# Patient Record
Sex: FEMALE | Race: BLACK OR AFRICAN AMERICAN | NOT HISPANIC OR LATINO
[De-identification: names, ages, dates, MRNs, and addresses within clinical notes are randomized per-mention and may not be internally consistent; named-entity substitution may affect disease eponyms.]

---

## 2017-04-19 PROBLEM — Z00.00 ENCOUNTER FOR PREVENTIVE HEALTH EXAMINATION: Status: ACTIVE | Noted: 2017-04-19

## 2017-05-23 ENCOUNTER — APPOINTMENT (OUTPATIENT)
Dept: ENDOCRINOLOGY | Facility: CLINIC | Age: 77
End: 2017-05-23

## 2017-05-23 ENCOUNTER — RESULT CHARGE (OUTPATIENT)
Age: 77
End: 2017-05-23

## 2017-05-23 ENCOUNTER — LABORATORY RESULT (OUTPATIENT)
Age: 77
End: 2017-05-23

## 2017-05-23 VITALS
HEIGHT: 66 IN | WEIGHT: 190 LBS | RESPIRATION RATE: 16 BRPM | BODY MASS INDEX: 30.53 KG/M2 | HEART RATE: 89 BPM | SYSTOLIC BLOOD PRESSURE: 130 MMHG | DIASTOLIC BLOOD PRESSURE: 80 MMHG | OXYGEN SATURATION: 98 %

## 2017-05-23 LAB — GLUCOSE BLDC GLUCOMTR-MCNC: 102

## 2017-05-25 LAB
ALBUMIN SERPL ELPH-MCNC: 4.2 G/DL
ALP BLD-CCNC: 94 U/L
ALT SERPL-CCNC: 16 U/L
ANION GAP SERPL CALC-SCNC: 16 MMOL/L
AST SERPL-CCNC: 17 U/L
BASOPHILS # BLD AUTO: 0.08 K/UL
BASOPHILS NFR BLD AUTO: 1 %
BILIRUB SERPL-MCNC: 0.5 MG/DL
BUN SERPL-MCNC: 23 MG/DL
CALCIUM SERPL-MCNC: 10.1 MG/DL
CHLORIDE SERPL-SCNC: 97 MMOL/L
CHOLEST SERPL-MCNC: 162 MG/DL
CHOLEST/HDLC SERPL: 3.7 RATIO
CO2 SERPL-SCNC: 23 MMOL/L
CREAT SERPL-MCNC: 0.99 MG/DL
EOSINOPHIL # BLD AUTO: 0.16 K/UL
EOSINOPHIL NFR BLD AUTO: 2 %
GLUCOSE SERPL-MCNC: 92 MG/DL
HBA1C MFR BLD HPLC: 8.3 %
HCT VFR BLD CALC: 44.1 %
HDLC SERPL-MCNC: 44 MG/DL
HGB BLD-MCNC: 13.8 G/DL
LDLC SERPL CALC-MCNC: 97 MG/DL
LYMPHOCYTES # BLD AUTO: 3.18 K/UL
LYMPHOCYTES NFR BLD AUTO: 40.5 %
MAN DIFF?: NORMAL
MCHC RBC-ENTMCNC: 23.2 PG
MCHC RBC-ENTMCNC: 31.3 GM/DL
MCV RBC AUTO: 74 FL
MONOCYTES # BLD AUTO: 0.94 K/UL
MONOCYTES NFR BLD AUTO: 11.9 %
NEUTROPHILS # BLD AUTO: 3.19 K/UL
NEUTROPHILS NFR BLD AUTO: 40.6 %
PLATELET # BLD AUTO: 187 K/UL
POTASSIUM SERPL-SCNC: 4.2 MMOL/L
PROT SERPL-MCNC: 7.4 G/DL
RBC # BLD: 5.96 M/UL
RBC # FLD: 16.2 %
SODIUM SERPL-SCNC: 136 MMOL/L
TRIGL SERPL-MCNC: 105 MG/DL
TSH SERPL-ACNC: 1.38 UIU/ML
WBC # FLD AUTO: 7.86 K/UL

## 2017-06-21 ENCOUNTER — OUTPATIENT (OUTPATIENT)
Dept: OUTPATIENT SERVICES | Facility: HOSPITAL | Age: 77
LOS: 1 days | Discharge: HOME | End: 2017-06-21

## 2017-06-21 VITALS
HEART RATE: 69 BPM | OXYGEN SATURATION: 98 % | TEMPERATURE: 98 F | DIASTOLIC BLOOD PRESSURE: 57 MMHG | HEIGHT: 66 IN | SYSTOLIC BLOOD PRESSURE: 140 MMHG | WEIGHT: 183.42 LBS | RESPIRATION RATE: 17 BRPM

## 2017-06-21 DIAGNOSIS — Z01.818 ENCOUNTER FOR OTHER PREPROCEDURAL EXAMINATION: ICD-10-CM

## 2017-06-21 DIAGNOSIS — M16.11 UNILATERAL PRIMARY OSTEOARTHRITIS, RIGHT HIP: ICD-10-CM

## 2017-06-21 DIAGNOSIS — N60.09 SOLITARY CYST OF UNSPECIFIED BREAST: Chronic | ICD-10-CM

## 2017-06-21 DIAGNOSIS — M25.551 PAIN IN RIGHT HIP: ICD-10-CM

## 2017-06-21 DIAGNOSIS — Z90.710 ACQUIRED ABSENCE OF BOTH CERVIX AND UTERUS: Chronic | ICD-10-CM

## 2017-06-21 DIAGNOSIS — I10 ESSENTIAL (PRIMARY) HYPERTENSION: ICD-10-CM

## 2017-06-21 DIAGNOSIS — E11.8 TYPE 2 DIABETES MELLITUS WITH UNSPECIFIED COMPLICATIONS: ICD-10-CM

## 2017-06-21 LAB
ALBUMIN SERPL ELPH-MCNC: 4.2 G/DL — SIGNIFICANT CHANGE UP (ref 3.3–5)
ALP SERPL-CCNC: 86 U/L — SIGNIFICANT CHANGE UP (ref 40–120)
ALT FLD-CCNC: 20 U/L — SIGNIFICANT CHANGE UP (ref 12–78)
ANION GAP SERPL CALC-SCNC: 10 MMOL/L — SIGNIFICANT CHANGE UP (ref 5–17)
APTT BLD: 30.5 SEC — SIGNIFICANT CHANGE UP (ref 27.5–37.4)
AST SERPL-CCNC: 20 U/L — SIGNIFICANT CHANGE UP (ref 15–37)
BASOPHILS # BLD AUTO: 0.1 K/UL — SIGNIFICANT CHANGE UP (ref 0–0.2)
BASOPHILS NFR BLD AUTO: 0.9 % — SIGNIFICANT CHANGE UP (ref 0–2)
BILIRUB SERPL-MCNC: 0.7 MG/DL — SIGNIFICANT CHANGE UP (ref 0.2–1.2)
BLD GP AB SCN SERPL QL: SIGNIFICANT CHANGE UP
BUN SERPL-MCNC: 25 MG/DL — HIGH (ref 7–23)
CALCIUM SERPL-MCNC: 10.2 MG/DL — HIGH (ref 8.5–10.1)
CHLORIDE SERPL-SCNC: 103 MMOL/L — SIGNIFICANT CHANGE UP (ref 96–108)
CO2 SERPL-SCNC: 28 MMOL/L — SIGNIFICANT CHANGE UP (ref 22–31)
CREAT SERPL-MCNC: 1.24 MG/DL — SIGNIFICANT CHANGE UP (ref 0.5–1.3)
EOSINOPHIL # BLD AUTO: 0.3 K/UL — SIGNIFICANT CHANGE UP (ref 0–0.5)
EOSINOPHIL NFR BLD AUTO: 3.1 % — SIGNIFICANT CHANGE UP (ref 0–6)
GLUCOSE SERPL-MCNC: 71 MG/DL — SIGNIFICANT CHANGE UP (ref 70–99)
HBA1C BLD-MCNC: 7.8 % — HIGH (ref 4–5.6)
HCT VFR BLD CALC: 46.6 % — HIGH (ref 34.5–45)
HGB BLD-MCNC: 15 G/DL — SIGNIFICANT CHANGE UP (ref 11.5–15.5)
INR BLD: 1.04 RATIO — SIGNIFICANT CHANGE UP (ref 0.88–1.16)
LYMPHOCYTES # BLD AUTO: 3.1 K/UL — SIGNIFICANT CHANGE UP (ref 1–3.3)
LYMPHOCYTES # BLD AUTO: 36.1 % — SIGNIFICANT CHANGE UP (ref 13–44)
MCHC RBC-ENTMCNC: 23.2 PG — LOW (ref 27–34)
MCHC RBC-ENTMCNC: 32.1 GM/DL — SIGNIFICANT CHANGE UP (ref 32–36)
MCV RBC AUTO: 72.4 FL — LOW (ref 80–100)
MONOCYTES # BLD AUTO: 0.7 K/UL — SIGNIFICANT CHANGE UP (ref 0–0.9)
MONOCYTES NFR BLD AUTO: 8.3 % — SIGNIFICANT CHANGE UP (ref 2–14)
MRSA PCR RESULT.: SIGNIFICANT CHANGE UP
NEUTROPHILS # BLD AUTO: 4.5 K/UL — SIGNIFICANT CHANGE UP (ref 1.8–7.4)
NEUTROPHILS NFR BLD AUTO: 51.7 % — SIGNIFICANT CHANGE UP (ref 43–77)
PLATELET # BLD AUTO: 252 K/UL — SIGNIFICANT CHANGE UP (ref 150–400)
POTASSIUM SERPL-MCNC: 3.5 MMOL/L — SIGNIFICANT CHANGE UP (ref 3.5–5.3)
POTASSIUM SERPL-SCNC: 3.5 MMOL/L — SIGNIFICANT CHANGE UP (ref 3.5–5.3)
PROT SERPL-MCNC: 8.4 GM/DL — HIGH (ref 6–8.3)
PROTHROM AB SERPL-ACNC: 11.3 SEC — SIGNIFICANT CHANGE UP (ref 9.8–12.7)
RBC # BLD: 6.44 M/UL — HIGH (ref 3.8–5.2)
RBC # FLD: 14.2 % — SIGNIFICANT CHANGE UP (ref 11–15)
S AUREUS DNA NOSE QL NAA+PROBE: SIGNIFICANT CHANGE UP
SODIUM SERPL-SCNC: 141 MMOL/L — SIGNIFICANT CHANGE UP (ref 135–145)
WBC # BLD: 8.7 K/UL — SIGNIFICANT CHANGE UP (ref 3.8–10.5)
WBC # FLD AUTO: 8.7 K/UL — SIGNIFICANT CHANGE UP (ref 3.8–10.5)

## 2017-06-21 NOTE — H&P PST ADULT - PMH
Essential hypertension    Obesity, unspecified obesity severity, unspecified obesity type    Type 2 diabetes mellitus with complication, without long-term current use of insulin

## 2017-06-21 NOTE — H&P PST ADULT - HISTORY OF PRESENT ILLNESS
76F presents to PST with right hip pain. Patient is here today for Pre-surgical clearance for elective right hip replacement. PMH includes NIDDM, HTN, obesity. Ambulates with cane.

## 2017-06-21 NOTE — H&P PST ADULT - PROBLEM SELECTOR PLAN 1
replacement scheduled for 7/6/17  cardiac clearance noted in chart, Dr Ashwini Pennington  medical clearance preop.  EKG/labs

## 2017-06-30 ENCOUNTER — APPOINTMENT (OUTPATIENT)
Dept: ENDOCRINOLOGY | Facility: CLINIC | Age: 77
End: 2017-06-30

## 2017-07-06 ENCOUNTER — INPATIENT (INPATIENT)
Facility: HOSPITAL | Age: 77
LOS: 1 days | Discharge: NURSING, REHAB OR LONG-TERM CARE | End: 2017-07-08
Attending: ORTHOPAEDIC SURGERY | Admitting: ORTHOPAEDIC SURGERY
Payer: MEDICARE

## 2017-07-06 ENCOUNTER — RESULT REVIEW (OUTPATIENT)
Age: 77
End: 2017-07-06

## 2017-07-06 VITALS
DIASTOLIC BLOOD PRESSURE: 77 MMHG | HEART RATE: 85 BPM | OXYGEN SATURATION: 100 % | TEMPERATURE: 97 F | RESPIRATION RATE: 16 BRPM | WEIGHT: 182.1 LBS | SYSTOLIC BLOOD PRESSURE: 150 MMHG | HEIGHT: 66 IN

## 2017-07-06 DIAGNOSIS — Z90.710 ACQUIRED ABSENCE OF BOTH CERVIX AND UTERUS: Chronic | ICD-10-CM

## 2017-07-06 DIAGNOSIS — N60.09 SOLITARY CYST OF UNSPECIFIED BREAST: Chronic | ICD-10-CM

## 2017-07-06 LAB
ANION GAP SERPL CALC-SCNC: 7 MMOL/L — SIGNIFICANT CHANGE UP (ref 5–17)
BLD GP AB SCN SERPL QL: SIGNIFICANT CHANGE UP
BUN SERPL-MCNC: 12 MG/DL — SIGNIFICANT CHANGE UP (ref 7–23)
CALCIUM SERPL-MCNC: 9 MG/DL — SIGNIFICANT CHANGE UP (ref 8.5–10.1)
CHLORIDE SERPL-SCNC: 105 MMOL/L — SIGNIFICANT CHANGE UP (ref 96–108)
CO2 SERPL-SCNC: 26 MMOL/L — SIGNIFICANT CHANGE UP (ref 22–31)
CREAT SERPL-MCNC: 0.93 MG/DL — SIGNIFICANT CHANGE UP (ref 0.5–1.3)
GLUCOSE SERPL-MCNC: 120 MG/DL — HIGH (ref 70–99)
HCT VFR BLD CALC: 41.3 % — SIGNIFICANT CHANGE UP (ref 34.5–45)
HGB BLD-MCNC: 13.4 G/DL — SIGNIFICANT CHANGE UP (ref 11.5–15.5)
MCHC RBC-ENTMCNC: 23.7 PG — LOW (ref 27–34)
MCHC RBC-ENTMCNC: 32.6 GM/DL — SIGNIFICANT CHANGE UP (ref 32–36)
MCV RBC AUTO: 72.8 FL — LOW (ref 80–100)
PLATELET # BLD AUTO: 234 K/UL — SIGNIFICANT CHANGE UP (ref 150–400)
POTASSIUM SERPL-MCNC: 4.2 MMOL/L — SIGNIFICANT CHANGE UP (ref 3.5–5.3)
POTASSIUM SERPL-SCNC: 4.2 MMOL/L — SIGNIFICANT CHANGE UP (ref 3.5–5.3)
RBC # BLD: 5.67 M/UL — HIGH (ref 3.8–5.2)
RBC # FLD: 14.7 % — SIGNIFICANT CHANGE UP (ref 11–15)
SODIUM SERPL-SCNC: 138 MMOL/L — SIGNIFICANT CHANGE UP (ref 135–145)
WBC # BLD: 9.2 K/UL — SIGNIFICANT CHANGE UP (ref 3.8–10.5)
WBC # FLD AUTO: 9.2 K/UL — SIGNIFICANT CHANGE UP (ref 3.8–10.5)

## 2017-07-06 PROCEDURE — 88305 TISSUE EXAM BY PATHOLOGIST: CPT | Mod: 26

## 2017-07-06 PROCEDURE — 88311 DECALCIFY TISSUE: CPT | Mod: 26

## 2017-07-06 PROCEDURE — 72170 X-RAY EXAM OF PELVIS: CPT | Mod: 26

## 2017-07-06 RX ORDER — CEFAZOLIN SODIUM 1 G
2000 VIAL (EA) INJECTION EVERY 8 HOURS
Qty: 0 | Refills: 0 | Status: COMPLETED | OUTPATIENT
Start: 2017-07-06 | End: 2017-07-07

## 2017-07-06 RX ORDER — PANTOPRAZOLE SODIUM 20 MG/1
40 TABLET, DELAYED RELEASE ORAL DAILY
Qty: 0 | Refills: 0 | Status: DISCONTINUED | OUTPATIENT
Start: 2017-07-06 | End: 2017-07-08

## 2017-07-06 RX ORDER — SENNA PLUS 8.6 MG/1
2 TABLET ORAL AT BEDTIME
Qty: 0 | Refills: 0 | Status: DISCONTINUED | OUTPATIENT
Start: 2017-07-06 | End: 2017-07-08

## 2017-07-06 RX ORDER — DEXTROSE 50 % IN WATER 50 %
12.5 SYRINGE (ML) INTRAVENOUS ONCE
Qty: 0 | Refills: 0 | Status: DISCONTINUED | OUTPATIENT
Start: 2017-07-06 | End: 2017-07-08

## 2017-07-06 RX ORDER — SODIUM CHLORIDE 9 MG/ML
3 INJECTION INTRAMUSCULAR; INTRAVENOUS; SUBCUTANEOUS EVERY 8 HOURS
Qty: 0 | Refills: 0 | Status: DISCONTINUED | OUTPATIENT
Start: 2017-07-06 | End: 2017-07-08

## 2017-07-06 RX ORDER — ATORVASTATIN CALCIUM 80 MG/1
10 TABLET, FILM COATED ORAL AT BEDTIME
Qty: 0 | Refills: 0 | Status: DISCONTINUED | OUTPATIENT
Start: 2017-07-06 | End: 2017-07-08

## 2017-07-06 RX ORDER — FOLIC ACID 0.8 MG
1 TABLET ORAL DAILY
Qty: 0 | Refills: 0 | Status: DISCONTINUED | OUTPATIENT
Start: 2017-07-06 | End: 2017-07-08

## 2017-07-06 RX ORDER — FAMOTIDINE 10 MG/ML
1 INJECTION INTRAVENOUS
Qty: 30 | Refills: 0
Start: 2017-07-06 | End: 2017-08-05

## 2017-07-06 RX ORDER — METOPROLOL TARTRATE 50 MG
25 TABLET ORAL DAILY
Qty: 0 | Refills: 0 | Status: DISCONTINUED | OUTPATIENT
Start: 2017-07-06 | End: 2017-07-06

## 2017-07-06 RX ORDER — MORPHINE SULFATE 50 MG/1
4 CAPSULE, EXTENDED RELEASE ORAL EVERY 6 HOURS
Qty: 0 | Refills: 0 | Status: DISCONTINUED | OUTPATIENT
Start: 2017-07-06 | End: 2017-07-08

## 2017-07-06 RX ORDER — HYDROMORPHONE HYDROCHLORIDE 2 MG/ML
0.5 INJECTION INTRAMUSCULAR; INTRAVENOUS; SUBCUTANEOUS
Qty: 0 | Refills: 0 | Status: DISCONTINUED | OUTPATIENT
Start: 2017-07-06 | End: 2017-07-08

## 2017-07-06 RX ORDER — DEXTROSE 50 % IN WATER 50 %
25 SYRINGE (ML) INTRAVENOUS ONCE
Qty: 0 | Refills: 0 | Status: DISCONTINUED | OUTPATIENT
Start: 2017-07-06 | End: 2017-07-08

## 2017-07-06 RX ORDER — OXYCODONE HYDROCHLORIDE 5 MG/1
5 TABLET ORAL EVERY 4 HOURS
Qty: 0 | Refills: 0 | Status: DISCONTINUED | OUTPATIENT
Start: 2017-07-06 | End: 2017-07-08

## 2017-07-06 RX ORDER — FERROUS SULFATE 325(65) MG
325 TABLET ORAL
Qty: 0 | Refills: 0 | Status: DISCONTINUED | OUTPATIENT
Start: 2017-07-06 | End: 2017-07-08

## 2017-07-06 RX ORDER — ONDANSETRON 8 MG/1
4 TABLET, FILM COATED ORAL EVERY 6 HOURS
Qty: 0 | Refills: 0 | Status: DISCONTINUED | OUTPATIENT
Start: 2017-07-06 | End: 2017-07-08

## 2017-07-06 RX ORDER — OXYCODONE HYDROCHLORIDE 5 MG/1
10 TABLET ORAL EVERY 4 HOURS
Qty: 0 | Refills: 0 | Status: DISCONTINUED | OUTPATIENT
Start: 2017-07-06 | End: 2017-07-08

## 2017-07-06 RX ORDER — LOSARTAN POTASSIUM 100 MG/1
25 TABLET, FILM COATED ORAL DAILY
Qty: 0 | Refills: 0 | Status: DISCONTINUED | OUTPATIENT
Start: 2017-07-06 | End: 2017-07-08

## 2017-07-06 RX ORDER — BENZOCAINE AND MENTHOL 5; 1 G/100ML; G/100ML
1 LIQUID ORAL EVERY 4 HOURS
Qty: 0 | Refills: 0 | Status: DISCONTINUED | OUTPATIENT
Start: 2017-07-06 | End: 2017-07-08

## 2017-07-06 RX ORDER — DOCUSATE SODIUM 100 MG
100 CAPSULE ORAL THREE TIMES A DAY
Qty: 0 | Refills: 0 | Status: DISCONTINUED | OUTPATIENT
Start: 2017-07-06 | End: 2017-07-08

## 2017-07-06 RX ORDER — DIPHENHYDRAMINE HCL 50 MG
25 CAPSULE ORAL EVERY 4 HOURS
Qty: 0 | Refills: 0 | Status: DISCONTINUED | OUTPATIENT
Start: 2017-07-06 | End: 2017-07-08

## 2017-07-06 RX ORDER — ACETAMINOPHEN 500 MG
650 TABLET ORAL EVERY 6 HOURS
Qty: 0 | Refills: 0 | Status: DISCONTINUED | OUTPATIENT
Start: 2017-07-06 | End: 2017-07-08

## 2017-07-06 RX ORDER — ASCORBIC ACID 60 MG
500 TABLET,CHEWABLE ORAL
Qty: 0 | Refills: 0 | Status: DISCONTINUED | OUTPATIENT
Start: 2017-07-06 | End: 2017-07-08

## 2017-07-06 RX ORDER — INSULIN LISPRO 100/ML
VIAL (ML) SUBCUTANEOUS
Qty: 0 | Refills: 0 | Status: DISCONTINUED | OUTPATIENT
Start: 2017-07-06 | End: 2017-07-08

## 2017-07-06 RX ORDER — SODIUM CHLORIDE 9 MG/ML
1000 INJECTION, SOLUTION INTRAVENOUS
Qty: 0 | Refills: 0 | Status: DISCONTINUED | OUTPATIENT
Start: 2017-07-06 | End: 2017-07-08

## 2017-07-06 RX ORDER — SODIUM CHLORIDE 9 MG/ML
1000 INJECTION, SOLUTION INTRAVENOUS
Qty: 0 | Refills: 0 | Status: DISCONTINUED | OUTPATIENT
Start: 2017-07-06 | End: 2017-07-07

## 2017-07-06 RX ORDER — DEXTROSE 50 % IN WATER 50 %
1 SYRINGE (ML) INTRAVENOUS ONCE
Qty: 0 | Refills: 0 | Status: DISCONTINUED | OUTPATIENT
Start: 2017-07-06 | End: 2017-07-08

## 2017-07-06 RX ORDER — DICLOFENAC SODIUM 75 MG/1
1 TABLET, DELAYED RELEASE ORAL
Qty: 0 | Refills: 0 | COMMUNITY

## 2017-07-06 RX ORDER — MELOXICAM 15 MG/1
1 TABLET ORAL
Qty: 0 | Refills: 0 | COMMUNITY

## 2017-07-06 RX ORDER — SODIUM CHLORIDE 9 MG/ML
3 INJECTION INTRAMUSCULAR; INTRAVENOUS; SUBCUTANEOUS EVERY 8 HOURS
Qty: 0 | Refills: 0 | Status: DISCONTINUED | OUTPATIENT
Start: 2017-07-06 | End: 2017-07-06

## 2017-07-06 RX ORDER — POLYETHYLENE GLYCOL 3350 17 G/17G
17 POWDER, FOR SOLUTION ORAL DAILY
Qty: 0 | Refills: 0 | Status: DISCONTINUED | OUTPATIENT
Start: 2017-07-06 | End: 2017-07-08

## 2017-07-06 RX ORDER — DIPHENHYDRAMINE HCL 50 MG
25 CAPSULE ORAL AT BEDTIME
Qty: 0 | Refills: 0 | Status: DISCONTINUED | OUTPATIENT
Start: 2017-07-06 | End: 2017-07-08

## 2017-07-06 RX ORDER — ASPIRIN/CALCIUM CARB/MAGNESIUM 324 MG
1 TABLET ORAL
Qty: 60 | Refills: 0
Start: 2017-07-06 | End: 2017-08-05

## 2017-07-06 RX ORDER — METOPROLOL TARTRATE 50 MG
25 TABLET ORAL DAILY
Qty: 0 | Refills: 0 | Status: DISCONTINUED | OUTPATIENT
Start: 2017-07-06 | End: 2017-07-08

## 2017-07-06 RX ORDER — GLUCAGON INJECTION, SOLUTION 0.5 MG/.1ML
1 INJECTION, SOLUTION SUBCUTANEOUS ONCE
Qty: 0 | Refills: 0 | Status: DISCONTINUED | OUTPATIENT
Start: 2017-07-06 | End: 2017-07-08

## 2017-07-06 RX ORDER — AMLODIPINE BESYLATE 2.5 MG/1
10 TABLET ORAL DAILY
Qty: 0 | Refills: 0 | Status: DISCONTINUED | OUTPATIENT
Start: 2017-07-06 | End: 2017-07-08

## 2017-07-06 RX ORDER — ASPIRIN/CALCIUM CARB/MAGNESIUM 324 MG
325 TABLET ORAL
Qty: 0 | Refills: 0 | Status: DISCONTINUED | OUTPATIENT
Start: 2017-07-07 | End: 2017-07-08

## 2017-07-06 RX ORDER — INSULIN LISPRO 100/ML
VIAL (ML) SUBCUTANEOUS AT BEDTIME
Qty: 0 | Refills: 0 | Status: DISCONTINUED | OUTPATIENT
Start: 2017-07-06 | End: 2017-07-08

## 2017-07-06 RX ADMIN — Medication 100 MILLIGRAM(S): at 20:54

## 2017-07-06 RX ADMIN — ATORVASTATIN CALCIUM 10 MILLIGRAM(S): 80 TABLET, FILM COATED ORAL at 22:43

## 2017-07-06 RX ADMIN — MORPHINE SULFATE 4 MILLIGRAM(S): 50 CAPSULE, EXTENDED RELEASE ORAL at 22:50

## 2017-07-06 RX ADMIN — Medication 100 MILLIGRAM(S): at 22:43

## 2017-07-06 RX ADMIN — SODIUM CHLORIDE 3 MILLILITER(S): 9 INJECTION INTRAMUSCULAR; INTRAVENOUS; SUBCUTANEOUS at 22:36

## 2017-07-06 RX ADMIN — MORPHINE SULFATE 4 MILLIGRAM(S): 50 CAPSULE, EXTENDED RELEASE ORAL at 23:05

## 2017-07-06 NOTE — DISCHARGE NOTE ADULT - MEDICATION SUMMARY - MEDICATIONS TO TAKE
I will START or STAY ON the medications listed below when I get home from the hospital:    Ecotrin 325 mg oral delayed release tablet  -- 1 tab(s) by mouth 2 times a day for DVT ppx  -- Swallow whole.  Do not crush.  Take with food or milk.    -- Indication: For DVT ppx    losartan 25 mg oral tablet  -- 1 tab(s) by mouth once a day  -- Indication: For prior med    Invokana 300 mg oral tablet  -- 1 tab(s) by mouth once a day  -- Indication: For prior med    glimepiride 4 mg oral tablet  -- 1 tab(s) by mouth once a day  -- Indication: For prior med    amlodipine-atorvastatin 10 mg-10 mg oral tablet  -- 1 tab(s) by mouth once a day  -- Indication: For prior med    metoprolol  -- 25 milligram(s) by mouth once a day  -- Indication: For prior med    Pepcid 20 mg oral tablet  -- 1 tab(s) by mouth once a day for GI ppx  -- It is very important that you take or use this exactly as directed.  Do not skip doses or discontinue unless directed by your doctor.  Obtain medical advice before taking any non-prescription drugs as some may affect the action of this medication.    -- Indication: For GI ppx I will START or STAY ON the medications listed below when I get home from the hospital:    oxyCODONE 5 mg oral tablet  -- 1 tab(s) by mouth every 4 hours, As needed, Mild Pain  -- Indication: For prn mild/moderate pain    oxyCODONE 10 mg oral tablet  -- 1 tab(s) by mouth every 4 hours, As needed, Moderate Pain  -- Indication: For prn severe pain    Ecotrin 325 mg oral delayed release tablet  -- 1 tab(s) by mouth 2 times a day for DVT ppx  -- Swallow whole.  Do not crush.  Take with food or milk.    -- Indication: For DVT ppx    losartan 25 mg oral tablet  -- 1 tab(s) by mouth once a day  -- Indication: For prior med    Invokana 300 mg oral tablet  -- 1 tab(s) by mouth once a day  -- Indication: For prior med    glimepiride 4 mg oral tablet  -- 1 tab(s) by mouth once a day  -- Indication: For prior med    Zofran 2 mg/mL injectable solution  --  injectable   -- Indication: For prn nausea, can give oral in subsitution    amlodipine-atorvastatin 10 mg-10 mg oral tablet  -- 1 tab(s) by mouth once a day  -- Indication: For prior med    metoprolol  -- 25 milligram(s) by mouth once a day  -- Indication: For prior med    Pepcid 20 mg oral tablet  -- 1 tab(s) by mouth once a day for GI ppx  -- It is very important that you take or use this exactly as directed.  Do not skip doses or discontinue unless directed by your doctor.  Obtain medical advice before taking any non-prescription drugs as some may affect the action of this medication.    -- Indication: For GI ppx    docusate sodium 100 mg oral capsule  -- 1 cap(s) by mouth 3 times a day  -- Indication: For prn constipation while taking pain medication    senna oral tablet  -- 2 tab(s) by mouth once a day (at bedtime), As needed, Constipation  -- Indication: For prn constipation while taking pain medication

## 2017-07-06 NOTE — DISCHARGE NOTE ADULT - HOSPITAL COURSE
The patient is a 76 year old female status post elective Total Hip Arthroplasty to the right hip after failing outpatient non-operative conservative management.  Patient presented to [facility] after being medically cleared for an elective surgical procedure. The patient was taken to the operating room on date mentioned above. Prophylactic antibiotics were started before the procedure and continued for 24 hours.  There were no complications during the procedure and patient tolerated the procedure well.  The patient was transferred to recovery room in stable condition and subsequently to surgical floor.  Patient was placed ASA for anticoagulation.  All home medications were continued.  The patient received physical therapy daily and daily labs were followed.  The dressing was kept clean, dry, intact.  The rest of the hospital stay was unremarkable.

## 2017-07-06 NOTE — PHYSICAL THERAPY INITIAL EVALUATION ADULT - LIVES WITH, PROFILE
Pt. lives on the 3rd floor apartment [facility], has 27steps to go to her floor, has railing on one side.

## 2017-07-06 NOTE — PHYSICAL THERAPY INITIAL EVALUATION ADULT - IMPAIRMENTS FOUND, PT EVAL
gait, locomotion, and balance/aerobic capacity/endurance/ROM/ergonomics and body mechanics/muscle strength

## 2017-07-06 NOTE — DISCHARGE NOTE ADULT - CARE PLAN
Principal Discharge DX:	OA (osteoarthritis)  Goal:	Return to ADL's  Instructions for follow-up, activity and diet:	1.	Pain Control  2.	Walking with full weight bearing as tolerated, with assistive devices (walker/Cane as Needed)  3.	DVT Prophylaxis with Aspirin 325mg twice daily for 4 weeks  4.	PT as needed  5.	Follow up with Dr. Sheppard as Outpatient in 10-14 Days after Discharge from the Hospital or Rehab. Call Office For Appointment  6.	Remove Staples Post-Op Day 14, and Remove Dressing Post-Op Day 10, with Daily Dressing Changes as Need.  7.	Ice/Elevate affected area as Needed  8.	Keep Dressing  Clean and dry.

## 2017-07-06 NOTE — DISCHARGE NOTE ADULT - PATIENT PORTAL LINK FT
“You can access the Follow[facility] Patient Portal, offered by [facility] [place], by registering with the following website: [email or web address]”

## 2017-07-06 NOTE — PROGRESS NOTE ADULT - SUBJECTIVE AND OBJECTIVE BOX
Post op check    Pt S&E. Pain controlled. Resting comfortably.     Vital Signs Last 24 Hrs  T(C): 36.2 (07-06-17 @ 08:28), Max: 36.2 (07-06-17 @ 08:28)  T(F): 97.1 (07-06-17 @ 08:28), Max: 97.1 (07-06-17 @ 08:28)  HR: 85 (07-06-17 @ 08:28) (85 - 85)  BP: 150/77 (07-06-17 @ 08:28) (150/77 - 150/77)  BP(mean): --  RR: 16 (07-06-17 @ 08:28) (16 - 16)  SpO2: 100% (07-06-17 @ 08:28) (100% - 100%)    Gen: NAD  RLE:  Dsg c/d/i  SILT L2-S1  +EHL/FHL/TA/Gastroc  DP+  Soft compartments, - calf ttp

## 2017-07-06 NOTE — PHYSICAL THERAPY INITIAL EVALUATION ADULT - COORDINATION ASSESSED, REHAB EVAL
not tested due to pain and tightness in the right hip/heel to shin heel to shin/not tested due to pain and tightness in the right hip

## 2017-07-06 NOTE — DISCHARGE NOTE ADULT - NS AS ACTIVITY OBS
Walking-Indoors allowed/Showering allowed/Stairs allowed/Walking-Outdoors allowed/No Heavy lifting/straining/Do not drive or operate machinery

## 2017-07-06 NOTE — DISCHARGE NOTE ADULT - PLAN OF CARE
Return to ADL's 1.	Pain Control  2.	Walking with full weight bearing as tolerated, with assistive devices (walker/Cane as Needed)  3.	DVT Prophylaxis with Aspirin 325mg twice daily for 4 weeks  4.	PT as needed  5.	Follow up with Dr. Sheppard as Outpatient in 10-14 Days after Discharge from the Hospital or Rehab. Call Office For Appointment  6.	Remove Staples Post-Op Day 14, and Remove Dressing Post-Op Day 10, with Daily Dressing Changes as Need.  7.	Ice/Elevate affected area as Needed  8.	Keep Dressing  Clean and dry.

## 2017-07-06 NOTE — PHYSICAL THERAPY INITIAL EVALUATION ADULT - DISCHARGE DISPOSITION, PT EVAL
home w/ home PT/May be changed pending ability to perform 27 steps in home setting safely and efficiently

## 2017-07-06 NOTE — PHYSICAL THERAPY INITIAL EVALUATION ADULT - CRITERIA FOR SKILLED THERAPEUTIC INTERVENTIONS
functional limitations in following categories/anticipated equipment needs at discharge/impairments found/anticipated discharge recommendation

## 2017-07-06 NOTE — DISCHARGE NOTE ADULT - CARE PROVIDER_API CALL
David Sheppard), Orthopaedic Surgery  [address] [place] [address]  Phone: [phone]  Fax: [phone]

## 2017-07-06 NOTE — PHYSICAL THERAPY INITIAL EVALUATION ADULT - GENERAL OBSERVATIONS, REHAB EVAL
Found supine, alert, oriented x 4, follow directions, dressing dry and intact, 02 cannula at 2 LPM, abduction pillow , pteds. c/o pain and tightness in the right hip.

## 2017-07-07 ENCOUNTER — TRANSCRIPTION ENCOUNTER (OUTPATIENT)
Age: 77
End: 2017-07-07

## 2017-07-07 LAB
ANION GAP SERPL CALC-SCNC: 8 MMOL/L — SIGNIFICANT CHANGE UP (ref 5–17)
BUN SERPL-MCNC: 17 MG/DL — SIGNIFICANT CHANGE UP (ref 7–23)
CALCIUM SERPL-MCNC: 8.5 MG/DL — SIGNIFICANT CHANGE UP (ref 8.5–10.1)
CHLORIDE SERPL-SCNC: 105 MMOL/L — SIGNIFICANT CHANGE UP (ref 96–108)
CO2 SERPL-SCNC: 26 MMOL/L — SIGNIFICANT CHANGE UP (ref 22–31)
CREAT SERPL-MCNC: 0.99 MG/DL — SIGNIFICANT CHANGE UP (ref 0.5–1.3)
GLUCOSE SERPL-MCNC: 156 MG/DL — HIGH (ref 70–99)
HCT VFR BLD CALC: 33.6 % — LOW (ref 34.5–45)
HGB BLD-MCNC: 10.9 G/DL — LOW (ref 11.5–15.5)
MCHC RBC-ENTMCNC: 23.3 PG — LOW (ref 27–34)
MCHC RBC-ENTMCNC: 32.5 GM/DL — SIGNIFICANT CHANGE UP (ref 32–36)
MCV RBC AUTO: 71.6 FL — LOW (ref 80–100)
PLATELET # BLD AUTO: 201 K/UL — SIGNIFICANT CHANGE UP (ref 150–400)
POTASSIUM SERPL-MCNC: 4.1 MMOL/L — SIGNIFICANT CHANGE UP (ref 3.5–5.3)
POTASSIUM SERPL-SCNC: 4.1 MMOL/L — SIGNIFICANT CHANGE UP (ref 3.5–5.3)
RBC # BLD: 4.68 M/UL — SIGNIFICANT CHANGE UP (ref 3.8–5.2)
RBC # FLD: 14.9 % — SIGNIFICANT CHANGE UP (ref 11–15)
SODIUM SERPL-SCNC: 139 MMOL/L — SIGNIFICANT CHANGE UP (ref 135–145)
WBC # BLD: 6.6 K/UL — SIGNIFICANT CHANGE UP (ref 3.8–10.5)
WBC # FLD AUTO: 6.6 K/UL — SIGNIFICANT CHANGE UP (ref 3.8–10.5)

## 2017-07-07 RX ADMIN — AMLODIPINE BESYLATE 10 MILLIGRAM(S): 2.5 TABLET ORAL at 06:31

## 2017-07-07 RX ADMIN — Medication 325 MILLIGRAM(S): at 17:29

## 2017-07-07 RX ADMIN — OXYCODONE HYDROCHLORIDE 10 MILLIGRAM(S): 5 TABLET ORAL at 10:00

## 2017-07-07 RX ADMIN — Medication 500 MILLIGRAM(S): at 17:29

## 2017-07-07 RX ADMIN — LOSARTAN POTASSIUM 25 MILLIGRAM(S): 100 TABLET, FILM COATED ORAL at 06:32

## 2017-07-07 RX ADMIN — OXYCODONE HYDROCHLORIDE 10 MILLIGRAM(S): 5 TABLET ORAL at 15:06

## 2017-07-07 RX ADMIN — Medication 325 MILLIGRAM(S): at 12:15

## 2017-07-07 RX ADMIN — OXYCODONE HYDROCHLORIDE 10 MILLIGRAM(S): 5 TABLET ORAL at 09:01

## 2017-07-07 RX ADMIN — Medication 100 MILLIGRAM(S): at 21:42

## 2017-07-07 RX ADMIN — Medication 325 MILLIGRAM(S): at 06:31

## 2017-07-07 RX ADMIN — Medication 1 TABLET(S): at 12:15

## 2017-07-07 RX ADMIN — OXYCODONE HYDROCHLORIDE 10 MILLIGRAM(S): 5 TABLET ORAL at 04:15

## 2017-07-07 RX ADMIN — OXYCODONE HYDROCHLORIDE 10 MILLIGRAM(S): 5 TABLET ORAL at 05:15

## 2017-07-07 RX ADMIN — Medication 325 MILLIGRAM(S): at 08:11

## 2017-07-07 RX ADMIN — ATORVASTATIN CALCIUM 10 MILLIGRAM(S): 80 TABLET, FILM COATED ORAL at 21:42

## 2017-07-07 RX ADMIN — Medication 100 MILLIGRAM(S): at 06:32

## 2017-07-07 RX ADMIN — Medication 25 MILLIGRAM(S): at 06:31

## 2017-07-07 RX ADMIN — Medication 100 MILLIGRAM(S): at 04:20

## 2017-07-07 RX ADMIN — PANTOPRAZOLE SODIUM 40 MILLIGRAM(S): 20 TABLET, DELAYED RELEASE ORAL at 12:15

## 2017-07-07 RX ADMIN — Medication 100 MILLIGRAM(S): at 13:51

## 2017-07-07 RX ADMIN — Medication 1 MILLIGRAM(S): at 12:15

## 2017-07-07 RX ADMIN — OXYCODONE HYDROCHLORIDE 10 MILLIGRAM(S): 5 TABLET ORAL at 22:42

## 2017-07-07 RX ADMIN — Medication 500 MILLIGRAM(S): at 06:32

## 2017-07-07 RX ADMIN — SODIUM CHLORIDE 3 MILLILITER(S): 9 INJECTION INTRAMUSCULAR; INTRAVENOUS; SUBCUTANEOUS at 13:50

## 2017-07-07 RX ADMIN — POLYETHYLENE GLYCOL 3350 17 GRAM(S): 17 POWDER, FOR SOLUTION ORAL at 12:15

## 2017-07-07 RX ADMIN — Medication 2: at 12:15

## 2017-07-07 RX ADMIN — OXYCODONE HYDROCHLORIDE 10 MILLIGRAM(S): 5 TABLET ORAL at 21:42

## 2017-07-07 RX ADMIN — SODIUM CHLORIDE 75 MILLILITER(S): 9 INJECTION, SOLUTION INTRAVENOUS at 03:31

## 2017-07-07 RX ADMIN — OXYCODONE HYDROCHLORIDE 10 MILLIGRAM(S): 5 TABLET ORAL at 16:00

## 2017-07-07 RX ADMIN — SODIUM CHLORIDE 3 MILLILITER(S): 9 INJECTION INTRAMUSCULAR; INTRAVENOUS; SUBCUTANEOUS at 21:33

## 2017-07-07 NOTE — OCCUPATIONAL THERAPY INITIAL EVALUATION ADULT - RANGE OF MOTION EXAMINATION, LOWER EXTREMITY
Left LE Active ROM was WNL  (within normal limits)/AAROM in right hip 0-45 degrees with  pain at the end range/Left LE Passive ROM was WNL (within normal limits)

## 2017-07-07 NOTE — OCCUPATIONAL THERAPY INITIAL EVALUATION ADULT - GENERAL OBSERVATIONS, REHAB EVAL
Pt was  encountered  OOB to chair; pt was AA&Ox4, cooperative & followed commands. All precautions  were reviwed and maintained; pt presents with pain, stiffness, decreased ROM in right hip due to s/p THR; these deficits limits pt's performance with self care tasks  and functional mobility; Pt was  encountered  OOB to chair; pt was AA&Ox4, cooperative & followed commands. All precautions were reviewed and maintained; pt presents with pain, stiffness, decreased ROM in right hip due to s/p THR; these deficits limits pt's performance with self care tasks  and functional mobility;

## 2017-07-07 NOTE — OCCUPATIONAL THERAPY INITIAL EVALUATION ADULT - PLANNED THERAPY INTERVENTIONS, OT EVAL
balance training/neuromuscular re-education/ADL retraining/parent/caregiver training.../energy conservation techniques/strengthening/stretching/joint mobilization/ROM/transfer training/bed mobility training/motor coordination training/IADL retraining/fine motor coordination training

## 2017-07-07 NOTE — OCCUPATIONAL THERAPY INITIAL EVALUATION ADULT - TRANSFER SAFETY CONCERNS NOTED: BED/CHAIR, REHAB EVAL
decreased weight-shifting ability/decreased balance during turns/decreased sequencing ability/stand pivot/decreased safety awareness/decreased step length

## 2017-07-07 NOTE — OCCUPATIONAL THERAPY INITIAL EVALUATION ADULT - SOCIAL CONCERNS
Complex psychosocial needs/coping issues/Pt voiced concerns  about the anticipated difficulty  of ascending and descending 27 step  to get to her apartment; Pt stated " I am afraid of falling; I won't  be able to get out if there is a fire; my grand son goes to work and I am home alone; my doctor told me this morning that I will have to go to rehab" Pt voiced concerns  about the anticipated difficulty  of ascending and descending 27 steps  to get to her apartment; Pt stated " I am afraid of falling; I won't  be able to get out if there is a fire; my grand son goes to work and I am home alone; my doctor told me this morning that I will have to go to rehab"/Complex psychosocial needs/coping issues

## 2017-07-07 NOTE — PROGRESS NOTE ADULT - SUBJECTIVE AND OBJECTIVE BOX
Pt S&E. Pain controlled. Resting comfortably. No acute events overnight.    Vital Signs Last 24 Hrs  T(C): 37.4 (07-07-17 @ 04:30), Max: 37.6 (07-06-17 @ 20:30)  T(F): 99.3 (07-07-17 @ 04:30), Max: 99.6 (07-06-17 @ 20:30)  HR: 75 (07-07-17 @ 04:30) (65 - 85)  BP: 115/59 (07-07-17 @ 04:30) (115/59 - 150/77)  BP(mean): --  RR: 16 (07-07-17 @ 04:30) (16 - 16)  SpO2: 98% (07-07-17 @ 04:30) (96% - 100%)    Gen: NAD  RLE:  Dsg c/d/i  SILT L2-S1  +EHL/FHL/TA/Gastroc  DP+  Soft compartments, - calf ttp

## 2017-07-07 NOTE — OCCUPATIONAL THERAPY INITIAL EVALUATION ADULT - ANTICIPATED DISCHARGE DISPOSITION, OT EVAL
Home with OT referral to assist with return to prior level of function, pending improvements with ADL, functional  mobility and pt can   safely and independently negotiate 27 steps with PT.

## 2017-07-07 NOTE — OCCUPATIONAL THERAPY INITIAL EVALUATION ADULT - ADDITIONAL COMMENTS
Prior to admission, pt was functioning in her roles, self sufficient & ambulating independently with a straight cane sometimes;  pt does not drive, but utilized access a-ride to get to her appointment or her insurance company provided car service ;  presently , pt needs assistance  with functional mobility and to complete self  due to right THR. The scale below depicts a picture of the pt's current level of functioning. Barthel Index: Feeding Score___10___, Bathing Score_____0_, Grooming Score__5___, Dressing Score___5__, Bowel Score_5____, Bladder Score___5___, Toilet Score___5__, Transfer Score___5___, Mobility Score___10__, Stairs Score___5__, Total Score____55/100_.

## 2017-07-07 NOTE — OCCUPATIONAL THERAPY INITIAL EVALUATION ADULT - LIVES WITH, PROFILE
her grandson  in an apartment on the 3 rd floor; the  building has no elevator  and pt has 3 flights of stair with 27 steps in total with rail on 1 side  ; pt bathroom has a tub / shower combination and is not equipped with  raised toilet seat  or grab bars; pt has   a shower chair her grandson  in an apartment on the 3 rd floor; the  building has no elevator  and pt has 3 flights of stair with 27 steps in total, with rail on 1 side  ; pt's bathroom has a tub / shower combination and is not equipped with  raised toilet seat  or grab bars; pt has   a shower chair

## 2017-07-07 NOTE — OCCUPATIONAL THERAPY INITIAL EVALUATION ADULT - PERSONAL SAFETY AND JUDGMENT, REHAB EVAL
however , pt needs verbal cues to safely negotiate turns   and to adhere to  posterior THP  with ADL and functional mobility/at risk behaviors demonstrated/intact

## 2017-07-07 NOTE — OCCUPATIONAL THERAPY INITIAL EVALUATION ADULT - PRECAUTIONS/LIMITATIONS, REHAB EVAL
posterior hip precaution, Pt exhibits diminished reaction time due to age related changes and fear of falling/fall precautions/right hip precautions

## 2017-07-07 NOTE — OCCUPATIONAL THERAPY INITIAL EVALUATION ADULT - MANUAL MUSCLE TESTING RESULTS, REHAB EVAL
fair + in KATHERINE/LLE; limitations  are noted in right LE due to s/p THR fair + in BUE/LLE; limitations  are noted in right LE due to s/p THR

## 2017-07-08 VITALS
DIASTOLIC BLOOD PRESSURE: 60 MMHG | SYSTOLIC BLOOD PRESSURE: 116 MMHG | RESPIRATION RATE: 16 BRPM | OXYGEN SATURATION: 95 % | TEMPERATURE: 99 F | HEART RATE: 101 BPM

## 2017-07-08 LAB
ANION GAP SERPL CALC-SCNC: 6 MMOL/L — SIGNIFICANT CHANGE UP (ref 5–17)
BUN SERPL-MCNC: 16 MG/DL — SIGNIFICANT CHANGE UP (ref 7–23)
CALCIUM SERPL-MCNC: 8.5 MG/DL — SIGNIFICANT CHANGE UP (ref 8.5–10.1)
CHLORIDE SERPL-SCNC: 104 MMOL/L — SIGNIFICANT CHANGE UP (ref 96–108)
CO2 SERPL-SCNC: 27 MMOL/L — SIGNIFICANT CHANGE UP (ref 22–31)
CREAT SERPL-MCNC: 0.94 MG/DL — SIGNIFICANT CHANGE UP (ref 0.5–1.3)
GLUCOSE SERPL-MCNC: 131 MG/DL — HIGH (ref 70–99)
HCT VFR BLD CALC: 32 % — LOW (ref 34.5–45)
HGB BLD-MCNC: 11 G/DL — LOW (ref 11.5–15.5)
MCHC RBC-ENTMCNC: 24.7 PG — LOW (ref 27–34)
MCHC RBC-ENTMCNC: 34.2 GM/DL — SIGNIFICANT CHANGE UP (ref 32–36)
MCV RBC AUTO: 72.2 FL — LOW (ref 80–100)
PLATELET # BLD AUTO: 164 K/UL — SIGNIFICANT CHANGE UP (ref 150–400)
POTASSIUM SERPL-MCNC: 4 MMOL/L — SIGNIFICANT CHANGE UP (ref 3.5–5.3)
POTASSIUM SERPL-SCNC: 4 MMOL/L — SIGNIFICANT CHANGE UP (ref 3.5–5.3)
RBC # BLD: 4.44 M/UL — SIGNIFICANT CHANGE UP (ref 3.8–5.2)
RBC # FLD: 14.3 % — SIGNIFICANT CHANGE UP (ref 11–15)
SODIUM SERPL-SCNC: 137 MMOL/L — SIGNIFICANT CHANGE UP (ref 135–145)
WBC # BLD: 8.9 K/UL — SIGNIFICANT CHANGE UP (ref 3.8–10.5)
WBC # FLD AUTO: 8.9 K/UL — SIGNIFICANT CHANGE UP (ref 3.8–10.5)

## 2017-07-08 RX ORDER — SENNA PLUS 8.6 MG/1
2 TABLET ORAL
Qty: 0 | Refills: 0 | DISCHARGE
Start: 2017-07-08

## 2017-07-08 RX ORDER — ONDANSETRON 8 MG/1
0 TABLET, FILM COATED ORAL
Qty: 0 | Refills: 0 | DISCHARGE
Start: 2017-07-08

## 2017-07-08 RX ORDER — OXYCODONE HYDROCHLORIDE 5 MG/1
1 TABLET ORAL
Qty: 0 | Refills: 0 | DISCHARGE
Start: 2017-07-08

## 2017-07-08 RX ORDER — DOCUSATE SODIUM 100 MG
1 CAPSULE ORAL
Qty: 0 | Refills: 0 | DISCHARGE
Start: 2017-07-08

## 2017-07-08 RX ADMIN — Medication 325 MILLIGRAM(S): at 08:28

## 2017-07-08 RX ADMIN — POLYETHYLENE GLYCOL 3350 17 GRAM(S): 17 POWDER, FOR SOLUTION ORAL at 11:38

## 2017-07-08 RX ADMIN — LOSARTAN POTASSIUM 25 MILLIGRAM(S): 100 TABLET, FILM COATED ORAL at 05:49

## 2017-07-08 RX ADMIN — Medication 325 MILLIGRAM(S): at 17:11

## 2017-07-08 RX ADMIN — Medication 1 TABLET(S): at 11:37

## 2017-07-08 RX ADMIN — SODIUM CHLORIDE 3 MILLILITER(S): 9 INJECTION INTRAMUSCULAR; INTRAVENOUS; SUBCUTANEOUS at 14:30

## 2017-07-08 RX ADMIN — Medication 650 MILLIGRAM(S): at 07:03

## 2017-07-08 RX ADMIN — OXYCODONE HYDROCHLORIDE 10 MILLIGRAM(S): 5 TABLET ORAL at 09:52

## 2017-07-08 RX ADMIN — AMLODIPINE BESYLATE 10 MILLIGRAM(S): 2.5 TABLET ORAL at 05:53

## 2017-07-08 RX ADMIN — PANTOPRAZOLE SODIUM 40 MILLIGRAM(S): 20 TABLET, DELAYED RELEASE ORAL at 11:37

## 2017-07-08 RX ADMIN — Medication 2: at 11:38

## 2017-07-08 RX ADMIN — OXYCODONE HYDROCHLORIDE 10 MILLIGRAM(S): 5 TABLET ORAL at 10:50

## 2017-07-08 RX ADMIN — Medication 1 MILLIGRAM(S): at 11:38

## 2017-07-08 RX ADMIN — SODIUM CHLORIDE 3 MILLILITER(S): 9 INJECTION INTRAMUSCULAR; INTRAVENOUS; SUBCUTANEOUS at 05:49

## 2017-07-08 RX ADMIN — Medication 100 MILLIGRAM(S): at 05:49

## 2017-07-08 RX ADMIN — Medication 10 MILLIGRAM(S): at 17:11

## 2017-07-08 RX ADMIN — Medication 325 MILLIGRAM(S): at 05:49

## 2017-07-08 RX ADMIN — Medication 325 MILLIGRAM(S): at 11:38

## 2017-07-08 RX ADMIN — Medication 25 MILLIGRAM(S): at 05:49

## 2017-07-08 RX ADMIN — Medication 500 MILLIGRAM(S): at 05:49

## 2017-07-08 RX ADMIN — Medication 500 MILLIGRAM(S): at 17:11

## 2017-07-08 RX ADMIN — Medication 100 MILLIGRAM(S): at 14:09

## 2017-07-08 NOTE — PROGRESS NOTE ADULT - SUBJECTIVE AND OBJECTIVE BOX
patient seen and examined at bedside. pain controlled    avss    PE:  RLE: DSG C/D/I  SILT L2-S1  +TA EHL FHL GSC  neg calf ttp  soft compartments  +DP

## 2017-07-08 NOTE — PROGRESS NOTE ADULT - ASSESSMENT
76F s/p R THR POD #2  pain control  dvt ppx  pt   wbat  posterior hip precautions  DC Planning ANTONIO

## 2017-07-11 DIAGNOSIS — M16.11 UNILATERAL PRIMARY OSTEOARTHRITIS, RIGHT HIP: ICD-10-CM

## 2017-07-11 DIAGNOSIS — M25.551 PAIN IN RIGHT HIP: ICD-10-CM

## 2017-07-20 ENCOUNTER — APPOINTMENT (OUTPATIENT)
Dept: ENDOCRINOLOGY | Facility: CLINIC | Age: 77
End: 2017-07-20

## 2017-07-31 ENCOUNTER — RESULT CHARGE (OUTPATIENT)
Age: 77
End: 2017-07-31

## 2017-07-31 ENCOUNTER — APPOINTMENT (OUTPATIENT)
Dept: ENDOCRINOLOGY | Facility: CLINIC | Age: 77
End: 2017-07-31
Payer: MEDICARE

## 2017-07-31 VITALS
SYSTOLIC BLOOD PRESSURE: 150 MMHG | HEIGHT: 66 IN | DIASTOLIC BLOOD PRESSURE: 60 MMHG | RESPIRATION RATE: 17 BRPM | HEART RATE: 77 BPM | TEMPERATURE: 97.7 F | OXYGEN SATURATION: 97 % | WEIGHT: 188 LBS | BODY MASS INDEX: 30.22 KG/M2

## 2017-07-31 LAB — GLUCOSE BLDC GLUCOMTR-MCNC: 70

## 2017-07-31 PROCEDURE — 99214 OFFICE O/P EST MOD 30 MIN: CPT

## 2017-08-01 LAB — HBA1C MFR BLD HPLC: 6.5 %

## 2017-08-11 LAB
ALBUMIN SERPL ELPH-MCNC: 4.5 G/DL
ALP BLD-CCNC: 102 U/L
ALT SERPL-CCNC: 9 U/L
ANION GAP SERPL CALC-SCNC: 19 MMOL/L
AST SERPL-CCNC: 16 U/L
BILIRUB SERPL-MCNC: 0.5 MG/DL
BUN SERPL-MCNC: 13 MG/DL
CALCIUM SERPL-MCNC: 10.2 MG/DL
CHLORIDE SERPL-SCNC: 97 MMOL/L
CO2 SERPL-SCNC: 23 MMOL/L
CREAT SERPL-MCNC: 1.01 MG/DL
GLUCOSE SERPL-MCNC: 86 MG/DL
POTASSIUM SERPL-SCNC: 4.4 MMOL/L
PROT SERPL-MCNC: 7.6 G/DL
SODIUM SERPL-SCNC: 139 MMOL/L

## 2018-08-06 PROBLEM — E66.9 OBESITY, UNSPECIFIED: Chronic | Status: ACTIVE | Noted: 2017-06-21

## 2018-08-06 PROBLEM — E11.8 TYPE 2 DIABETES MELLITUS WITH UNSPECIFIED COMPLICATIONS: Chronic | Status: ACTIVE | Noted: 2017-06-21

## 2018-08-06 PROBLEM — I10 ESSENTIAL (PRIMARY) HYPERTENSION: Chronic | Status: ACTIVE | Noted: 2017-06-21

## 2018-08-13 ENCOUNTER — APPOINTMENT (OUTPATIENT)
Dept: SPINE | Facility: CLINIC | Age: 78
End: 2018-08-13
Payer: MEDICARE

## 2018-08-13 VITALS
HEART RATE: 65 BPM | BODY MASS INDEX: 29.57 KG/M2 | WEIGHT: 184 LBS | HEIGHT: 66 IN | DIASTOLIC BLOOD PRESSURE: 78 MMHG | SYSTOLIC BLOOD PRESSURE: 159 MMHG

## 2018-08-13 DIAGNOSIS — M54.2 CERVICALGIA: ICD-10-CM

## 2018-08-13 DIAGNOSIS — E11.9 TYPE 2 DIABETES MELLITUS W/OUT COMPLICATIONS: ICD-10-CM

## 2018-08-13 DIAGNOSIS — Z78.9 OTHER SPECIFIED HEALTH STATUS: ICD-10-CM

## 2018-08-13 DIAGNOSIS — Z86.79 PERSONAL HISTORY OF OTHER DISEASES OF THE CIRCULATORY SYSTEM: ICD-10-CM

## 2018-08-13 PROCEDURE — 99203 OFFICE O/P NEW LOW 30 MIN: CPT

## 2018-08-13 RX ORDER — ASPIRIN 81 MG
81 TABLET, DELAYED RELEASE (ENTERIC COATED) ORAL
Refills: 0 | Status: ACTIVE | COMMUNITY

## 2018-08-13 RX ORDER — LOSARTAN POTASSIUM 25 MG/1
25 TABLET, FILM COATED ORAL
Refills: 0 | Status: ACTIVE | COMMUNITY

## 2018-08-13 RX ORDER — METOPROLOL TARTRATE 25 MG/1
25 TABLET, FILM COATED ORAL
Refills: 0 | Status: ACTIVE | COMMUNITY

## 2018-08-13 RX ORDER — ATORVASTATIN CALCIUM 10 MG/1
10 TABLET, FILM COATED ORAL
Refills: 0 | Status: ACTIVE | COMMUNITY

## 2018-08-13 RX ORDER — FOLIC ACID 1 MG/1
1 TABLET ORAL
Refills: 0 | Status: ACTIVE | COMMUNITY

## 2018-08-13 RX ORDER — AMLODIPINE BESYLATE 10 MG/1
10 TABLET ORAL
Refills: 0 | Status: ACTIVE | COMMUNITY

## 2018-08-13 RX ORDER — CLOPIDOGREL BISULFATE 75 MG/1
75 TABLET, FILM COATED ORAL
Refills: 0 | Status: ACTIVE | COMMUNITY

## 2018-08-13 RX ORDER — LINAGLIPTIN AND METFORMIN HYDROCHLORIDE 2.5; 1 MG/1; MG/1
2.5-1 TABLET, FILM COATED ORAL
Refills: 0 | Status: ACTIVE | COMMUNITY

## 2018-10-22 ENCOUNTER — APPOINTMENT (OUTPATIENT)
Dept: SPINE | Facility: CLINIC | Age: 78
End: 2018-10-22
Payer: MEDICARE

## 2018-10-22 VITALS
BODY MASS INDEX: 30.7 KG/M2 | HEIGHT: 66 IN | SYSTOLIC BLOOD PRESSURE: 166 MMHG | HEART RATE: 64 BPM | WEIGHT: 191 LBS | DIASTOLIC BLOOD PRESSURE: 81 MMHG

## 2018-10-22 DIAGNOSIS — M48.00 SPINAL STENOSIS, SITE UNSPECIFIED: ICD-10-CM

## 2018-10-22 PROCEDURE — 99213 OFFICE O/P EST LOW 20 MIN: CPT

## 2020-01-02 ENCOUNTER — OUTPATIENT (OUTPATIENT)
Dept: OUTPATIENT SERVICES | Facility: HOSPITAL | Age: 80
LOS: 1 days | End: 2020-01-02
Payer: MEDICARE

## 2020-01-02 VITALS
OXYGEN SATURATION: 97 % | DIASTOLIC BLOOD PRESSURE: 78 MMHG | RESPIRATION RATE: 14 BRPM | HEART RATE: 87 BPM | WEIGHT: 181 LBS | TEMPERATURE: 98 F | HEIGHT: 66 IN | SYSTOLIC BLOOD PRESSURE: 136 MMHG

## 2020-01-02 DIAGNOSIS — G56.01 CARPAL TUNNEL SYNDROME, RIGHT UPPER LIMB: ICD-10-CM

## 2020-01-02 DIAGNOSIS — G56.02 CARPAL TUNNEL SYNDROME, LEFT UPPER LIMB: ICD-10-CM

## 2020-01-02 DIAGNOSIS — G56.00 CARPAL TUNNEL SYNDROME, UNSPECIFIED UPPER LIMB: ICD-10-CM

## 2020-01-02 DIAGNOSIS — E11.9 TYPE 2 DIABETES MELLITUS WITHOUT COMPLICATIONS: ICD-10-CM

## 2020-01-02 DIAGNOSIS — M65.342 TRIGGER FINGER, LEFT RING FINGER: ICD-10-CM

## 2020-01-02 DIAGNOSIS — I10 ESSENTIAL (PRIMARY) HYPERTENSION: ICD-10-CM

## 2020-01-02 DIAGNOSIS — N60.09 SOLITARY CYST OF UNSPECIFIED BREAST: Chronic | ICD-10-CM

## 2020-01-02 DIAGNOSIS — Z96.642 PRESENCE OF LEFT ARTIFICIAL HIP JOINT: Chronic | ICD-10-CM

## 2020-01-02 DIAGNOSIS — Z01.818 ENCOUNTER FOR OTHER PREPROCEDURAL EXAMINATION: ICD-10-CM

## 2020-01-02 DIAGNOSIS — Z90.710 ACQUIRED ABSENCE OF BOTH CERVIX AND UTERUS: Chronic | ICD-10-CM

## 2020-01-02 LAB
ANION GAP SERPL CALC-SCNC: 10 MMOL/L — SIGNIFICANT CHANGE UP (ref 5–17)
BUN SERPL-MCNC: 14 MG/DL — SIGNIFICANT CHANGE UP (ref 7–23)
CALCIUM SERPL-MCNC: 9.8 MG/DL — SIGNIFICANT CHANGE UP (ref 8.4–10.5)
CHLORIDE SERPL-SCNC: 101 MMOL/L — SIGNIFICANT CHANGE UP (ref 96–108)
CO2 SERPL-SCNC: 26 MMOL/L — SIGNIFICANT CHANGE UP (ref 22–31)
CREAT SERPL-MCNC: 0.87 MG/DL — SIGNIFICANT CHANGE UP (ref 0.5–1.3)
GLUCOSE SERPL-MCNC: 133 MG/DL — HIGH (ref 70–99)
HBA1C BLD-MCNC: 6.1 % — HIGH (ref 4–5.6)
HCT VFR BLD CALC: 39 % — SIGNIFICANT CHANGE UP (ref 34.5–45)
HGB BLD-MCNC: 12 G/DL — SIGNIFICANT CHANGE UP (ref 11.5–15.5)
MCHC RBC-ENTMCNC: 23.1 PG — LOW (ref 27–34)
MCHC RBC-ENTMCNC: 30.8 GM/DL — LOW (ref 32–36)
MCV RBC AUTO: 75.1 FL — LOW (ref 80–100)
PLATELET # BLD AUTO: 233 K/UL — SIGNIFICANT CHANGE UP (ref 150–400)
POTASSIUM SERPL-MCNC: 4.1 MMOL/L — SIGNIFICANT CHANGE UP (ref 3.5–5.3)
POTASSIUM SERPL-SCNC: 4.1 MMOL/L — SIGNIFICANT CHANGE UP (ref 3.5–5.3)
RBC # BLD: 5.19 M/UL — SIGNIFICANT CHANGE UP (ref 3.8–5.2)
RBC # FLD: 16.5 % — HIGH (ref 10.3–14.5)
SODIUM SERPL-SCNC: 137 MMOL/L — SIGNIFICANT CHANGE UP (ref 135–145)
WBC # BLD: 5.85 K/UL — SIGNIFICANT CHANGE UP (ref 3.8–10.5)
WBC # FLD AUTO: 5.85 K/UL — SIGNIFICANT CHANGE UP (ref 3.8–10.5)

## 2020-01-02 PROCEDURE — 80048 BASIC METABOLIC PNL TOTAL CA: CPT

## 2020-01-02 PROCEDURE — 83036 HEMOGLOBIN GLYCOSYLATED A1C: CPT

## 2020-01-02 PROCEDURE — G0463: CPT

## 2020-01-02 PROCEDURE — 85027 COMPLETE CBC AUTOMATED: CPT

## 2020-01-02 RX ORDER — LOSARTAN POTASSIUM 100 MG/1
1 TABLET, FILM COATED ORAL
Qty: 0 | Refills: 0 | DISCHARGE

## 2020-01-02 RX ORDER — SODIUM CHLORIDE 9 MG/ML
3 INJECTION INTRAMUSCULAR; INTRAVENOUS; SUBCUTANEOUS EVERY 8 HOURS
Refills: 0 | Status: DISCONTINUED | OUTPATIENT
Start: 2020-01-08 | End: 2020-02-05

## 2020-01-02 RX ORDER — CELECOXIB 200 MG/1
200 CAPSULE ORAL ONCE
Refills: 0 | Status: DISCONTINUED | OUTPATIENT
Start: 2020-01-08 | End: 2020-02-05

## 2020-01-02 RX ORDER — LIDOCAINE HCL 20 MG/ML
0.2 VIAL (ML) INJECTION ONCE
Refills: 0 | Status: DISCONTINUED | OUTPATIENT
Start: 2020-01-08 | End: 2020-02-05

## 2020-01-02 RX ORDER — METOPROLOL TARTRATE 50 MG
25 TABLET ORAL
Qty: 0 | Refills: 0 | DISCHARGE

## 2020-01-02 RX ORDER — CANAGLIFLOZIN 100 MG/1
1 TABLET, FILM COATED ORAL
Qty: 0 | Refills: 0 | DISCHARGE

## 2020-01-02 NOTE — H&P PST ADULT - NSICDXPASTMEDICALHX_GEN_ALL_CORE_FT
PAST MEDICAL HISTORY:  Essential hypertension     Obesity, unspecified obesity severity, unspecified obesity type     Type 2 diabetes mellitus with complication, without long-term current use of insulin PAST MEDICAL HISTORY:  Carpal tunnel syndrome bilateral    Essential hypertension     History of congestive heart disease as per patient she had chf diagnosed in 2017    Hypothyroidism     OA (osteoarthritis)     Obesity, unspecified obesity severity, unspecified obesity type     Type 2 diabetes mellitus with complication, without long-term current use of insulin

## 2020-01-02 NOTE — H&P PST ADULT - MUSCULOSKELETAL
details… detailed exam no joint swelling/no joint warmth/ROM intact/no calf tenderness/no joint erythema/normal strength

## 2020-01-02 NOTE — H&P PST ADULT - RS GEN PE MLT RESP DETAILS PC
breath sounds equal/no rhonchi/no intercostal retractions/clear to auscultation bilaterally/no rales

## 2020-01-02 NOTE — H&P PST ADULT - OTHER CARE PROVIDERS
[facility] [place] [phone] op visit 01/03/2020 [facility] [place] [phone] op visit 01/03/2020, Endo Dr velasco

## 2020-01-02 NOTE — H&P PST ADULT - NEUROLOGICAL DETAILS
alert and oriented x 3/responds to pain/responds to verbal commands/normal strength/cranial nerves intact

## 2020-01-02 NOTE — H&P PST ADULT - NSICDXPROBLEM_GEN_ALL_CORE_FT
PROBLEM DIAGNOSES  Problem: HTN (hypertension)  Assessment and Plan: Instructed to continue meds &  take with sips of water in AM the day of surgery     Problem: Carpal tunnel syndrome  Assessment and Plan: right carpal tunnel release & flexor tenosynovectomy  right third trigger finger release    Problem: DM (diabetes mellitus)  Assessment and Plan: Will follow up with labs/ fingerstick.    HgA1c ordered   Instructed to hold Glimepride & jentadueto , last dose 01/07/19 7 am.  STAT FS  on the day of surgery ordered

## 2020-01-02 NOTE — H&P PST ADULT - NSICDXPASTSURGICALHX_GEN_ALL_CORE_FT
PAST SURGICAL HISTORY:  Cyst of breast, unspecified laterality bilateral excisions    H/O: hysterectomy PAST SURGICAL HISTORY:  Cyst of breast, unspecified laterality bilateral excisions    H/O: hysterectomy     History of total left hip replacement 2017, RIGHT HIP

## 2020-01-02 NOTE — H&P PST ADULT - NSANTHOSAYNRD_GEN_A_CORE
No. MARIN screening performed.  STOP BANG Legend: 0-2 = LOW Risk; 3-4 = INTERMEDIATE Risk; 5-8 = HIGH Risk

## 2020-01-02 NOTE — H&P PST ADULT - HISTORY OF PRESENT ILLNESS
79 year old left handed female presents to PST with carpal tunnel pain in both hands, now scheduled for right carpal tunnel surgery on 01/08/2020.

## 2020-01-02 NOTE — H&P PST ADULT - ATTENDING COMMENTS
The patient is admitted today for a  right CTR and flexor tenosynovectomy and III trigger release.  I have reviewed the procedure in detail again today with the patient.  The numerous risks, benefits, alternatives, possible complications and expectations are reviewed.  All questions have been thoroughly answered and the patient has verbalized understanding of all of the above and gives consent to proceed

## 2020-01-07 ENCOUNTER — TRANSCRIPTION ENCOUNTER (OUTPATIENT)
Age: 80
End: 2020-01-07

## 2020-01-07 RX ORDER — OXYCODONE HYDROCHLORIDE 5 MG/1
5 TABLET ORAL ONCE
Refills: 0 | Status: DISCONTINUED | OUTPATIENT
Start: 2020-01-08 | End: 2020-01-08

## 2020-01-07 RX ORDER — SODIUM CHLORIDE 9 MG/ML
1000 INJECTION, SOLUTION INTRAVENOUS
Refills: 0 | Status: DISCONTINUED | OUTPATIENT
Start: 2020-01-08 | End: 2020-02-05

## 2020-01-07 RX ORDER — ONDANSETRON 8 MG/1
4 TABLET, FILM COATED ORAL ONCE
Refills: 0 | Status: DISCONTINUED | OUTPATIENT
Start: 2020-01-08 | End: 2020-02-05

## 2020-01-08 ENCOUNTER — OUTPATIENT (OUTPATIENT)
Dept: OUTPATIENT SERVICES | Facility: HOSPITAL | Age: 80
LOS: 1 days | End: 2020-01-08
Payer: MEDICARE

## 2020-01-08 ENCOUNTER — RESULT REVIEW (OUTPATIENT)
Age: 80
End: 2020-01-08

## 2020-01-08 VITALS
SYSTOLIC BLOOD PRESSURE: 127 MMHG | WEIGHT: 181 LBS | RESPIRATION RATE: 15 BRPM | TEMPERATURE: 98 F | HEART RATE: 75 BPM | OXYGEN SATURATION: 97 % | DIASTOLIC BLOOD PRESSURE: 70 MMHG | HEIGHT: 66 IN

## 2020-01-08 VITALS
TEMPERATURE: 97 F | SYSTOLIC BLOOD PRESSURE: 156 MMHG | HEART RATE: 67 BPM | OXYGEN SATURATION: 100 % | DIASTOLIC BLOOD PRESSURE: 80 MMHG | RESPIRATION RATE: 16 BRPM

## 2020-01-08 DIAGNOSIS — G56.02 CARPAL TUNNEL SYNDROME, LEFT UPPER LIMB: ICD-10-CM

## 2020-01-08 DIAGNOSIS — M65.331 TRIGGER FINGER, RIGHT MIDDLE FINGER: ICD-10-CM

## 2020-01-08 DIAGNOSIS — G56.01 CARPAL TUNNEL SYNDROME, RIGHT UPPER LIMB: ICD-10-CM

## 2020-01-08 DIAGNOSIS — M65.342 TRIGGER FINGER, LEFT RING FINGER: ICD-10-CM

## 2020-01-08 DIAGNOSIS — Z96.642 PRESENCE OF LEFT ARTIFICIAL HIP JOINT: Chronic | ICD-10-CM

## 2020-01-08 DIAGNOSIS — N60.09 SOLITARY CYST OF UNSPECIFIED BREAST: Chronic | ICD-10-CM

## 2020-01-08 DIAGNOSIS — Z90.710 ACQUIRED ABSENCE OF BOTH CERVIX AND UTERUS: Chronic | ICD-10-CM

## 2020-01-08 LAB — GLUCOSE BLDC GLUCOMTR-MCNC: 141 MG/DL — HIGH (ref 70–99)

## 2020-01-08 PROCEDURE — 26180 REMOVAL OF FINGER TENDON: CPT | Mod: F7

## 2020-01-08 PROCEDURE — 25115 REMOVE WRIST/FOREARM LESION: CPT | Mod: RT

## 2020-01-08 PROCEDURE — 88304 TISSUE EXAM BY PATHOLOGIST: CPT | Mod: 26

## 2020-01-08 PROCEDURE — 88304 TISSUE EXAM BY PATHOLOGIST: CPT

## 2020-01-08 PROCEDURE — 82962 GLUCOSE BLOOD TEST: CPT

## 2020-01-08 RX ORDER — ACETAMINOPHEN 500 MG
1000 TABLET ORAL ONCE
Refills: 0 | Status: DISCONTINUED | OUTPATIENT
Start: 2020-01-08 | End: 2020-01-08

## 2020-01-08 RX ORDER — CHLORHEXIDINE GLUCONATE 213 G/1000ML
1 SOLUTION TOPICAL ONCE
Refills: 0 | Status: COMPLETED | OUTPATIENT
Start: 2020-01-08 | End: 2020-01-08

## 2020-01-08 RX ORDER — CELECOXIB 200 MG/1
200 CAPSULE ORAL ONCE
Refills: 0 | Status: DISCONTINUED | OUTPATIENT
Start: 2020-01-08 | End: 2020-01-08

## 2020-01-08 RX ADMIN — CHLORHEXIDINE GLUCONATE 1 APPLICATION(S): 213 SOLUTION TOPICAL at 10:47

## 2020-01-08 NOTE — BRIEF OPERATIVE NOTE - NSICDXBRIEFPOSTOP_GEN_ALL_CORE_FT
POST-OP DIAGNOSIS:  S/P trigger finger release 08-Jan-2020 11:44:15  Jourdan Suazo  Right carpal tunnel syndrome 08-Jan-2020 11:44:07  Jourdan Suazo

## 2020-01-08 NOTE — ASU PATIENT PROFILE, ADULT - IS PATIENT PREGNANT?
no The patient presented with a 3-day history of progressive SOB associated with cough and productive sputum. She was seen by pulmonologist and was prescribed oral steroids and antibiotic but her symptoms did not improve. During her hospitalization, the patient was treated with IV steroids, IV antibiotics, and nebulizer therapy. She was also noted to have oral thrush on exam so she was treated with anti-fungal suspension. The patients respiratory status improved during her hospital course and she is agreeable to discharge home with plan for oral steroid taper and outpatient follow-up.

## 2020-01-08 NOTE — ASU PATIENT PROFILE, PEDIATRIC - PMH
Carpal tunnel syndrome  bilateral  Essential hypertension    History of congestive heart disease  as per patient she had chf diagnosed in 2017  Hypothyroidism    OA (osteoarthritis)    Obesity, unspecified obesity severity, unspecified obesity type    Type 2 diabetes mellitus with complication, without long-term current use of insulin

## 2020-01-08 NOTE — BRIEF OPERATIVE NOTE - OPERATION/FINDINGS
right open carpal tunnel release with flexor tenosynovectomy. trigger finger release right third digit.

## 2020-01-08 NOTE — ASU PATIENT PROFILE, PEDIATRIC - PSH
Cyst of breast, unspecified laterality  bilateral excisions  H/O: hysterectomy    History of total left hip replacement  2017, RIGHT HIP

## 2020-01-08 NOTE — ASU DISCHARGE PLAN (ADULT/PEDIATRIC) - CALL YOUR DOCTOR IF YOU HAVE ANY OF THE FOLLOWING:
Bleeding that does not stop/Swelling that gets worse/Wound/Surgical Site with redness, or foul smelling discharge or pus/Fever greater than (need to indicate Fahrenheit or Celsius)/Inability to tolerate liquids or foods/Numbness, tingling, color or temperature change to extremity/Nausea and vomiting that does not stop/Pain not relieved by Medications

## 2020-01-08 NOTE — ASU DISCHARGE PLAN (ADULT/PEDIATRIC) - CARE PROVIDER_API CALL
Eddi Trevino)  Orthopaedic Surgery; Surgery of the Hand  [address] [place] [address]  Phone: [phone]  Fax: [phone]  Follow Up Time: 1 week

## 2020-01-08 NOTE — ASU DISCHARGE PLAN (ADULT/PEDIATRIC) - PROCEDURE
open release of right carpal tunnel with flexor tenosynovectomy, right third digit trigger finger release

## 2020-01-08 NOTE — BRIEF OPERATIVE NOTE - NSICDXBRIEFPROCEDURE_GEN_ALL_CORE_FT
PROCEDURES:  Flexor tenosynovectomy of right wrist 08-Jan-2020 11:43:24  Jourdan Suazo  Trigger finger release 08-Jan-2020 11:43:04 right 3rd digit Jourdan Suazo  Open release of right carpal tunnel 08-Jan-2020 11:42:57  Jourdan Suazo

## 2020-01-08 NOTE — ASU DISCHARGE PLAN (ADULT/PEDIATRIC) - ASU DC SPECIAL INSTRUCTIONSFT
Please leave the dressing in place and keep clean, dry, and intact until Saturday. On Saturday you may remove the dressing and shower. Please do not scrub the area and pat dry. cover incisions with small bandage daily.     Please take 1000mg tylenol every 6 hours and or 400mg ibuprofen every 6 hours as needed for pain.     Dr. Trevino's office will call to schedule a follow up appointment for within 1 week from today.     Please continue to bend the fingers regularly to avoid stiffness.

## 2020-01-08 NOTE — BRIEF OPERATIVE NOTE - NSICDXBRIEFPREOP_GEN_ALL_CORE_FT
PRE-OP DIAGNOSIS:  Right trigger finger 08-Jan-2020 11:43:56  Jourdan Suazo  Right carpal tunnel syndrome 08-Jan-2020 11:43:49  Jourdan Suazo

## 2020-01-14 LAB — SURGICAL PATHOLOGY STUDY: SIGNIFICANT CHANGE UP

## 2022-05-01 NOTE — DISCHARGE NOTE ADULT - MEDICATION SUMMARY - MEDICATIONS TO STOP TAKING
"Julius Duran" Sudhir was seen and treated in our emergency department on 5/1/2022.  She may return to work on 05/04/2022.       If you have any questions or concerns, please don't hesitate to call.      Andrew Wood NP" I will STOP taking the medications listed below when I get home from the hospital:    meloxicam 15 mg oral tablet  -- 1 tab(s) by mouth once a day    diclofenac sodium 75 mg oral delayed release tablet  -- 1 tab(s) by mouth once a day

## 2022-10-04 PROBLEM — G56.00 CARPAL TUNNEL SYNDROME, UNSPECIFIED UPPER LIMB: Chronic | Status: ACTIVE | Noted: 2020-01-02

## 2022-10-04 PROBLEM — M19.90 UNSPECIFIED OSTEOARTHRITIS, UNSPECIFIED SITE: Chronic | Status: ACTIVE | Noted: 2020-01-02

## 2022-10-04 PROBLEM — E03.9 HYPOTHYROIDISM, UNSPECIFIED: Chronic | Status: ACTIVE | Noted: 2020-01-02

## 2022-10-04 PROBLEM — Z86.79 PERSONAL HISTORY OF OTHER DISEASES OF THE CIRCULATORY SYSTEM: Chronic | Status: ACTIVE | Noted: 2020-01-02

## 2022-10-19 ENCOUNTER — APPOINTMENT (OUTPATIENT)
Dept: ORTHOPEDIC SURGERY | Facility: CLINIC | Age: 82
End: 2022-10-19

## 2022-10-19 DIAGNOSIS — M17.12 UNILATERAL PRIMARY OSTEOARTHRITIS, LEFT KNEE: ICD-10-CM

## 2022-10-19 DIAGNOSIS — M25.562 PAIN IN LEFT KNEE: ICD-10-CM

## 2022-10-19 PROCEDURE — 99204 OFFICE O/P NEW MOD 45 MIN: CPT | Mod: 25

## 2022-10-19 PROCEDURE — 20610 DRAIN/INJ JOINT/BURSA W/O US: CPT | Mod: LT

## 2022-10-19 PROCEDURE — 73564 X-RAY EXAM KNEE 4 OR MORE: CPT | Mod: LT

## 2022-10-19 RX ORDER — NAPROXEN 500 MG/1
500 TABLET ORAL
Qty: 60 | Refills: 0 | Status: ACTIVE | COMMUNITY
Start: 2022-10-19 | End: 1900-01-01

## 2022-10-19 NOTE — PROCEDURE
[Large Joint Injection] : Large joint injection [Left] : of the left [Knee] : knee [Pain] : pain [Inflammation] : inflammation [X-ray evidence of Osteoarthritis on this or prior visit] : x-ray evidence of Osteoarthritis on this or prior visit [Alcohol] : alcohol [Betadine] : betadine [Ethyl Chloride sprayed topically] : ethyl chloride sprayed topically [Sterile technique used] : sterile technique used [___ cc    6mg] :  Betamethasone (Celestone) ~Vcc of 6mg [___ cc    1%] : Lidocaine ~Vcc of 1%  [___ cc    0.5%] : Bupivacaine (Marcaine) ~Vcc of 0.5%  [Effusion] : effusion [] : Patient tolerated procedure well [Call if redness, pain or fever occur] : call if redness, pain or fever occur [Apply ice for 15min out of every hour for the next 12-24 hours as tolerated] : apply ice for 15 minutes out of every hour for the next 12-24 hours as tolerated [Risks, benefits, alternatives discussed / Verbal consent obtained] : the risks benefits, and alternatives have been discussed, and verbal consent was obtained [de-identified] : 10cc [de-identified] : Straw colored fluid

## 2022-10-19 NOTE — DISCUSSION/SUMMARY
[de-identified] : Assessment: The patient is a 82 year old female with LT knee pain and physical exam findings consistent with OA LT knee.\par \par Patient and I discussed their symptoms. Discussed findings of today's exam and possible causes of patient's pain. Educated patient on their most probable diagnosis. Reviewed possible courses of treatment, and we collaboratively decided best course of treatment at this time will include:\par \par 1. CSI LT knee with aspiration \par 2. PT  \par 3. Discussed HA injections, will consider \par 4. Start naproxen\par \par The patient's current medication management of their orthopedic diagnosis was reviewed today: \par \par (1) We discussed a comprehensive treatment plan that included possible pharmaceutical management involving the use of prescription strength medications including but not limited to options such as oral Naprosyn 500mg BID, once daily Meloxicam 15 mg, or 500-650 mg Tylenol versus over the counter oral medications and topical prescription NSAID Pennsaid vs over the counter Voltaren gel. \par \par (2) There is a moderate risk of morbidity with further treatment, especially from use of prescription strength medications and possible side effects of these medications which include upset stomach with oral medications, skin reactions to topical medications and cardiac/renal issues with long term use. \par \par (3) I recommended that the patient follow-up with their medical physician to discuss any significant specific potential issues with long term medication use such as interactions with current medications or with exacerbation of underlying medical comorbidities. \par \par (4) The benefits and risks associated with use of injectable, oral or topical, prescription and over the counter anti-inflammatory medications were discussed with the patient. The patient voiced understanding of the risks including but not limited to bleeding, stroke, kidney dysfunction, heart disease, and were referred to the black box warning label for further information.\par \par Follow up in [3] months \par \par History, physical exam, imaging, assessment and plan documented by Aron Moody. The documentation recorded by the scribe accurately reflects the service I, Amado Atwood MD, personally performed and the decisions made by me.

## 2022-10-19 NOTE — HISTORY OF PRESENT ILLNESS
[Dull/Aching] : dull/aching [Intermittent] : intermittent [Meds] : meds [Walking] : walking [de-identified] : MARIA A TAVAREZ is here today as a new patient for L knee pain, chronic. Pt denies acute fall/trauma, no weakness, no n/t. Pt not taking NSAIDs, no recent injections, not doing PT. No h/o knee surgery. She is diabetic. [] : no [FreeTextEntry1] : left knee  [FreeTextEntry5] : MARIA A is a 82 year old female here today for left knee pain. pt states pain with prolonged walking. swelling in knee today.  [FreeTextEntry7] : left knee to foot  [FreeTextEntry9] : tylenol

## 2022-10-19 NOTE — IMAGING
[Left] : left knee [All Views] : anteroposterior, lateral, skyline, and anteroposterior standing [There are no fractures, subluxations or dislocations. No significant abnormalities are seen] : There are no fractures, subluxations or dislocations. No significant abnormalities are seen [Degenerative change] : Degenerative change [advanced tricompartmental OA with medial compartment narrowing and varus alignment] : advanced tricompartmental OA with medial compartment narrowing and varus alignment [de-identified] : The patient is a well appearing 82 year old female of their stated age. Patient ambulates with a ANTALGIC gait. Negative straight leg raise bilateral  General: in no acute distress, seated comfortably, moving easily Skin: No discoloration, rashes; on palpation skin is dry,  Neuro: Normal sensation all dermatomes, motor all myotomes Vascular: Normal pulses, no edema, normal temperature Coordination and balance: Normal Psych: normal mood and affect, non pressured speech, alert and oriented x3  Right Knee:                         	 ROM:  0-145 degrees  Lachman: Negative Pivot Shift: Negative Anterior Drawer: Negative Posterior Drawer / Sag: Negative Varus Stress 0 degrees: Stable Varus Stress 30 degrees: Stable Valgus Stress 0 degrees: Stable Valgus Stress 30 degrees: Stable Medial Khris: Positive Lateral Khris: Negative Patella Glide: 2+ Patella Apprehension: Negative Patella Grind: Positive  Palpation: Medial Joint Line: TTP Lateral Joint Line: TTP Medial Collateral Ligament: Nontender Lateral Collateral Ligament/PLC: Nontender Distal Femur: Nontender Proximal Tibia: Nontender Tibial Tubercle: Nontender Distal Pole Patella: Nontender Quadriceps Tendon: Nontender &  Intact Patella Tendon: Nontender &  Intact Medial Distal Hamstring/PES: Nontender Lateral Distal Hamstring: Nontender & Stable Iliotibial Band: Nontender Medial Patellofemoral Ligament: Nontender Adductor: Nontender Proximal GSC-Plantaris: Nontender Calf: Supple & Nontender  Inspection: Deformity: No Erythema: No Ecchymosis: No Abrasions: No Effusion: Moderate Prepatellar Bursitis: No  Neurologic Exam: Sensation L4-S1: Grossly Intact  Motor Exam: Quadriceps: 5 out of 5 Hamstrings: 5 out of 5 EHL: 5 out of 5 FHL: 5 out of 5 TA: 5 out of 5 GS: 5 out of 5  Circulatory/Pulses: Dorsalis Pedis: 2+ Posterior Tibialis: 2+  Additional Pertinent Findings: None  Left Knee:                           	 ROM:  0-145 degrees  Lachman: Negative Pivot Shift: Negative Anterior Drawer: Negative Posterior Drawer / Sag: Negative Varus Stress 0 degrees: Stable Varus Stress 30 degrees: Stable Valgus Stress 0 degrees: Stable Valgus Stress 30 degrees: Stable Medial Khris: Positive Lateral Khris: Negative Patella Glide: 2+ Patella Apprehension: Negative Patella Grind: Positive  Palpation: Medial Joint Line: TTP Lateral Joint Line: TTP Medial Collateral Ligament: Nontender Lateral Collateral Ligament/PLC: Nontender Distal Femur: Nontender Proximal Tibia: Nontender Tibial Tubercle: Nontender Distal Pole Patella: Nontender Quadriceps Tendon: Nontender &  Intact Patella Tendon: Nontender &  Intact Medial Distal Hamstring/PES: Nontender Lateral Distal Hamstring: Nontender & Stable Iliotibial Band: Nontender Medial Patellofemoral Ligament: Nontender Adductor: Nontender Proximal GSC-Plantaris: Nontender Calf: Supple & Nontender  Inspection: Deformity: No Erythema: No Ecchymosis: No Abrasions: No Effusion: Moderate Prepatellar Bursitis: No  Neurologic Exam: Sensation L4-S1: Grossly Intact  Motor Exam: Quadriceps: 5 out of 5 Hamstrings: 5 out of 5 EHL: 5 out of 5 FHL: 5 out of 5 TA: 5 out of 5 GS: 5 out of 5  Circulatory/Pulses: Dorsalis Pedis: 2+ Posterior Tibialis: 2+

## 2023-01-18 ENCOUNTER — APPOINTMENT (OUTPATIENT)
Dept: ORTHOPEDIC SURGERY | Facility: CLINIC | Age: 83
End: 2023-01-18

## 2023-03-01 NOTE — PATIENT PROFILE ADULT. - CAREGIVER
Patient admitted to PACU # 7 from OR at 1444 post LEFT ANTERIOR TOTAL HIP REPLACEMENT WITH C-ARM  per Dr Nirmala Palma. Attached to PACU monitoring system and report received from anesthesia provider. Patient was reported to be hemodynamically stable during procedure. Patient alert on admission and denied pain. Declines

## 2024-02-19 ENCOUNTER — INPATIENT (INPATIENT)
Facility: HOSPITAL | Age: 84
LOS: 2 days | Discharge: HOME | End: 2024-02-22
Attending: STUDENT IN AN ORGANIZED HEALTH CARE EDUCATION/TRAINING PROGRAM | Admitting: STUDENT IN AN ORGANIZED HEALTH CARE EDUCATION/TRAINING PROGRAM
Payer: MEDICARE

## 2024-02-19 VITALS
RESPIRATION RATE: 16 BRPM | SYSTOLIC BLOOD PRESSURE: 126 MMHG | DIASTOLIC BLOOD PRESSURE: 76 MMHG | HEART RATE: 93 BPM | TEMPERATURE: 98 F | OXYGEN SATURATION: 100 %

## 2024-02-19 DIAGNOSIS — R07.9 CHEST PAIN, UNSPECIFIED: ICD-10-CM

## 2024-02-19 DIAGNOSIS — Z96.642 PRESENCE OF LEFT ARTIFICIAL HIP JOINT: Chronic | ICD-10-CM

## 2024-02-19 DIAGNOSIS — Z90.710 ACQUIRED ABSENCE OF BOTH CERVIX AND UTERUS: Chronic | ICD-10-CM

## 2024-02-19 DIAGNOSIS — N60.09 SOLITARY CYST OF UNSPECIFIED BREAST: Chronic | ICD-10-CM

## 2024-02-19 LAB
ADD ON TEST-SPECIMEN IN LAB: SIGNIFICANT CHANGE UP
ALBUMIN SERPL ELPH-MCNC: 4.2 G/DL — SIGNIFICANT CHANGE UP (ref 3.3–5)
ALP SERPL-CCNC: 90 U/L — SIGNIFICANT CHANGE UP (ref 40–120)
ALT FLD-CCNC: 36 U/L — HIGH (ref 4–33)
ANION GAP SERPL CALC-SCNC: 11 MMOL/L — SIGNIFICANT CHANGE UP (ref 7–14)
APTT BLD: 39.8 SEC — HIGH (ref 24.5–35.6)
AST SERPL-CCNC: 36 U/L — HIGH (ref 4–32)
BASOPHILS # BLD AUTO: 0.05 K/UL — SIGNIFICANT CHANGE UP (ref 0–0.2)
BASOPHILS NFR BLD AUTO: 0.9 % — SIGNIFICANT CHANGE UP (ref 0–2)
BILIRUB SERPL-MCNC: 0.8 MG/DL — SIGNIFICANT CHANGE UP (ref 0.2–1.2)
BUN SERPL-MCNC: 22 MG/DL — SIGNIFICANT CHANGE UP (ref 7–23)
CALCIUM SERPL-MCNC: 10 MG/DL — SIGNIFICANT CHANGE UP (ref 8.4–10.5)
CHLORIDE SERPL-SCNC: 98 MMOL/L — SIGNIFICANT CHANGE UP (ref 98–107)
CO2 SERPL-SCNC: 28 MMOL/L — SIGNIFICANT CHANGE UP (ref 22–31)
CREAT SERPL-MCNC: 1.16 MG/DL — SIGNIFICANT CHANGE UP (ref 0.5–1.3)
EGFR: 46 ML/MIN/1.73M2 — LOW
EOSINOPHIL # BLD AUTO: 0.41 K/UL — SIGNIFICANT CHANGE UP (ref 0–0.5)
EOSINOPHIL NFR BLD AUTO: 7.7 % — HIGH (ref 0–6)
GLUCOSE BLDC GLUCOMTR-MCNC: 108 MG/DL — HIGH (ref 70–99)
GLUCOSE SERPL-MCNC: 105 MG/DL — HIGH (ref 70–99)
HCT VFR BLD CALC: 40.4 % — SIGNIFICANT CHANGE UP (ref 34.5–45)
HGB BLD-MCNC: 12.7 G/DL — SIGNIFICANT CHANGE UP (ref 11.5–15.5)
IANC: 2.23 K/UL — SIGNIFICANT CHANGE UP (ref 1.8–7.4)
IMM GRANULOCYTES NFR BLD AUTO: 0 % — SIGNIFICANT CHANGE UP (ref 0–0.9)
INR BLD: 1.08 RATIO — SIGNIFICANT CHANGE UP (ref 0.85–1.18)
LYMPHOCYTES # BLD AUTO: 2.13 K/UL — SIGNIFICANT CHANGE UP (ref 1–3.3)
LYMPHOCYTES # BLD AUTO: 40 % — SIGNIFICANT CHANGE UP (ref 13–44)
MAGNESIUM SERPL-MCNC: 2 MG/DL — SIGNIFICANT CHANGE UP (ref 1.6–2.6)
MCHC RBC-ENTMCNC: 23.2 PG — LOW (ref 27–34)
MCHC RBC-ENTMCNC: 31.4 GM/DL — LOW (ref 32–36)
MCV RBC AUTO: 73.7 FL — LOW (ref 80–100)
MONOCYTES # BLD AUTO: 0.5 K/UL — SIGNIFICANT CHANGE UP (ref 0–0.9)
MONOCYTES NFR BLD AUTO: 9.4 % — SIGNIFICANT CHANGE UP (ref 2–14)
NEUTROPHILS # BLD AUTO: 2.23 K/UL — SIGNIFICANT CHANGE UP (ref 1.8–7.4)
NEUTROPHILS NFR BLD AUTO: 42 % — LOW (ref 43–77)
NRBC # BLD: 0 /100 WBCS — SIGNIFICANT CHANGE UP (ref 0–0)
NRBC # FLD: 0 K/UL — SIGNIFICANT CHANGE UP (ref 0–0)
NT-PROBNP SERPL-SCNC: 1608 PG/ML — HIGH
PHOSPHATE SERPL-MCNC: 3.3 MG/DL — SIGNIFICANT CHANGE UP (ref 2.5–4.5)
PLATELET # BLD AUTO: 224 K/UL — SIGNIFICANT CHANGE UP (ref 150–400)
POTASSIUM SERPL-MCNC: 4.5 MMOL/L — SIGNIFICANT CHANGE UP (ref 3.5–5.3)
POTASSIUM SERPL-SCNC: 4.5 MMOL/L — SIGNIFICANT CHANGE UP (ref 3.5–5.3)
PROT SERPL-MCNC: 8.1 G/DL — SIGNIFICANT CHANGE UP (ref 6–8.3)
PROTHROM AB SERPL-ACNC: 12.2 SEC — SIGNIFICANT CHANGE UP (ref 9.5–13)
RBC # BLD: 5.48 M/UL — HIGH (ref 3.8–5.2)
RBC # FLD: 16.9 % — HIGH (ref 10.3–14.5)
SODIUM SERPL-SCNC: 137 MMOL/L — SIGNIFICANT CHANGE UP (ref 135–145)
TROPONIN T, HIGH SENSITIVITY RESULT: 66 NG/L — CRITICAL HIGH
TROPONIN T, HIGH SENSITIVITY RESULT: 68 NG/L — CRITICAL HIGH
TSH SERPL-MCNC: 0.86 UIU/ML — SIGNIFICANT CHANGE UP (ref 0.27–4.2)
WBC # BLD: 5.32 K/UL — SIGNIFICANT CHANGE UP (ref 3.8–10.5)
WBC # FLD AUTO: 5.32 K/UL — SIGNIFICANT CHANGE UP (ref 3.8–10.5)

## 2024-02-19 PROCEDURE — 99285 EMERGENCY DEPT VISIT HI MDM: CPT

## 2024-02-19 PROCEDURE — 71045 X-RAY EXAM CHEST 1 VIEW: CPT | Mod: 26

## 2024-02-19 RX ORDER — ASPIRIN/CALCIUM CARB/MAGNESIUM 324 MG
324 TABLET ORAL ONCE
Refills: 0 | Status: COMPLETED | OUTPATIENT
Start: 2024-02-19 | End: 2024-02-19

## 2024-02-19 RX ADMIN — Medication 324 MILLIGRAM(S): at 19:58

## 2024-02-19 NOTE — H&P ADULT - HISTORY OF PRESENT ILLNESS
83 year old female, with past history significant for HTN, CHF, Hypothyroidism, OA, Obesity and Type-2 DM, presented to the ED secondary to left sided chest pain.  Seen and evaluated at bedside;    Vital signs upon ED presentation as follows: BP = 126/76, HR = 93, RR = 16, T = 36.4 C (97.6 F), O2 Sat = 100% on RA.  Diagnosed with Chest Pain and prescribed aspirin 324 mg in the ED. 83 year old female, with past history significant for HTN, CHF, Hypothyroidism, OA, Obesity and Type-2 DM, presented to the ED secondary to left sided chest pain.  Seen and evaluated at bedside; NAD.  Patient wanting to go home because of being uncomfortable in the hallway.  Discussed with patient the need for full evaluation and patient agrees to facilitate further evaluation.  Patient reports onset of sharp, left sided chest pain while lying in bed; pain lasted for approximately 10 minutes and was associated with shortness of breath and pain radiating to the left neck and left arm.  No palpitations.  No nausea or vomiting.  Had some blurring of the vision.  No prior episodes.  Patient went to her PCP and was sent to the ED.    Vital signs upon ED presentation as follows: BP = 126/76, HR = 93, RR = 16, T = 36.4 C (97.6 F), O2 Sat = 100% on RA.  Diagnosed with Chest Pain and prescribed aspirin 324 mg in the ED.

## 2024-02-19 NOTE — ED PROVIDER NOTE - CLINICAL SUMMARY MEDICAL DECISION MAKING FREE TEXT BOX
83-year-old female history HTN, HLD, DM, hypothyroid, OA, presents to the ED with chest pain.  Patient 3 days ago had a episode of nonexertional chest pain while laying in her bed that was left-sided, sharp, lasting approximately less than 10 minutes.  Today patient had an additional nonexertional episode of chest pain while laying in bed again left-sided sharp however this time radiating down the left arm again resolving prior to ED lasting approximately less than 10 minutes.  Patient denies any associated symptoms of shortness of breath, fever/chills, cough, back pain, Twan pain, N/V/D, dysuria.,  No previous episodes of chest pain, EKG without ST elevation depression or T wave version, patient well-appearing physical exam within normal limits.  Given patient respecters patient high risk chest pain no previous cardiac workup, rule out ACS, less likely URI/PNA versus pleuritis.  Will get labs, EKG, CXR, plus minus pharmacological intervention and likely admit given high risk chest pain.

## 2024-02-19 NOTE — ED ADULT NURSE NOTE - NSFALLUNIVINTERV_ED_ALL_ED
Bed/Stretcher in lowest position, wheels locked, appropriate side rails in place/Call bell, personal items and telephone in reach/Instruct patient to call for assistance before getting out of bed/chair/stretcher/Non-slip footwear applied when patient is off stretcher/[place] to call system/Physically safe environment - no spills, clutter or unnecessary equipment/Purposeful proactive rounding/Room/bathroom lighting operational, light cord in reach

## 2024-02-19 NOTE — ED PROVIDER NOTE - PROGRESS NOTE DETAILS
troponin 68, given asa 324. high risk chest pain. no active cp. tba to tele doc (Dr. Browne) who accepted patient. Pt in agreement with plan.

## 2024-02-19 NOTE — H&P ADULT - NSHPSOCIALHISTORY_GEN_ALL_CORE
SOCIAL HISTORY:    Marital Status:  (  )    (  ) Single        (  )        (  )        (  )   Lives with:          (  ) Alone      (  ) Spouse     (  ) Children         (  ) Parents             (  ) Other    No history of smoking  No history of alcohol abuse  No history of illegal drug use    Occupation: SOCIAL HISTORY:    Marital Status:  (  )    (  ) Single        (  )        (  )        ( x )   Lives with:          (  ) Alone      (  ) Spouse     (  ) Children         (  ) Parents             ( x ) Other/Foster son    No history of smoking  No history of alcohol abuse  No history of illegal drug use    Occupation:

## 2024-02-19 NOTE — ED PROVIDER NOTE - ATTENDING CONTRIBUTION TO CARE
84yo ho htn, hld, dm pw epsiodes of ches pain. first episode was 3 days ago at rest, sharp laeft sided last about a minute. this morning had a second episode of left sided chest pain that radiated to her neck and jaw, felt a little lightheaded, no sob, no nausea, vomiting or diaphoresis, still feels something unright in her chest. took 1 asa earlier today  ekg with sinus rhythm 1st degree block will check labs, trop ro acs

## 2024-02-19 NOTE — H&P ADULT - PROBLEM SELECTOR PLAN 3
- glucose = 105 on CMP  - on oral antihyperglycemics, but patient unable to provide list presently  - m-ISS, per FS  - consistent carb diet   - f/u A1c level in the AM

## 2024-02-19 NOTE — ED PROVIDER NOTE - PHYSICAL EXAMINATION
Refill request received for     Disp Refills Start End    tamsulosin (FLOMAX) 0.4 MG Cap 180 capsule 4 4/3/2019     Sig - Route: Take 1 capsule by mouth daily after a meal. - Oral      The pt was last seen on 3/20/2019. There is a future appointment scheduled for 5/8/2019. Medication last prescribed by Chantell Miller NP from [facility], Is it ok to refill?   GENERAL: well appearing in no acute distress, non-toxic appearing  HEENT: oral mucosa moist, no JVD  CARDIAC: regular rate and rhythm, normal S1S2  PULM: normal breath sounds, clear to ascultation bilaterally, no rales, rhonchi, wheezing  GI: Abd soft, nondistended, nontender, no rebound tenderness, no guarding, no rigidity  NEURO: normal speech, AAOx3  MSK: no peripheral edema, no calf tenderness b/l  SKIN: well-perfused, extremities warm, no visible rashes

## 2024-02-19 NOTE — H&P ADULT - PROBLEM SELECTOR PLAN 8
- Heparin SQ Q12H Discussed with patient current diagnoses and plan of care/treatment.    Patient names niece, Germaine Alexis, as her health care proxy (HCP), and provides contact number = [phone].  States that the niece has a signed HCP form and also that the niece is aware of her wishes.  Patient is Full Code.

## 2024-02-19 NOTE — H&P ADULT - CONVERSATION DETAILS
Discussed with patient current diagnoses and plan of care/treatment.  Patient initially wanted to go home, but after discussion, understands the benefits versus the risks of leaving against advice.  Patient names niece, Germaine Alexis, as her health care proxy (HCP), and provides contact number = [phone].  States that the niece has a signed HCP form and also that the niece is aware of her wishes.  Patient is Full Code.

## 2024-02-19 NOTE — ED PROVIDER NOTE - NSICDXPASTMEDICALHX_GEN_ALL_CORE_FT
PAST MEDICAL HISTORY:  Carpal tunnel syndrome bilateral    Essential hypertension     History of congestive heart disease as per patient she had chf diagnosed in 2017    Hypothyroidism     OA (osteoarthritis)     Obesity, unspecified obesity severity, unspecified obesity type     Type 2 diabetes mellitus with complication, without long-term current use of insulin

## 2024-02-19 NOTE — H&P ADULT - PROBLEM SELECTOR PLAN 7
- patient unable to provide medication list at present  - will e-mail Med-Hx Pharmacist; please f/u and complete Med-Rec

## 2024-02-19 NOTE — H&P ADULT - ASSESSMENT
[ x ]  Lab studies personally reviewed  [ x ]  Radiology personally reviewed  [ x ]  Old records personally reviewed    83 year old female, with past history significant for HTN, CHF, Hypothyroidism, OA, Obesity and Type-2 DM, presented to the ED secondary to left sided chest pain.  Diagnosed with Chest Pain in the ED.

## 2024-02-19 NOTE — ED ADULT NURSE NOTE - OBJECTIVE STATEMENT
Pt is alert and orientedx4, ambulatory at baseline with assistance. Pt presents to the ED with 3 days of intermittent radiating  chest pain. Pt has Phx of CHF, type 2DM and HTN. Pt radiated down left arm, pt was told to come to ED by PCP fro evaluation. Pt denies shortness of breath, dyspnea on exertion, breathing is unlabored and even. Pt denies nausea, vomiting or diarrhea. 20G IV placed in LAC labs drawn, awaiting further orders. Call bell within reach, bed in lowest position, will continue to monitor.

## 2024-02-19 NOTE — ED PROVIDER NOTE - NSICDXPASTSURGICALHX_GEN_ALL_CORE_FT
PAST SURGICAL HISTORY:  Cyst of breast, unspecified laterality bilateral excisions    H/O: hysterectomy     History of total left hip replacement 2017, RIGHT HIP

## 2024-02-19 NOTE — H&P ADULT - PROBLEM SELECTOR PLAN 1
- sharp, associated with blurred vision, shortness of breath and pain radiating to the left neck and arm  - history of HTN, CHF, DM  - troponin = 68 --> 66, pro-BNP = 1608.  TSH = 0.86  - ECG = sinus rhythm with marked sinus arrhythmia with 1st degree AVB at 66 bpm, QTc = 448.  CXR w/o acute changes  - current PTA medications unknown; f/u Med-Hx Pharmacist are medication list and complete Med-Rec  - s/p aspirin 324 mg in the ED; continuing w/ asprin 81 mg daily  - starting metoprolol 12.5 mg Q12H, atorvastatin 40 mg  - f/u TTE w/ Bubble Study (ordered)  - continues on Telemetry  - Cardiologist resuming care in the AM

## 2024-02-19 NOTE — H&P ADULT - PROBLEM SELECTOR PLAN 2
- per history; unknown if systolic or diastolic  - report of intermittent edema of the legs  - presented w/ complaint of chest pain, shortness of breath, L-arm numbness/pain...(see above)  - pro-BNP = 1608.  Trop = 68 --> 66.  TSH = 0.86  - unclear if on diuretic; patient endorses urinating frequently, but not sure if she is on diuretic  - intake/output Q4H, weight daily, HOB elevation.  Fluid restriction of 1.5 liters for now (patient endorses drinking a good amount of water at home)  - f/u TTE (ordered)  - f/u AM lab-work  - Cardiologist resuming care in the AM

## 2024-02-19 NOTE — H&P ADULT - NSHPREVIEWOFSYSTEMS_GEN_ALL_CORE
REVIEW OF SYSTEMS:    CONSTITUTIONAL: No weakness, fever, chills or sweating  EYES/ENT: Some blurring of the vision at the time of chest pain.  No dysphagia  NECK: No pain or stiffness  RESPIRATORY: No cough or hemoptysis.  (+) shortness of breath at the time of L-chest pain  CARDIOVASCULAR: L-sided, sharp chest pain w/ radiation to the left neck and left arm.  Occasional edema of the lower extremities (L > F)  GASTROINTESTINAL: No epigastric or abdominal pain. No nausea, vomiting or hematemesis.  No diarrhea or constipation. No melena or hematochezia.  GENITOURINARY: No dysuria, frequency or hematuria  MUSCULOSKELETAL: Periodic edema of the legs (L > R).  No joint pain, decreased ROM, erythema, warmth  NEUROLOGICAL: No numbness or weakness  PSYCHIATRY: No anxiety, or depression.  SKIN: No itching, burning, rashes, or lesions   All other review of systems is negative unless indicated above.

## 2024-02-19 NOTE — H&P ADULT - NSHPLABSRESULTS_GEN_ALL_CORE
12.7   5.32  )-----------( 224      ( 19 Feb 2024 18:30 )             40.4     137  |  98  |  22  ----------------------------<  105<H>     02-19  4.5   |  28  |  1.16    Ca    10.0      19 Feb 2024 18:30  Phos  3.3     02-19  Mg     2.00     02-19    TPro  8.1  /  Alb  4.2  /  TBili  0.8  /  DBili  x   /  AST  36<H>  /  ALT  36<H>  /  AlkPhos  90  02-19    PT/INR: 12.2/1.08 (02-19-24 @ 20:30)  PTT: 39.8 (02-19-24 @ 20:30)    hs Troponin, T - 66 ng/L (02-19-24 @ 20:30)  hs Troponin, T - 68 ng/L (02-19-24 @ 18:30)    ============================================================================        ============================================================================  . 12.7   5.32  )-----------( 224      ( 19 Feb 2024 18:30 )             40.4     137  |  98  |  22  ----------------------------<  105<H>     02-19  4.5   |  28  |  1.16    Ca    10.0      19 Feb 2024 18:30  Phos  3.3     02-19  Mg     2.00     02-19    TPro  8.1  /  Alb  4.2  /  TBili  0.8  /  DBili  x   /  AST  36<H>  /  ALT  36<H>  /  AlkPhos  90  02-19    PT/INR: 12.2/1.08 (02-19-24 @ 20:30)  PTT: 39.8 (02-19-24 @ 20:30)    hs Troponin, T - 66 ng/L (02-19-24 @ 20:30)  hs Troponin, T - 68 ng/L (02-19-24 @ 18:30)    ==============================================================================    ECG = sinus rhythm with marked sinus arrhythmia with 1st degree AVB at 66 bpm, QTc = 448    ==============================================================================  .

## 2024-02-19 NOTE — ED ADULT TRIAGE NOTE - CHIEF COMPLAINT QUOTE
pt. presents from her PCP office for c/o chest pain x3 days. pt. endorses chest pain as sharp, radiating down her left arm, denies radiation to the jaw or back. pt. appears comfortable. Endorses past medical Hx of DM on metformin, Htn. pt. presents from her PCP office for c/o chest pain x3 days. pt. endorses chest pain as sharp, radiating down her left arm, denies radiation to the jaw or back. pt. appears comfortable. Endorses past medical Hx of DM on metformin, Htn.  in triage.

## 2024-02-19 NOTE — H&P ADULT - NSICDXPASTSURGICALHX_GEN_ALL_CORE_FT
PAST SURGICAL HISTORY:  Cyst of breast, unspecified laterality bilateral excisions    H/O: hysterectomy     History of total left hip replacement 2017, RIGHT HIP     PAST SURGICAL HISTORY:  Cyst of breast, unspecified laterality bilateral excisions    H/O left breast biopsy     H/O left knee surgery     H/O: hysterectomy     History of total left hip replacement 2017, RIGHT HIP

## 2024-02-19 NOTE — H&P ADULT - NSICDXFAMILYHX_GEN_ALL_CORE_FT
FAMILY HISTORY:  Sibling  Still living? No  Family history of colon cancer, Age at diagnosis: Age Unknown  Family history of heart disease, Age at diagnosis: Age Unknown

## 2024-02-19 NOTE — ED ADULT NURSE NOTE - CHIEF COMPLAINT QUOTE
pt. presents from her PCP office for c/o chest pain x3 days. pt. endorses chest pain as sharp, radiating down her left arm, denies radiation to the jaw or back. pt. appears comfortable. Endorses past medical Hx of DM on metformin, Htn.  in triage.

## 2024-02-19 NOTE — ED ADULT NURSE REASSESSMENT NOTE - NS ED NURSE REASSESS COMMENT FT1
report given to essu2 rn. pt moved to [facility]
Report received from day shift RN. Pt A&Ox4, resting in stretcher. Pt denies chest pain, sob, abd pain, ha, dizziness, n/v/d, fevers/chills at this time. Respirations even and unlabored, no accessory muscle use. IV intact. Stretcher in lowest position, side rail up, call bell within reach. Pt on cardiac monitor.

## 2024-02-19 NOTE — H&P ADULT - NSHPPHYSICALEXAM_GEN_ALL_CORE
Vital Signs Last 24 Hrs  T(C): 36.6 (19 Feb 2024 21:04), Max: 36.6 (19 Feb 2024 15:16)  T(F): 97.9 (19 Feb 2024 21:04), Max: 97.9 (19 Feb 2024 21:04)  HR: 62 (19 Feb 2024 22:30) (60 - 93)  BP: 128/76 (19 Feb 2024 22:30) (122/71 - 133/75)  BP(mean): --  RR: 17 (19 Feb 2024 22:30) (16 - 18)  SpO2: 100% (19 Feb 2024 22:30) (97% - 100%)    Parameters below as of 19 Feb 2024 22:30  Patient On (Oxygen Delivery Method): room air  =================================================== Vital Signs Last 24 Hrs  T(C): 36.6 (19 Feb 2024 21:04), Max: 36.6 (19 Feb 2024 15:16)  T(F): 97.9 (19 Feb 2024 21:04), Max: 97.9 (19 Feb 2024 21:04)  HR: 62 (19 Feb 2024 22:30) (60 - 93)  BP: 128/76 (19 Feb 2024 22:30) (122/71 - 133/75)  BP(mean): --  RR: 17 (19 Feb 2024 22:30) (16 - 18)  SpO2: 100% (19 Feb 2024 22:30) (97% - 100%)    Parameters below as of 19 Feb 2024 22:30  Patient On (Oxygen Delivery Method): room air  ===================================================    PHYSICAL EXAMINATION:    APPEARANCE: Adequately groomed, adequately nourished female,  appearing younger than stated age, lying propped up in stretcher in NAD  HEENT: Dry appearing oral mucosa.  Pupils reactive to light.  EOMI  LYMPHATIC: No lymphadenopathy appreciated  CARDIOVASCULAR: (+) S1 S2.  No JVD.  No gross murmurs appreciated.  No edema  RESPIRATORY: No wheezing, rhonchi, crackles appreciated  GASTROINTESTINAL: Soft.  Non-tender.  (+) BS  GENITOURINARY: No suprapubic tenderness.  No CVA tenderness B/L  EXTREMITIES: Normal range of motion.  No clubbing, cyanosis or edema  MUSCULOSKELETAL: No atrophy.  No asymmetry.  Good ROM  SKIN: No rashes. No ecchymoses.  No cyanosis  PSYCHIATRIC: A&O x 3.  Mood & affect appropriate to situation  NEUROLOGICAL: Non-focal, CURRY x 4 against gravity  VASCULAR: Peripheral pulses palpable

## 2024-02-19 NOTE — H&P ADULT - PROBLEM SELECTOR PLAN 4
- no acute issues presently  - unclear if on medications PTA  - starting metoprolol 12.5 mg Q12H for now  - if renal function stable, would start low dose ACEi or ARB  - f/u Med-Hx Pharmacist and complete Med-Rec

## 2024-02-20 DIAGNOSIS — E03.9 HYPOTHYROIDISM, UNSPECIFIED: ICD-10-CM

## 2024-02-20 DIAGNOSIS — Z29.9 ENCOUNTER FOR PROPHYLACTIC MEASURES, UNSPECIFIED: ICD-10-CM

## 2024-02-20 DIAGNOSIS — Z79.899 OTHER LONG TERM (CURRENT) DRUG THERAPY: ICD-10-CM

## 2024-02-20 DIAGNOSIS — R07.89 OTHER CHEST PAIN: ICD-10-CM

## 2024-02-20 DIAGNOSIS — I10 ESSENTIAL (PRIMARY) HYPERTENSION: ICD-10-CM

## 2024-02-20 DIAGNOSIS — Z98.890 OTHER SPECIFIED POSTPROCEDURAL STATES: Chronic | ICD-10-CM

## 2024-02-20 DIAGNOSIS — I50.9 HEART FAILURE, UNSPECIFIED: ICD-10-CM

## 2024-02-20 DIAGNOSIS — M19.90 UNSPECIFIED OSTEOARTHRITIS, UNSPECIFIED SITE: ICD-10-CM

## 2024-02-20 DIAGNOSIS — E11.9 TYPE 2 DIABETES MELLITUS WITHOUT COMPLICATIONS: ICD-10-CM

## 2024-02-20 DIAGNOSIS — Z71.89 OTHER SPECIFIED COUNSELING: ICD-10-CM

## 2024-02-20 LAB
24R-OH-CALCIDIOL SERPL-MCNC: 34.1 NG/ML — SIGNIFICANT CHANGE UP (ref 30–80)
A1C WITH ESTIMATED AVERAGE GLUCOSE RESULT: 6.3 % — HIGH (ref 4–5.6)
ALBUMIN SERPL ELPH-MCNC: 4 G/DL — SIGNIFICANT CHANGE UP (ref 3.3–5)
ALP SERPL-CCNC: 84 U/L — SIGNIFICANT CHANGE UP (ref 40–120)
ALT FLD-CCNC: 31 U/L — SIGNIFICANT CHANGE UP (ref 4–33)
ANION GAP SERPL CALC-SCNC: 11 MMOL/L — SIGNIFICANT CHANGE UP (ref 7–14)
AST SERPL-CCNC: 31 U/L — SIGNIFICANT CHANGE UP (ref 4–32)
BASOPHILS # BLD AUTO: 0.03 K/UL — SIGNIFICANT CHANGE UP (ref 0–0.2)
BASOPHILS NFR BLD AUTO: 0.6 % — SIGNIFICANT CHANGE UP (ref 0–2)
BILIRUB SERPL-MCNC: 0.8 MG/DL — SIGNIFICANT CHANGE UP (ref 0.2–1.2)
BUN SERPL-MCNC: 21 MG/DL — SIGNIFICANT CHANGE UP (ref 7–23)
CALCIUM SERPL-MCNC: 9.7 MG/DL — SIGNIFICANT CHANGE UP (ref 8.4–10.5)
CHLORIDE SERPL-SCNC: 98 MMOL/L — SIGNIFICANT CHANGE UP (ref 98–107)
CHOLEST SERPL-MCNC: 93 MG/DL — SIGNIFICANT CHANGE UP
CO2 SERPL-SCNC: 27 MMOL/L — SIGNIFICANT CHANGE UP (ref 22–31)
CREAT SERPL-MCNC: 1.12 MG/DL — SIGNIFICANT CHANGE UP (ref 0.5–1.3)
EGFR: 48 ML/MIN/1.73M2 — LOW
EOSINOPHIL # BLD AUTO: 0.35 K/UL — SIGNIFICANT CHANGE UP (ref 0–0.5)
EOSINOPHIL NFR BLD AUTO: 6.9 % — HIGH (ref 0–6)
ESTIMATED AVERAGE GLUCOSE: 134 — SIGNIFICANT CHANGE UP
GLUCOSE BLDC GLUCOMTR-MCNC: 118 MG/DL — HIGH (ref 70–99)
GLUCOSE BLDC GLUCOMTR-MCNC: 128 MG/DL — HIGH (ref 70–99)
GLUCOSE BLDC GLUCOMTR-MCNC: 134 MG/DL — HIGH (ref 70–99)
GLUCOSE BLDC GLUCOMTR-MCNC: 97 MG/DL — SIGNIFICANT CHANGE UP (ref 70–99)
GLUCOSE SERPL-MCNC: 100 MG/DL — HIGH (ref 70–99)
HCT VFR BLD CALC: 39.5 % — SIGNIFICANT CHANGE UP (ref 34.5–45)
HCYS SERPL-MCNC: 15.9 UMOL/L — HIGH
HDLC SERPL-MCNC: 36 MG/DL — LOW
HGB BLD-MCNC: 11.9 G/DL — SIGNIFICANT CHANGE UP (ref 11.5–15.5)
IANC: 2.29 K/UL — SIGNIFICANT CHANGE UP (ref 1.8–7.4)
IMM GRANULOCYTES NFR BLD AUTO: 0 % — SIGNIFICANT CHANGE UP (ref 0–0.9)
LIPID PNL WITH DIRECT LDL SERPL: 45 MG/DL — SIGNIFICANT CHANGE UP
LYMPHOCYTES # BLD AUTO: 1.92 K/UL — SIGNIFICANT CHANGE UP (ref 1–3.3)
LYMPHOCYTES # BLD AUTO: 38 % — SIGNIFICANT CHANGE UP (ref 13–44)
MAGNESIUM SERPL-MCNC: 2 MG/DL — SIGNIFICANT CHANGE UP (ref 1.6–2.6)
MCHC RBC-ENTMCNC: 22.5 PG — LOW (ref 27–34)
MCHC RBC-ENTMCNC: 30.1 GM/DL — LOW (ref 32–36)
MCV RBC AUTO: 74.5 FL — LOW (ref 80–100)
MONOCYTES # BLD AUTO: 0.46 K/UL — SIGNIFICANT CHANGE UP (ref 0–0.9)
MONOCYTES NFR BLD AUTO: 9.1 % — SIGNIFICANT CHANGE UP (ref 2–14)
NEUTROPHILS # BLD AUTO: 2.29 K/UL — SIGNIFICANT CHANGE UP (ref 1.8–7.4)
NEUTROPHILS NFR BLD AUTO: 45.4 % — SIGNIFICANT CHANGE UP (ref 43–77)
NON HDL CHOLESTEROL: 57 MG/DL — SIGNIFICANT CHANGE UP
NRBC # BLD: 0 /100 WBCS — SIGNIFICANT CHANGE UP (ref 0–0)
NRBC # FLD: 0 K/UL — SIGNIFICANT CHANGE UP (ref 0–0)
PHOSPHATE SERPL-MCNC: 3.1 MG/DL — SIGNIFICANT CHANGE UP (ref 2.5–4.5)
PLATELET # BLD AUTO: 187 K/UL — SIGNIFICANT CHANGE UP (ref 150–400)
POTASSIUM SERPL-MCNC: 4.2 MMOL/L — SIGNIFICANT CHANGE UP (ref 3.5–5.3)
POTASSIUM SERPL-SCNC: 4.2 MMOL/L — SIGNIFICANT CHANGE UP (ref 3.5–5.3)
PROT SERPL-MCNC: 7.5 G/DL — SIGNIFICANT CHANGE UP (ref 6–8.3)
RBC # BLD: 5.3 M/UL — HIGH (ref 3.8–5.2)
RBC # FLD: 17.2 % — HIGH (ref 10.3–14.5)
SODIUM SERPL-SCNC: 136 MMOL/L — SIGNIFICANT CHANGE UP (ref 135–145)
TRIGL SERPL-MCNC: 60 MG/DL — SIGNIFICANT CHANGE UP
TROPONIN T, HIGH SENSITIVITY RESULT: 61 NG/L — CRITICAL HIGH
TROPONIN T, HIGH SENSITIVITY RESULT: 65 NG/L — CRITICAL HIGH
WBC # BLD: 5.05 K/UL — SIGNIFICANT CHANGE UP (ref 3.8–10.5)
WBC # FLD AUTO: 5.05 K/UL — SIGNIFICANT CHANGE UP (ref 3.8–10.5)

## 2024-02-20 PROCEDURE — 99223 1ST HOSP IP/OBS HIGH 75: CPT

## 2024-02-20 PROCEDURE — 99497 ADVNCD CARE PLAN 30 MIN: CPT | Mod: 25

## 2024-02-20 RX ORDER — HEPARIN SODIUM 5000 [USP'U]/ML
5000 INJECTION INTRAVENOUS; SUBCUTANEOUS EVERY 12 HOURS
Refills: 0 | Status: DISCONTINUED | OUTPATIENT
Start: 2024-02-20 | End: 2024-02-22

## 2024-02-20 RX ORDER — ACETAMINOPHEN 500 MG
650 TABLET ORAL EVERY 6 HOURS
Refills: 0 | Status: DISCONTINUED | OUTPATIENT
Start: 2024-02-20 | End: 2024-02-21

## 2024-02-20 RX ORDER — ATORVASTATIN CALCIUM 80 MG/1
40 TABLET, FILM COATED ORAL AT BEDTIME
Refills: 0 | Status: DISCONTINUED | OUTPATIENT
Start: 2024-02-20 | End: 2024-02-21

## 2024-02-20 RX ORDER — DEXTROSE 50 % IN WATER 50 %
25 SYRINGE (ML) INTRAVENOUS ONCE
Refills: 0 | Status: DISCONTINUED | OUTPATIENT
Start: 2024-02-20 | End: 2024-02-22

## 2024-02-20 RX ORDER — LINAGLIPTIN AND METFORMIN HYDROCHLORIDE 2.5; 85 MG/1; MG/1
1 TABLET, FILM COATED ORAL
Qty: 0 | Refills: 0 | DISCHARGE

## 2024-02-20 RX ORDER — LABETALOL HCL 100 MG
1 TABLET ORAL
Qty: 0 | Refills: 0 | DISCHARGE

## 2024-02-20 RX ORDER — GLUCAGON INJECTION, SOLUTION 0.5 MG/.1ML
1 INJECTION, SOLUTION SUBCUTANEOUS ONCE
Refills: 0 | Status: DISCONTINUED | OUTPATIENT
Start: 2024-02-20 | End: 2024-02-22

## 2024-02-20 RX ORDER — LOSARTAN POTASSIUM 100 MG/1
1 TABLET, FILM COATED ORAL
Qty: 0 | Refills: 0 | DISCHARGE

## 2024-02-20 RX ORDER — SODIUM CHLORIDE 9 MG/ML
1000 INJECTION, SOLUTION INTRAVENOUS
Refills: 0 | Status: DISCONTINUED | OUTPATIENT
Start: 2024-02-20 | End: 2024-02-22

## 2024-02-20 RX ORDER — ASPIRIN/CALCIUM CARB/MAGNESIUM 324 MG
1 TABLET ORAL
Qty: 0 | Refills: 0 | DISCHARGE

## 2024-02-20 RX ORDER — DEXTROSE 50 % IN WATER 50 %
12.5 SYRINGE (ML) INTRAVENOUS ONCE
Refills: 0 | Status: DISCONTINUED | OUTPATIENT
Start: 2024-02-20 | End: 2024-02-22

## 2024-02-20 RX ORDER — METOPROLOL TARTRATE 50 MG
12.5 TABLET ORAL EVERY 12 HOURS
Refills: 0 | Status: DISCONTINUED | OUTPATIENT
Start: 2024-02-20 | End: 2024-02-22

## 2024-02-20 RX ORDER — FUROSEMIDE 40 MG
1 TABLET ORAL
Qty: 0 | Refills: 0 | DISCHARGE

## 2024-02-20 RX ORDER — NIFEDIPINE 30 MG
1 TABLET, EXTENDED RELEASE 24 HR ORAL
Refills: 0 | DISCHARGE

## 2024-02-20 RX ORDER — DONEPEZIL HYDROCHLORIDE 10 MG/1
1 TABLET, FILM COATED ORAL
Refills: 0 | DISCHARGE

## 2024-02-20 RX ORDER — BUMETANIDE 0.25 MG/ML
1 INJECTION INTRAMUSCULAR; INTRAVENOUS
Refills: 0 | DISCHARGE

## 2024-02-20 RX ORDER — METFORMIN HYDROCHLORIDE 850 MG/1
1 TABLET ORAL
Refills: 0 | DISCHARGE

## 2024-02-20 RX ORDER — INSULIN LISPRO 100/ML
VIAL (ML) SUBCUTANEOUS
Refills: 0 | Status: DISCONTINUED | OUTPATIENT
Start: 2024-02-20 | End: 2024-02-22

## 2024-02-20 RX ORDER — ASPIRIN/CALCIUM CARB/MAGNESIUM 324 MG
81 TABLET ORAL DAILY
Refills: 0 | Status: DISCONTINUED | OUTPATIENT
Start: 2024-02-20 | End: 2024-02-22

## 2024-02-20 RX ORDER — INSULIN LISPRO 100/ML
VIAL (ML) SUBCUTANEOUS AT BEDTIME
Refills: 0 | Status: DISCONTINUED | OUTPATIENT
Start: 2024-02-20 | End: 2024-02-22

## 2024-02-20 RX ORDER — DAPAGLIFLOZIN 10 MG/1
1 TABLET, FILM COATED ORAL
Refills: 0 | DISCHARGE

## 2024-02-20 RX ORDER — SITAGLIPTIN 50 MG/1
1 TABLET, FILM COATED ORAL
Refills: 0 | DISCHARGE

## 2024-02-20 RX ORDER — GLIMEPIRIDE 1 MG
1 TABLET ORAL
Qty: 0 | Refills: 0 | DISCHARGE

## 2024-02-20 RX ORDER — LEVOTHYROXINE SODIUM 125 MCG
1 TABLET ORAL
Qty: 0 | Refills: 0 | DISCHARGE

## 2024-02-20 RX ORDER — DEXTROSE 50 % IN WATER 50 %
15 SYRINGE (ML) INTRAVENOUS ONCE
Refills: 0 | Status: DISCONTINUED | OUTPATIENT
Start: 2024-02-20 | End: 2024-02-22

## 2024-02-20 RX ADMIN — HEPARIN SODIUM 5000 UNIT(S): 5000 INJECTION INTRAVENOUS; SUBCUTANEOUS at 17:35

## 2024-02-20 RX ADMIN — ATORVASTATIN CALCIUM 40 MILLIGRAM(S): 80 TABLET, FILM COATED ORAL at 21:22

## 2024-02-20 RX ADMIN — Medication 12.5 MILLIGRAM(S): at 05:17

## 2024-02-20 RX ADMIN — Medication 12.5 MILLIGRAM(S): at 17:35

## 2024-02-20 RX ADMIN — Medication 81 MILLIGRAM(S): at 11:59

## 2024-02-20 RX ADMIN — HEPARIN SODIUM 5000 UNIT(S): 5000 INJECTION INTRAVENOUS; SUBCUTANEOUS at 05:18

## 2024-02-20 NOTE — PATIENT PROFILE ADULT - FALL HARM RISK - HARM RISK INTERVENTIONS

## 2024-02-20 NOTE — CONSULT NOTE ADULT - SUBJECTIVE AND OBJECTIVE BOX
[facility] [place]   MD JASPREET RICHARD MD MARIA SANTIAGO, NP        TEL:  OFFICE: [phone]  From 5pm-7am answering service [phone]    --- INITIAL RENAL CONSULT NOTE ---date of service 02-20-24 @ 17:47    HPI:  83 year old female, with past history of HTN, CHF, Hypothyroidism, OA, Obesity and Type-2 DM, presented to the ED secondary to left sided chest pain. Nephrology consulted for CKD stage 3a.      Vital signs upon ED presentation as follows: BP = 126/76, HR = 93, RR = 16, T = 36.4 C (97.6 F), O2 Sat = 100% on RA.  Diagnosed with Chest Pain and prescribed aspirin 324 mg in the ED. (19 Feb 2024 22:52)        Allergies:  No Known Allergies      PAST MEDICAL & SURGICAL HISTORY:  Obesity, unspecified obesity severity, unspecified obesity type      Essential hypertension      Type 2 diabetes mellitus with complication, without long-term current use of insulin      History of congestive heart disease  as per patient she had chf diagnosed in 2017      Carpal tunnel syndrome  bilateral      Hypothyroidism      OA (osteoarthritis)      H/O: hysterectomy      Cyst of breast, unspecified laterality  bilateral excisions      History of total left hip replacement  2017, RIGHT HIP      H/O left knee surgery      H/O left breast biopsy          Home Medications Reviewed    Hospital Medications:   MEDICATIONS  (STANDING):  aspirin enteric coated 81 milliGRAM(s) Oral daily  atorvastatin 40 milliGRAM(s) Oral at bedtime  dextrose 5%. 1000 milliLiter(s) (50 mL/Hr) IV Continuous <Continuous>  dextrose 5%. 1000 milliLiter(s) (100 mL/Hr) IV Continuous <Continuous>  dextrose 50% Injectable 25 Gram(s) IV Push once  dextrose 50% Injectable 25 Gram(s) IV Push once  dextrose 50% Injectable 12.5 Gram(s) IV Push once  glucagon  Injectable 1 milliGRAM(s) IntraMuscular once  heparin   Injectable 5000 Unit(s) SubCutaneous every 12 hours  insulin lispro (ADMELOG) corrective regimen sliding scale   SubCutaneous three times a day before meals  insulin lispro (ADMELOG) corrective regimen sliding scale   SubCutaneous at bedtime  metoprolol tartrate 12.5 milliGRAM(s) Oral every 12 hours      SOCIAL HISTORY:  Denies ETOh, Smoking,     FAMILY HISTORY:  Family history of colon cancer (Sibling)    Family history of heart disease (Sibling)        REVIEW OF SYSTEMS:  CONSTITUTIONAL: No weakness, fevers or chills  EYES/ENT: No visual changes;  No vertigo or throat pain   NECK: No pain or stiffness  RESPIRATORY: No cough, wheezing, hemoptysis; No shortness of breath  CARDIOVASCULAR: as per HPI   GASTROINTESTINAL: No abdominal or epigastric pain. No nausea, vomiting, or hematemesis; No diarrhea or constipation. No melena or hematochezia.  GENITOURINARY: No dysuria, frequency, foamy urine, urinary urgency, incontinence or hematuria  NEUROLOGICAL: No numbness or weakness  SKIN: No itching, burning, rashes, or lesions   VASCULAR: No bilateral lower extremity edema.   All other review of systems is negative unless indicated above.    VITALS:  T(F): 97.9 (02-20-24 @ 17:25), Max: 97.9 (02-19-24 @ 21:04)  HR: 72 (02-20-24 @ 17:25)  BP: 135/80 (02-20-24 @ 17:25)  RR: 18 (02-20-24 @ 17:25)  SpO2: 100% (02-20-24 @ 17:25)  Wt(kg): --        PHYSICAL EXAM:  General: NAD  HEENT: anicteric sclera, oropharynx clear, MMM  Neck: No JVD  Respiratory: CTAB, no wheezes, rales or rhonchi  Cardiovascular: S1, S2, RRR  Gastrointestinal: BS+, soft, NT/ND  Extremities: No cyanosis or clubbing. No peripheral edema  Neurological: A/O x 3, no focal deficits  Psychiatric: Normal mood, normal affect  : No CVA tenderness. No pedroza.   Skin: No rashes      LABS:  02-20    136  |  98  |  21  ----------------------------<  100<H>  4.2   |  27  |  1.12    Ca    9.7      20 Feb 2024 06:20  Phos  3.1     02-20  Mg     2.00     02-20    TPro  7.5  /  Alb  4.0  /  TBili  0.8  /  DBili      /  AST  31  /  ALT  31  /  AlkPhos  84  02-20    Creatinine Trend: 1.12 <--, 1.16 <--                        11.9   5.05  )-----------( 187      ( 20 Feb 2024 06:20 )             39.5     Urine Studies:  Urinalysis Basic - ( 20 Feb 2024 06:20 )    Color:  / Appearance:  / SG:  / pH:   Gluc: 100 mg/dL / Ketone:   / Bili:  / Urobili:    Blood:  / Protein:  / Nitrite:    Leuk Esterase:  / RBC:  / WBC    Sq Epi:  / Non Sq Epi:  / Bacteria:           RADIOLOGY & ADDITIONAL STUDIES:                 [facility] [place] [facility]   MD JASPREET RICHARD MD MARIA SANTIAGO, NP        TEL:  OFFICE: [phone]  From 5pm-7am answering service [phone]    --- INITIAL RENAL CONSULT NOTE ---date of service 02-20-24 @ 17:47    HPI:  83 year old female, with past history of HTN, CHF, Hypothyroidism, OA, Obesity and Type-2 DM, presented to the ED secondary to left sided chest pain. Nephrology consulted for renal failure.        Allergies:  No Known Allergies      PAST MEDICAL & SURGICAL HISTORY:  Obesity, unspecified obesity severity, unspecified obesity type      Essential hypertension      Type 2 diabetes mellitus with complication, without long-term current use of insulin      History of congestive heart disease  as per patient she had chf diagnosed in 2017      Carpal tunnel syndrome  bilateral      Hypothyroidism      OA (osteoarthritis)      H/O: hysterectomy      Cyst of breast, unspecified laterality  bilateral excisions      History of total left hip replacement  2017, RIGHT HIP      H/O left knee surgery      H/O left breast biopsy          Home Medications Reviewed    Hospital Medications:   MEDICATIONS  (STANDING):  aspirin enteric coated 81 milliGRAM(s) Oral daily  atorvastatin 40 milliGRAM(s) Oral at bedtime  dextrose 5%. 1000 milliLiter(s) (50 mL/Hr) IV Continuous <Continuous>  dextrose 5%. 1000 milliLiter(s) (100 mL/Hr) IV Continuous <Continuous>  dextrose 50% Injectable 25 Gram(s) IV Push once  dextrose 50% Injectable 25 Gram(s) IV Push once  dextrose 50% Injectable 12.5 Gram(s) IV Push once  glucagon  Injectable 1 milliGRAM(s) IntraMuscular once  heparin   Injectable 5000 Unit(s) SubCutaneous every 12 hours  insulin lispro (ADMELOG) corrective regimen sliding scale   SubCutaneous three times a day before meals  insulin lispro (ADMELOG) corrective regimen sliding scale   SubCutaneous at bedtime  metoprolol tartrate 12.5 milliGRAM(s) Oral every 12 hours      SOCIAL HISTORY:  Denies ETOh, Smoking,     FAMILY HISTORY:  Family history of colon cancer (Sibling)    Family history of heart disease (Sibling)        REVIEW OF SYSTEMS:  CONSTITUTIONAL: No weakness, fevers or chills  EYES/ENT: No visual changes;  No vertigo or throat pain   NECK: No pain or stiffness  RESPIRATORY: No cough, wheezing, hemoptysis; No shortness of breath  CARDIOVASCULAR: as per HPI   GASTROINTESTINAL: No abdominal or epigastric pain. No nausea, vomiting, or hematemesis; No diarrhea or constipation. No melena or hematochezia.  GENITOURINARY: No dysuria, frequency, foamy urine, urinary urgency, incontinence or hematuria  NEUROLOGICAL: No numbness or weakness  SKIN: No itching, burning, rashes, or lesions   VASCULAR: No bilateral lower extremity edema.   All other review of systems is negative unless indicated above.    VITALS:  T(F): 97.9 (02-20-24 @ 17:25), Max: 97.9 (02-19-24 @ 21:04)  HR: 72 (02-20-24 @ 17:25)  BP: 135/80 (02-20-24 @ 17:25)  RR: 18 (02-20-24 @ 17:25)  SpO2: 100% (02-20-24 @ 17:25)  Wt(kg): --        PHYSICAL EXAM:  General: NAD  HEENT: anicteric sclera, oropharynx clear, MMM  Neck: No JVD  Respiratory: CTAB, no wheezes, rales or rhonchi  Cardiovascular: S1, S2, RRR  Gastrointestinal: BS+, soft, NT/ND  Extremities: No cyanosis or clubbing. No peripheral edema  Neurological: A/O x 3, no focal deficits  Psychiatric: Normal mood, normal affect  : No CVA tenderness. No pedroza.   Skin: No rashes      LABS:  02-20    136  |  98  |  21  ----------------------------<  100<H>  4.2   |  27  |  1.12    Ca    9.7      20 Feb 2024 06:20  Phos  3.1     02-20  Mg     2.00     02-20    TPro  7.5  /  Alb  4.0  /  TBili  0.8  /  DBili      /  AST  31  /  ALT  31  /  AlkPhos  84  02-20    Creatinine Trend: 1.12 <--, 1.16 <--                        11.9   5.05  )-----------( 187      ( 20 Feb 2024 06:20 )             39.5     Urine Studies:  Urinalysis Basic - ( 20 Feb 2024 06:20 )    Color:  / Appearance:  / SG:  / pH:   Gluc: 100 mg/dL / Ketone:   / Bili:  / Urobili:    Blood:  / Protein:  / Nitrite:    Leuk Esterase:  / RBC:  / WBC    Sq Epi:  / Non Sq Epi:  / Bacteria:           RADIOLOGY & ADDITIONAL STUDIES:

## 2024-02-20 NOTE — PHARMACOTHERAPY INTERVENTION NOTE - COMMENTS
Medication history is complete. Medication list updated in Outpatient Medication Record (OMR). Please call spectra [phone] if you have any questions.

## 2024-02-20 NOTE — PHYSICAL THERAPY INITIAL EVALUATION ADULT - PERTINENT HX OF CURRENT PROBLEM, REHAB EVAL
83 year old female, with past history stated below, presented to the ED secondary to left sided chest pain. Diagnosed with Chest Pain in the ED.

## 2024-02-20 NOTE — PHYSICAL THERAPY INITIAL EVALUATION ADULT - PHYSICAL ASSIST/NONPHYSICAL ASSIST: GAIT, REHAB EVAL
GYN Visit     Ct Ponce is a 68 year old female who presents for a GYN visit for gyn exam   .   OB History    Para Term  AB Living   3 3 3 0 0 3   SAB IAB Ectopic Molar Multiple Live Births   0 0 0 0 0 3   Obstetric Comments    x 2 and  x 1 , face      Last seen at [facility]  , [facility] was menopausal      HPI: Patient states that she had a melanoma treated  under her chin. Primary wants vulva area checked   No VB      ROS: BM IBS  Urine scant with laugh leaking     Current Outpatient Medications   Medication Sig   • pravastatin (PRAVACHOL) 20 MG tablet Take 1 tablet by mouth every evening. At bedtime   • nitroGLYcerin topical (NITRO-BID) 2 % ointment Apply 0.5 inches topically every 4 to 6 hours as needed (chest pain).   • sumatriptan (IMITREX) 100 MG tablet Take 1 tablet by mouth as needed for Migraine. Take 1 tablet by mouth at onset of migraine. May repeat after 2 hours if needed.   • nebivolol (Bystolic) 10 MG tablet Take 1 tablet by mouth every evening.   • hydrochlorothiazide (HYDRODIURIL) 25 MG tablet Take 1 tablet by mouth daily.   • albuterol 108 (90 Base) MCG/ACT inhaler Inhale 2 puffs into the lungs every 4 hours as needed for Shortness of Breath or Wheezing.   • nitroGLYcerin (NITROSTAT) 0.3 MG sublingual tablet Place 1 tablet under the tongue as needed for Chest pain.   • omeprazole (PrilOSEC) 20 MG capsule TAKE 1 CAPSULE BY MOUTH DAILY   • cetirizine (ZYRTEC) 10 MG tablet Take 10 mg by mouth.   • Cholecalciferol (VITAMIN D) 2000 units tablet Take 2,000 Units by mouth.   • ibuprofen 200 MG capsule    • Probiotic Product (ALIGN) Cap    • aspirin (ECOTRIN) 81 MG EC tablet Take 1 tablet by mouth daily.       ALLERGIES:   Allergen Reactions   • Ampicillin ANAPHYLAXIS   • Azithromycin RASH   • Codeine RASH and SHORTNESS OF BREATH   • Penicillins ANAPHYLAXIS, RASH, SHORTNESS OF BREATH and SWELLING   • Tramadol RASH     Couldnt breath.       Past Medical History:   Diagnosis  Date   • Disorder of artery or arteriole (CMS/HCC) 2018   • Localized osteoporosis without current pathological fracture 2018   • Malignant melanoma of neck (CMS/HCC) 2021   • Mild intermittent asthma without complication 2018     Past Surgical History:   Procedure Laterality Date   •  delivery+postpartum care     • Knee scope,diagnostic     • Pr signs of melanoma present      wide excision     Family History   Problem Relation Age of Onset   • Cancer, Breast Mother         breast cancer diag at 74,dec at 83 2nd breast ca   • Coronary Artery Disease Father    • Myocardial Infarction Father 64   • Heart Sister         Irregular heartbeat   • Myocardial Infarction Maternal Grandmother 50          Physical Exam:   General: Alert and oriented x 3. No apparent distress. Well groomed. Pleasant.  Blood pressure 118/72, pulse 72, temperature 96.4 °F (35.8 °C), temperature source Temporal, resp. rate 16, height 5' 3\" (1.6 m), weight 87.5 kg (193 lb).    Breast exam: breasts symmetric , no dominant or suspicious mass, no skin or nipple changes , no nipple discharge , no axillary adenopathy and seb ker under breasts      Pelvic Exam:  Genitalia -   External Genitalia: Normal appearance and hair distribution.  Benign appearing pigmented Seb Ker noted   Urethral Meatus: Normal size and location, no lesions or prolapse.  Urethra: No masses, tenderness or scarring  Bladder: No fullness, masses or tenderness  Vagina: speculum exam  Normal general appearance, no discharge or lesions.  Adequate pelvic support.   Cervix: Normal appearance without lesions or discharge  Uterus: Normal size, contour, position, mobility, and support.  No tenderness  Adnexa:  No masses, tenderness, organomegaly or nodularity noted    Physical Exam  Genitourinary:              Assessment:   normal gyn exam   Hx melanoma      Plan:   F/u 1-2 years        Riana Robbins MD          supervision

## 2024-02-20 NOTE — CONSULT NOTE ADULT - SUBJECTIVE AND OBJECTIVE BOX
Cardiovascular Disease Initial Evaluation  Date of Service: 02-20-24 @ 10:32    CHIEF COMPLAINT: Chest pain    HISTORY OF PRESENT ILLNESS:    This is an 83 year old woman with HTN, Hypothyroidism, OA, Obesity and Type-2 DM who presented to [facility] on [facility]/19/2024 with left sided chest pain.   Shet reports onset of sharp, left sided chest pain while lying in bed; pain lasted for approximately 10 minutes and was associated with shortness of breath and pain radiating to the left neck and left arm.  No palpitations.  No nausea or vomiting.  Had some blurring of the vision.  No prior episodes.  Patient went to her PCP who referred her to the ED.      Allergies  No Known Allergies      	    MEDICATIONS:  aspirin enteric coated 81 milliGRAM(s) Oral daily  heparin   Injectable 5000 Unit(s) SubCutaneous every 12 hours  metoprolol tartrate 12.5 milliGRAM(s) Oral every 12 hours        acetaminophen     Tablet .. 650 milliGRAM(s) Oral every 6 hours PRN      atorvastatin 40 milliGRAM(s) Oral at bedtime  dextrose 50% Injectable 25 Gram(s) IV Push once  dextrose 50% Injectable 25 Gram(s) IV Push once  dextrose 50% Injectable 12.5 Gram(s) IV Push once  dextrose Oral Gel 15 Gram(s) Oral once PRN  glucagon  Injectable 1 milliGRAM(s) IntraMuscular once  insulin lispro (ADMELOG) corrective regimen sliding scale   SubCutaneous three times a day before meals  insulin lispro (ADMELOG) corrective regimen sliding scale   SubCutaneous at bedtime    dextrose 5%. 1000 milliLiter(s) IV Continuous <Continuous>  dextrose 5%. 1000 milliLiter(s) IV Continuous <Continuous>      PAST MEDICAL & SURGICAL HISTORY:  Obesity, unspecified obesity severity, unspecified obesity type      Essential hypertension      Type 2 diabetes mellitus with complication, without long-term current use of insulin      History of congestive heart disease  as per patient she had chf diagnosed in 2017      Carpal tunnel syndrome  bilateral      Hypothyroidism      OA (osteoarthritis)      H/O: hysterectomy      Cyst of breast, unspecified laterality  bilateral excisions      History of total left hip replacement  2017, RIGHT HIP      H/O left knee surgery      H/O left breast biopsy          FAMILY HISTORY:  Family history of colon cancer (Sibling)    Family history of heart disease (Sibling)        SOCIAL HISTORY:    The patient is a nonsmoker       REVIEW OF SYSTEMS:  See HPI, otherwise complete 14 point review of systems negative      PHYSICAL EXAM:  T(C): 36.3 (02-20-24 @ 10:20), Max: 36.6 (02-19-24 @ 15:16)  HR: 70 (02-20-24 @ 10:20) (60 - 93)  BP: 150/80 (02-20-24 @ 10:20) (122/71 - 150/80)  RR: 21 (02-20-24 @ 10:20) (16 - 21)  SpO2: 100% (02-20-24 @ 10:20) (97% - 100%)  Wt(kg): --  I&O's Summary      Appearance: No Acute Distress; resting comfortably  HEENT:  Normal oral mucosa, PERRL, EOMI	  Cardiovascular: Normal S1 S2, No JVD, No murmurs/rubs/gallops  Respiratory: Normal respiratory effort; Lungs clear to auscultation bilaterally  Gastrointestinal:  Soft, Non-tender, + BS	  Skin: No rashes, No ecchymoses, No cyanosis	  Neurologic: Non-focal; no weakness  Extremities: No clubbing, cyanosis or edema  Vascular: Peripheral pulses palpable 2+ bilaterally  Psychiatry: A & O x 3, Mood & affect appropriate    Laboratory Data:	 	    CBC Full  -  ( 20 Feb 2024 06:20 )  WBC Count : 5.05 K/uL  Hemoglobin : 11.9 g/dL  Hematocrit : 39.5 %  Platelet Count - Automated : 187 K/uL  Mean Cell Volume : 74.5 fL  Mean Cell Hemoglobin : 22.5 pg  Mean Cell Hemoglobin Concentration : 30.1 gm/dL  Auto Neutrophil # : 2.29 K/uL  Auto Lymphocyte # : 1.92 K/uL  Auto Monocyte # : 0.46 K/uL  Auto Eosinophil # : 0.35 K/uL  Auto Basophil # : 0.03 K/uL  Auto Neutrophil % : 45.4 %  Auto Lymphocyte % : 38.0 %  Auto Monocyte % : 9.1 %  Auto Eosinophil % : 6.9 %  Auto Basophil % : 0.6 %    02-20    136  |  98  |  21  ----------------------------<  100<H>  4.2   |  27  |  1.12  02-19    137  |  98  |  22  ----------------------------<  105<H>  4.5   |  28  |  1.16    Ca    9.7      20 Feb 2024 06:20  Ca    10.0      19 Feb 2024 18:30  Phos  3.1     02-20  Phos  3.3     02-19  Mg     2.00     02-20  Mg     2.00     02-19    TPro  7.5  /  Alb  4.0  /  TBili  0.8  /  DBili  x   /  AST  31  /  ALT  31  /  AlkPhos  84  02-20  TPro  8.1  /  Alb  4.2  /  TBili  0.8  /  DBili  x   /  AST  36<H>  /  ALT  36<H>  /  AlkPhos  90  02-19        Interpretation of Telemetry: Sinus	    ECG:  	Sinus; first degree AV block    Assessment: 83 year old woman with HTN, Hypothyroidism, OA, Obesity and Type-2 DM presents with chest pain.     Plan of Care:    #Chest pain-  Mrs. Melchor currently denies chest pain and her EKG is nonischemic.   Elevated troponins are likely secondary to CKD III status.  Awaiting echo prior to pursuing an ischemic work up.    #HTN-  BP acceptable in the ED.  Please clarify home med list.    #Compensated CHF-  Unknown EF.  Patient is not fluid overloaded.  Observe off diuresis.     PT evaluation ordered.       76 minutes spent on total encounter; more than 50% of the visit was spent counseling and/or coordinating care by the attending physician.   	  Boby Browne MD [facility]  Cardiovascular Diseases  [phone]

## 2024-02-20 NOTE — CONSULT NOTE ADULT - ASSESSMENT
HPI:   83 year old female, with past history of HTN, CHF, Hypothyroidism, OA, Obesity and Type-2 DM, presented to the ED secondary to left sided chest pain. Nephrology consulted for CKD stage 3a.    A/P   CKD 3a:  Cr at baseline   Monitor     HTN:   BP fluctuating   Resume home meds   Monitor BP           83 year old female, with past history of HTN, CHF, Hypothyroidism, OA, Obesity and Type-2 DM, presented to the ED secondary to left sided chest pain. Nephrology consulted for renal failure    A/P   CKD 3a:  SCr at baseline   Monitor at present    HTN:   BP fluctuating   continue current meds  Monitor BP     Chest pain:  work up per cardio

## 2024-02-20 NOTE — PHYSICAL THERAPY INITIAL EVALUATION ADULT - NSPTDISCHREC_GEN_A_CORE
Pt. not placed on skilled therapy services secondary to pt. performing at their baseline functional mobility performance. Discharge to home with no skilled PT services./No skilled PT needs

## 2024-02-20 NOTE — PHYSICAL THERAPY INITIAL EVALUATION ADULT - ADDITIONAL COMMENTS
Pt lives in an apartment with Grandson, +2 flights to negotiate, was independent with all functional mobility using a Single Axis Cane in the community, no device in the home. Pt was able to perform all ADLs independently prior to admission.     Pt left semi-supine on stretcher, all lines intact, all needs in reach, in NAD. RN aware. Heart rate 70 beats per minute.

## 2024-02-21 ENCOUNTER — RESULT REVIEW (OUTPATIENT)
Age: 84
End: 2024-02-21

## 2024-02-21 ENCOUNTER — TRANSCRIPTION ENCOUNTER (OUTPATIENT)
Age: 84
End: 2024-02-21

## 2024-02-21 LAB
ALBUMIN SERPL ELPH-MCNC: 3.8 G/DL — SIGNIFICANT CHANGE UP (ref 3.3–5)
ALP SERPL-CCNC: 82 U/L — SIGNIFICANT CHANGE UP (ref 40–120)
ALT FLD-CCNC: 44 U/L — HIGH (ref 4–33)
ANION GAP SERPL CALC-SCNC: 12 MMOL/L — SIGNIFICANT CHANGE UP (ref 7–14)
AST SERPL-CCNC: 45 U/L — HIGH (ref 4–32)
BASOPHILS # BLD AUTO: 0.05 K/UL — SIGNIFICANT CHANGE UP (ref 0–0.2)
BASOPHILS NFR BLD AUTO: 1 % — SIGNIFICANT CHANGE UP (ref 0–2)
BILIRUB SERPL-MCNC: 0.9 MG/DL — SIGNIFICANT CHANGE UP (ref 0.2–1.2)
BUN SERPL-MCNC: 24 MG/DL — HIGH (ref 7–23)
CALCIUM SERPL-MCNC: 9.4 MG/DL — SIGNIFICANT CHANGE UP (ref 8.4–10.5)
CHLORIDE SERPL-SCNC: 101 MMOL/L — SIGNIFICANT CHANGE UP (ref 98–107)
CO2 SERPL-SCNC: 25 MMOL/L — SIGNIFICANT CHANGE UP (ref 22–31)
CREAT SERPL-MCNC: 1.07 MG/DL — SIGNIFICANT CHANGE UP (ref 0.5–1.3)
EGFR: 51 ML/MIN/1.73M2 — LOW
EOSINOPHIL # BLD AUTO: 0.31 K/UL — SIGNIFICANT CHANGE UP (ref 0–0.5)
EOSINOPHIL NFR BLD AUTO: 5.9 % — SIGNIFICANT CHANGE UP (ref 0–6)
GLUCOSE BLDC GLUCOMTR-MCNC: 108 MG/DL — HIGH (ref 70–99)
GLUCOSE BLDC GLUCOMTR-MCNC: 109 MG/DL — HIGH (ref 70–99)
GLUCOSE BLDC GLUCOMTR-MCNC: 114 MG/DL — HIGH (ref 70–99)
GLUCOSE BLDC GLUCOMTR-MCNC: 120 MG/DL — HIGH (ref 70–99)
GLUCOSE SERPL-MCNC: 108 MG/DL — HIGH (ref 70–99)
HCT VFR BLD CALC: 38.4 % — SIGNIFICANT CHANGE UP (ref 34.5–45)
HGB BLD-MCNC: 11.9 G/DL — SIGNIFICANT CHANGE UP (ref 11.5–15.5)
IANC: 2.35 K/UL — SIGNIFICANT CHANGE UP (ref 1.8–7.4)
IMM GRANULOCYTES NFR BLD AUTO: 0.2 % — SIGNIFICANT CHANGE UP (ref 0–0.9)
LYMPHOCYTES # BLD AUTO: 2 K/UL — SIGNIFICANT CHANGE UP (ref 1–3.3)
LYMPHOCYTES # BLD AUTO: 38.2 % — SIGNIFICANT CHANGE UP (ref 13–44)
MAGNESIUM SERPL-MCNC: 2 MG/DL — SIGNIFICANT CHANGE UP (ref 1.6–2.6)
MCHC RBC-ENTMCNC: 22.9 PG — LOW (ref 27–34)
MCHC RBC-ENTMCNC: 31 GM/DL — LOW (ref 32–36)
MCV RBC AUTO: 74 FL — LOW (ref 80–100)
MONOCYTES # BLD AUTO: 0.51 K/UL — SIGNIFICANT CHANGE UP (ref 0–0.9)
MONOCYTES NFR BLD AUTO: 9.8 % — SIGNIFICANT CHANGE UP (ref 2–14)
NEUTROPHILS # BLD AUTO: 2.35 K/UL — SIGNIFICANT CHANGE UP (ref 1.8–7.4)
NEUTROPHILS NFR BLD AUTO: 44.9 % — SIGNIFICANT CHANGE UP (ref 43–77)
NRBC # BLD: 0 /100 WBCS — SIGNIFICANT CHANGE UP (ref 0–0)
NRBC # FLD: 0 K/UL — SIGNIFICANT CHANGE UP (ref 0–0)
PHOSPHATE SERPL-MCNC: 2.7 MG/DL — SIGNIFICANT CHANGE UP (ref 2.5–4.5)
PLATELET # BLD AUTO: 213 K/UL — SIGNIFICANT CHANGE UP (ref 150–400)
POTASSIUM SERPL-MCNC: 4.3 MMOL/L — SIGNIFICANT CHANGE UP (ref 3.5–5.3)
POTASSIUM SERPL-SCNC: 4.3 MMOL/L — SIGNIFICANT CHANGE UP (ref 3.5–5.3)
PROT SERPL-MCNC: 7.2 G/DL — SIGNIFICANT CHANGE UP (ref 6–8.3)
RBC # BLD: 5.19 M/UL — SIGNIFICANT CHANGE UP (ref 3.8–5.2)
RBC # FLD: 16.7 % — HIGH (ref 10.3–14.5)
SODIUM SERPL-SCNC: 138 MMOL/L — SIGNIFICANT CHANGE UP (ref 135–145)
WBC # BLD: 5.23 K/UL — SIGNIFICANT CHANGE UP (ref 3.8–10.5)
WBC # FLD AUTO: 5.23 K/UL — SIGNIFICANT CHANGE UP (ref 3.8–10.5)

## 2024-02-21 PROCEDURE — 93306 TTE W/DOPPLER COMPLETE: CPT | Mod: 26

## 2024-02-21 PROCEDURE — 93010 ELECTROCARDIOGRAM REPORT: CPT

## 2024-02-21 RX ORDER — LOSARTAN POTASSIUM 100 MG/1
100 TABLET, FILM COATED ORAL DAILY
Refills: 0 | Status: DISCONTINUED | OUTPATIENT
Start: 2024-02-21 | End: 2024-02-22

## 2024-02-21 RX ORDER — ASPIRIN/CALCIUM CARB/MAGNESIUM 324 MG
81 TABLET ORAL DAILY
Refills: 0 | Status: DISCONTINUED | OUTPATIENT
Start: 2024-02-21 | End: 2024-02-21

## 2024-02-21 RX ORDER — BUMETANIDE 0.25 MG/ML
1 INJECTION INTRAMUSCULAR; INTRAVENOUS DAILY
Refills: 0 | Status: DISCONTINUED | OUTPATIENT
Start: 2024-02-21 | End: 2024-02-22

## 2024-02-21 RX ORDER — LEVOTHYROXINE SODIUM 125 MCG
75 TABLET ORAL DAILY
Refills: 0 | Status: DISCONTINUED | OUTPATIENT
Start: 2024-02-21 | End: 2024-02-22

## 2024-02-21 RX ORDER — METOPROLOL TARTRATE 50 MG
0.5 TABLET ORAL
Qty: 30 | Refills: 0
Start: 2024-02-21 | End: 2024-03-21

## 2024-02-21 RX ORDER — NIFEDIPINE 30 MG
90 TABLET, EXTENDED RELEASE 24 HR ORAL DAILY
Refills: 0 | Status: DISCONTINUED | OUTPATIENT
Start: 2024-02-21 | End: 2024-02-22

## 2024-02-21 RX ORDER — LABETALOL HCL 100 MG
1 TABLET ORAL
Refills: 0 | DISCHARGE

## 2024-02-21 RX ORDER — DONEPEZIL HYDROCHLORIDE 10 MG/1
10 TABLET, FILM COATED ORAL AT BEDTIME
Refills: 0 | Status: DISCONTINUED | OUTPATIENT
Start: 2024-02-21 | End: 2024-02-22

## 2024-02-21 RX ORDER — ATORVASTATIN CALCIUM 80 MG/1
1 TABLET, FILM COATED ORAL
Qty: 0 | Refills: 0 | DISCHARGE

## 2024-02-21 RX ADMIN — HEPARIN SODIUM 5000 UNIT(S): 5000 INJECTION INTRAVENOUS; SUBCUTANEOUS at 05:43

## 2024-02-21 RX ADMIN — Medication 90 MILLIGRAM(S): at 11:26

## 2024-02-21 RX ADMIN — Medication 12.5 MILLIGRAM(S): at 05:44

## 2024-02-21 RX ADMIN — HEPARIN SODIUM 5000 UNIT(S): 5000 INJECTION INTRAVENOUS; SUBCUTANEOUS at 18:18

## 2024-02-21 RX ADMIN — Medication 75 MICROGRAM(S): at 11:26

## 2024-02-21 RX ADMIN — DONEPEZIL HYDROCHLORIDE 10 MILLIGRAM(S): 10 TABLET, FILM COATED ORAL at 22:11

## 2024-02-21 RX ADMIN — Medication 81 MILLIGRAM(S): at 11:25

## 2024-02-21 RX ADMIN — LOSARTAN POTASSIUM 100 MILLIGRAM(S): 100 TABLET, FILM COATED ORAL at 11:26

## 2024-02-21 RX ADMIN — BUMETANIDE 1 MILLIGRAM(S): 0.25 INJECTION INTRAMUSCULAR; INTRAVENOUS at 11:26

## 2024-02-21 RX ADMIN — Medication 12.5 MILLIGRAM(S): at 18:21

## 2024-02-21 NOTE — DISCHARGE NOTE NURSING/CASE MANAGEMENT/SOCIAL WORK - PATIENT PORTAL LINK FT
You can access the FollowMyHealth Patient Portal offered by [facility] by registering at the following website: [email or web address]. By joining [facility]’s FollowMyHealth portal, you will also be able to view your health information using other applications (apps) compatible with our system.

## 2024-02-21 NOTE — DISCHARGE NOTE PROVIDER - HOSPITAL COURSE
83 F PMHx HTN, CHF, hypothyroidism, OA, obesity, DM (A1c 6.3), presented to the ED secondary to left sided chest pain associated with SOB and radiation to Lt arm/neck. Reports intermittent edema of legs. S/P  mg in ED. HST 68 < 66, proBNP 1608. TSH 0.86. ECG sinus rhythm with marked sinus arrhythmia with 1st degree AVB at 66 bpm, QTc = 448.  CXR w/o acute changes. Continued on ASA, BB, Atorvastatin. Cardio consulted. Elevated troponins likely secondary to CKD. TTE done ______________               83 F PMHx HTN, CHF, hypothyroidism, OA, obesity, DM (A1c 6.3), presented to the ED secondary to left sided chest pain associated with SOB and radiation to Lt arm/neck. Reports intermittent edema of legs. S/P  mg in ED. HST 68 < 66, proBNP 1608. TSH 0.86. ECG sinus rhythm with marked sinus arrhythmia with 1st degree AVB at 66 bpm, QTc = 448.  CXR w/o acute changes. Continued on ASA, BB, Atorvastatin. Cardio consulted. Elevated troponins likely secondary to CKD. TTE done ______________                83 F PMHx HTN, CHF, hypothyroidism, OA, obesity, DM (A1c 6.3), presented to the ED secondary to left sided chest pain associated with SOB and radiation to Lt arm/neck. Reports intermittent edema of legs. S/P  mg in ED. HST 68 < 66, proBNP 1608. TSH 0.86. ECG sinus rhythm with marked sinus arrhythmia with 1st degree AVB at 66 bpm, QTc = 448.  CXR w/o acute changes. Continued on ASA, BB, Atorvastatin. Cardio consulted. Elevated troponins likely secondary to CKD. TTE done, showing EF 51%.     Patient is medically cleared for discharge on 2/21/2024 per attending Dr. Browne.

## 2024-02-21 NOTE — DISCHARGE NOTE PROVIDER - CARE PROVIDER_API CALL
Anirudh Bauer [facility]  [address] [place] [address]  Phone: [phone]  Fax: [phone]  Follow Up Time:

## 2024-02-21 NOTE — PROGRESS NOTE ADULT - ASSESSMENT
83 year old female, with past history of HTN, CHF, Hypothyroidism, OA, Obesity and Type-2 DM, presented to the ED secondary to left sided chest pain. Nephrology consulted for renal failure    A/P   CKD 3a:  SCr at baseline   renal diet  avoid nephrotoxins   Monitor at present    HTN:   BP fluctuating   continue current meds  Monitor BP     Chest pain:  work up per cardio

## 2024-02-21 NOTE — DISCHARGE NOTE PROVIDER - NSDCMRMEDTOKEN_GEN_ALL_CORE_FT
aspirin 81 mg oral tablet: 1 tab(s) orally once a day  atorvastatin 10 mg oral tablet: 1 tab(s) orally once a day  Bumex 1 mg oral tablet: 1 tab(s) orally once a day  donepezil 10 mg oral tablet: 1 tab(s) orally once a day (at bedtime)  Farxiga 10 mg oral tablet: 1 tab(s) orally once a day  glimepiride 4 mg oral tablet: 1 tab(s) orally once a day  Januvia 100 mg oral tablet: 1 tab(s) orally once a day  labetalol 200 mg oral tablet: 1 tab(s) orally 2 times a day  levothyroxine 75 mcg (0.075 mg) oral tablet: 1 tab(s) orally once a day  losartan 100 mg oral tablet: 1 tab(s) orally once a day  metFORMIN 500 mg oral tablet: 1 tab(s) orally once a day  NIFEdipine 90 mg oral tablet, extended release: 1 tab(s) orally once a day  nitroglycerin 0.4 mg sublingual tablet: 1 tab(s) sublingually every 5 minutes as needed for  chest pain   aspirin 81 mg oral tablet: 1 tab(s) orally once a day  Bumex 1 mg oral tablet: 1 tab(s) orally once a day  donepezil 10 mg oral tablet: 1 tab(s) orally once a day (at bedtime)  Farxiga 10 mg oral tablet: 1 tab(s) orally once a day  glimepiride 4 mg oral tablet: 1 tab(s) orally once a day  Januvia 100 mg oral tablet: 1 tab(s) orally once a day  levothyroxine 75 mcg (0.075 mg) oral tablet: 1 tab(s) orally once a day  losartan 100 mg oral tablet: 1 tab(s) orally once a day  metFORMIN 500 mg oral tablet: 1 tab(s) orally once a day  Metoprolol Tartrate 25 mg oral tablet: 0.5 tab(s) orally 2 times a day  NIFEdipine 90 mg oral tablet, extended release: 1 tab(s) orally once a day  nitroglycerin 0.4 mg sublingual tablet: 1 tab(s) sublingually every 5 minutes as needed for  chest pain   aspirin 81 mg oral tablet: 1 tab(s) orally once a day  Bumex 1 mg oral tablet: 1 tab(s) orally once a day  donepezil 10 mg oral tablet: 1 tab(s) orally once a day (at bedtime)  Farxiga 10 mg oral tablet: 1 tab(s) orally once a day  glimepiride 4 mg oral tablet: 1 tab(s) orally once a day  Januvia 100 mg oral tablet: 1 tab(s) orally once a day  levothyroxine 75 mcg (0.075 mg) oral tablet: 1 tab(s) orally once a day  losartan 100 mg oral tablet: 1 tab(s) orally once a day  metFORMIN 500 mg oral tablet: 1 tab(s) orally once a day  Metoprolol Tartrate 25 mg oral tablet: 0.5 tab(s) orally 2 times a day  NIFEdipine 90 mg oral tablet, extended release: 1 tab(s) orally once a day  nitroglycerin 0.4 mg sublingual tablet: 1 tab(s) sublingually every 5 minutes as needed for  chest pain Up to 3 tabs a day.

## 2024-02-21 NOTE — DISCHARGE NOTE PROVIDER - NSDCCPCAREPLAN_GEN_ALL_CORE_FT
PRINCIPAL DISCHARGE DIAGNOSIS  Diagnosis: Chest pain  Assessment and Plan of Treatment: Improved. You were seen by Cardiology in-hospital. Your echocardiogram done __________________________. Monitor for worsening chest pain, shortness of breath, palpitations.      SECONDARY DISCHARGE DIAGNOSES  Diagnosis: Essential hypertension  Assessment and Plan of Treatment: Continue blood pressure medication regimen as directed. Monitor for any visual changes, headaches or dizziness.  Monitor blood pressure regularly.  Follow up with your PCP for further management for high blood pressure.      Diagnosis: Hypothyroidism  Assessment and Plan of Treatment: Continue your thyroid medications as recommended and follow-up with your outpatient provider for continual thyroid function testing and further medication management.      Diagnosis: Type 2 diabetes mellitus  Assessment and Plan of Treatment: Continue consistent carbohydrate diet.  Monitor blood glucose levels throughout the day before meals and at bedtime.  Record blood sugars and bring to outpatient providers appointment in order to be reviewed by your doctor for management modifications.  Be aware of diabetes management symptoms including feeling cool and clammy may be related to low glucose levels.  Feeling hot and dry may indicate high glucose levels. If you feel these symptoms, check your blood sugar.  Make regular podiatry appointments in order to have feet checked for wounds and toe nails cut by a doctor to prevent infections, as well as, appointments with an ophthalmologist to monitor your vision.       PRINCIPAL DISCHARGE DIAGNOSIS  Diagnosis: Chest pain  Assessment and Plan of Treatment: Improved. You were seen by Cardiology in-hospital. Your echocardiogram done, showing ejection fraction of 51%. Monitor for worsening chest pain, shortness of breath, palpitations.      SECONDARY DISCHARGE DIAGNOSES  Diagnosis: Type 2 diabetes mellitus  Assessment and Plan of Treatment: Continue consistent carbohydrate diet.  Monitor blood glucose levels throughout the day before meals and at bedtime.  Record blood sugars and bring to outpatient providers appointment in order to be reviewed by your doctor for management modifications.  Be aware of diabetes management symptoms including feeling cool and clammy may be related to low glucose levels.  Feeling hot and dry may indicate high glucose levels. If you feel these symptoms, check your blood sugar.  Make regular podiatry appointments in order to have feet checked for wounds and toe nails cut by a doctor to prevent infections, as well as, appointments with an ophthalmologist to monitor your vision.      Diagnosis: Essential hypertension  Assessment and Plan of Treatment: Continue blood pressure medication regimen as directed. Monitor for any visual changes, headaches or dizziness.  Monitor blood pressure regularly.  Follow up with your PCP for further management for high blood pressure.      Diagnosis: Hypothyroidism  Assessment and Plan of Treatment: Continue your thyroid medications as recommended and follow-up with your outpatient provider for continual thyroid function testing and further medication management.

## 2024-02-21 NOTE — DISCHARGE NOTE PROVIDER - DISCHARGE DIET
Regular Diet - No restrictions/DASH Diet/Consistent Carbohydrate Diabetic Diets/Other Diet Instructions

## 2024-02-22 VITALS
DIASTOLIC BLOOD PRESSURE: 78 MMHG | SYSTOLIC BLOOD PRESSURE: 116 MMHG | RESPIRATION RATE: 18 BRPM | OXYGEN SATURATION: 99 % | HEART RATE: 73 BPM | TEMPERATURE: 98 F

## 2024-02-22 LAB
ALBUMIN SERPL ELPH-MCNC: 4.2 G/DL — SIGNIFICANT CHANGE UP (ref 3.3–5)
ALP SERPL-CCNC: 89 U/L — SIGNIFICANT CHANGE UP (ref 40–120)
ALT FLD-CCNC: 53 U/L — HIGH (ref 4–33)
ANION GAP SERPL CALC-SCNC: 12 MMOL/L — SIGNIFICANT CHANGE UP (ref 7–14)
AST SERPL-CCNC: 48 U/L — HIGH (ref 4–32)
BILIRUB SERPL-MCNC: 1.1 MG/DL — SIGNIFICANT CHANGE UP (ref 0.2–1.2)
BUN SERPL-MCNC: 23 MG/DL — SIGNIFICANT CHANGE UP (ref 7–23)
CALCIUM SERPL-MCNC: 9.5 MG/DL — SIGNIFICANT CHANGE UP (ref 8.4–10.5)
CHLORIDE SERPL-SCNC: 99 MMOL/L — SIGNIFICANT CHANGE UP (ref 98–107)
CO2 SERPL-SCNC: 26 MMOL/L — SIGNIFICANT CHANGE UP (ref 22–31)
CREAT SERPL-MCNC: 1 MG/DL — SIGNIFICANT CHANGE UP (ref 0.5–1.3)
EGFR: 55 ML/MIN/1.73M2 — LOW
GLUCOSE BLDC GLUCOMTR-MCNC: 121 MG/DL — HIGH (ref 70–99)
GLUCOSE SERPL-MCNC: 109 MG/DL — HIGH (ref 70–99)
HCT VFR BLD CALC: 41.1 % — SIGNIFICANT CHANGE UP (ref 34.5–45)
HGB BLD-MCNC: 12.8 G/DL — SIGNIFICANT CHANGE UP (ref 11.5–15.5)
MAGNESIUM SERPL-MCNC: 1.8 MG/DL — SIGNIFICANT CHANGE UP (ref 1.6–2.6)
MCHC RBC-ENTMCNC: 23.2 PG — LOW (ref 27–34)
MCHC RBC-ENTMCNC: 31.1 GM/DL — LOW (ref 32–36)
MCV RBC AUTO: 74.5 FL — LOW (ref 80–100)
NRBC # BLD: 0 /100 WBCS — SIGNIFICANT CHANGE UP (ref 0–0)
NRBC # FLD: 0 K/UL — SIGNIFICANT CHANGE UP (ref 0–0)
PHOSPHATE SERPL-MCNC: 2.9 MG/DL — SIGNIFICANT CHANGE UP (ref 2.5–4.5)
PLATELET # BLD AUTO: 213 K/UL — SIGNIFICANT CHANGE UP (ref 150–400)
POTASSIUM SERPL-MCNC: 4.4 MMOL/L — SIGNIFICANT CHANGE UP (ref 3.5–5.3)
POTASSIUM SERPL-SCNC: 4.4 MMOL/L — SIGNIFICANT CHANGE UP (ref 3.5–5.3)
PROT SERPL-MCNC: 7.7 G/DL — SIGNIFICANT CHANGE UP (ref 6–8.3)
RBC # BLD: 5.52 M/UL — HIGH (ref 3.8–5.2)
RBC # FLD: 17.2 % — HIGH (ref 10.3–14.5)
SODIUM SERPL-SCNC: 137 MMOL/L — SIGNIFICANT CHANGE UP (ref 135–145)
WBC # BLD: 5.1 K/UL — SIGNIFICANT CHANGE UP (ref 3.8–10.5)
WBC # FLD AUTO: 5.1 K/UL — SIGNIFICANT CHANGE UP (ref 3.8–10.5)

## 2024-02-22 RX ORDER — NITROGLYCERIN 6.5 MG
1 CAPSULE, EXTENDED RELEASE ORAL
Qty: 180 | Refills: 0
Start: 2024-02-22 | End: 2024-03-22

## 2024-02-22 RX ORDER — NITROGLYCERIN 6.5 MG
1 CAPSULE, EXTENDED RELEASE ORAL
Refills: 0 | DISCHARGE

## 2024-02-22 RX ADMIN — Medication 75 MICROGRAM(S): at 05:48

## 2024-02-22 RX ADMIN — HEPARIN SODIUM 5000 UNIT(S): 5000 INJECTION INTRAVENOUS; SUBCUTANEOUS at 05:51

## 2024-02-22 RX ADMIN — LOSARTAN POTASSIUM 100 MILLIGRAM(S): 100 TABLET, FILM COATED ORAL at 05:48

## 2024-02-22 RX ADMIN — Medication 90 MILLIGRAM(S): at 05:47

## 2024-02-22 RX ADMIN — Medication 81 MILLIGRAM(S): at 11:50

## 2024-02-22 RX ADMIN — BUMETANIDE 1 MILLIGRAM(S): 0.25 INJECTION INTRAMUSCULAR; INTRAVENOUS at 05:48

## 2024-02-22 NOTE — PROGRESS NOTE ADULT - SUBJECTIVE AND OBJECTIVE BOX
Cardiovascular Disease Progress Note  Date of Service: 02-22-24 @ 08:47    Overnight events: No acute events overnight.    The patient denies chest pain or SOB.   Otherwise review of systems negative    Objective Findings:  T(C): 36.7 (02-22-24 @ 05:45), Max: 36.8 (02-21-24 @ 11:08)  HR: 73 (02-22-24 @ 05:45) (71 - 78)  BP: 110/60 (02-22-24 @ 05:45) (108/55 - 137/91)  RR: 18 (02-22-24 @ 05:45) (18 - 19)  SpO2: 99% (02-22-24 @ 05:45) (97% - 100%)  Wt(kg): --  Daily     Daily       Physical Exam:  Gen: NAD; Patient resting comfortably  HEENT: EOMI, Normocephalic/ atraumatic  CV: RRR, normal S1 + S2, no m/r/g  Lungs:  Normal respiratory effort; clear to auscultation bilaterally  Abd: soft, non-tender; bowel sounds present  Ext: No edema; warm and well perfused    Telemetry: Sinus with APCs    Laboratory Data:                        12.8   5.10  )-----------( 213      ( 22 Feb 2024 06:43 )             41.1     02-22    137  |  99  |  23  ----------------------------<  109<H>  4.4   |  26  |  1.00    Ca    9.5      22 Feb 2024 06:43  Phos  2.9     02-22  Mg     1.80     02-22    TPro  7.7  /  Alb  4.2  /  TBili  1.1  /  DBili  x   /  AST  48<H>  /  ALT  53<H>  /  AlkPhos  89  02-22              Inpatient Medications:  MEDICATIONS  (STANDING):  aspirin enteric coated 81 milliGRAM(s) Oral daily  buMETAnide 1 milliGRAM(s) Oral daily  dextrose 5%. 1000 milliLiter(s) (50 mL/Hr) IV Continuous <Continuous>  dextrose 5%. 1000 milliLiter(s) (100 mL/Hr) IV Continuous <Continuous>  dextrose 50% Injectable 25 Gram(s) IV Push once  dextrose 50% Injectable 25 Gram(s) IV Push once  dextrose 50% Injectable 12.5 Gram(s) IV Push once  donepezil 10 milliGRAM(s) Oral at bedtime  glucagon  Injectable 1 milliGRAM(s) IntraMuscular once  heparin   Injectable 5000 Unit(s) SubCutaneous every 12 hours  insulin lispro (ADMELOG) corrective regimen sliding scale   SubCutaneous three times a day before meals  insulin lispro (ADMELOG) corrective regimen sliding scale   SubCutaneous at bedtime  levothyroxine 75 MICROGram(s) Oral daily  losartan 100 milliGRAM(s) Oral daily  metoprolol tartrate 12.5 milliGRAM(s) Oral every 12 hours  NIFEdipine XL 90 milliGRAM(s) Oral daily      Assessment: 83 year old woman with HTN, Hypothyroidism, OA, Obesity and Type-2 DM presents with chest pain.     Plan of Care:    #Chest pain-  Mrs. Melchor currently denies chest pain and her EKG is nonischemic.   Elevated troponins are likely secondary to CKD III status.  Echo shows an overall preserved LVEF with no segmental wall motion abnormality.  No further inpatient cardiac work up is warranted at this time.        #HTN-  BP acceptable.  Pharmacy assistance with home medication clarification greatly appreciated.     #Compensated diastlic CHF-  Associated with group II severe pHTN.  Patient is not fluid overloaded.  Home dose Bumex resumed.     PT evaluation appreciated- no needs.  Discharge planning today.  Ms. Melchor is agreeable.         Over 55 minutes spent on total encounter; more than 50% of the visit was spent counseling and/or coordinating care by the attending physician.      Boby Browne MD [facility]  Cardiovascular Disease  [phone]
[facility]   MD JASPREET RICHARD MD ANGELA WONG, PA Venitha Krishnan, NP    TEL:  OFFICE: [phone]  From 5pm-7am Answering Service [phone]    -- RENAL FOLLOW UP NOTE ---Date of Service 02-21-24 @ 15:33    Patient is a 83y old  Female who presents with a chief complaint of Chest pain    Patient seen and examined at bedside. No chest pain/sob    VITALS:  T(F): 98.3 (02-21-24 @ 11:08), Max: 98.3 (02-21-24 @ 11:08)  HR: 71 (02-21-24 @ 11:08)  BP: 137/91 (02-21-24 @ 11:08)  RR: 18 (02-21-24 @ 11:08)  SpO2: 100% (02-21-24 @ 11:08)  Wt(kg): --        PHYSICAL EXAM:  General: NAD  Neck: No JVD  Respiratory: CTAB, no wheezes, rales or rhonchi  Cardiovascular: S1, S2, RRR  Gastrointestinal: BS+, soft, NT/ND  Extremities: No peripheral edema    Hospital Medications:   MEDICATIONS  (STANDING):  aspirin enteric coated 81 milliGRAM(s) Oral daily  buMETAnide 1 milliGRAM(s) Oral daily  dextrose 5%. 1000 milliLiter(s) (50 mL/Hr) IV Continuous <Continuous>  dextrose 5%. 1000 milliLiter(s) (100 mL/Hr) IV Continuous <Continuous>  dextrose 50% Injectable 25 Gram(s) IV Push once  dextrose 50% Injectable 25 Gram(s) IV Push once  dextrose 50% Injectable 12.5 Gram(s) IV Push once  donepezil 10 milliGRAM(s) Oral at bedtime  glucagon  Injectable 1 milliGRAM(s) IntraMuscular once  heparin   Injectable 5000 Unit(s) SubCutaneous every 12 hours  insulin lispro (ADMELOG) corrective regimen sliding scale   SubCutaneous three times a day before meals  insulin lispro (ADMELOG) corrective regimen sliding scale   SubCutaneous at bedtime  levothyroxine 75 MICROGram(s) Oral daily  losartan 100 milliGRAM(s) Oral daily  metoprolol tartrate 12.5 milliGRAM(s) Oral every 12 hours  NIFEdipine XL 90 milliGRAM(s) Oral daily      LABS:  02-21    138  |  101  |  24<H>  ----------------------------<  108<H>  4.3   |  25  |  1.07    Ca    9.4      21 Feb 2024 05:43  Phos  2.7     02-21  Mg     2.00     02-21    TPro  7.2  /  Alb  3.8  /  TBili  0.9  /  DBili      /  AST  45<H>  /  ALT  44<H>  /  AlkPhos  82  02-21    Creatinine Trend: 1.07 <--, 1.12 <--, 1.16 <--    Albumin: 3.8 g/dL (02-21 @ 05:43)  Phosphorus: 2.7 mg/dL (02-21 @ 05:43)                              11.9   5.23  )-----------( 213      ( 21 Feb 2024 05:43 )             38.4     Urine Studies:  Urinalysis - [02-21-24 @ 05:43]      Color  / Appearance  / SG  / pH       Gluc 108 / Ketone   / Bili  / Urobili        Blood  / Protein  / Leuk Est  / Nitrite       RBC  / WBC  / Hyaline  / Gran  / Sq Epi  / Non Sq Epi  / Bacteria       Vitamin D (25OH) 34.1      [02-20-24 @ 06:20]  HbA1c 6.1      [01-02-20 @ 12:54]  TSH 0.86      [02-19-24 @ 20:30]  Lipid: chol 93, TG 60, HDL 36, LDL --      [02-20-24 @ 06:20]        RADIOLOGY & ADDITIONAL STUDIES:  
Cardiovascular Disease Progress Note  Date of Service: 02-21-24 @ 07:25    Overnight events: No acute events overnight.    The patient denies chest pain or SOB.   Otherwise review of systems negative    Objective Findings:  T(C): 36.6 (02-21-24 @ 05:30), Max: 36.7 (02-20-24 @ 22:00)  HR: 64 (02-21-24 @ 05:30) (64 - 72)  BP: 140/79 (02-21-24 @ 05:30) (125/63 - 151/75)  RR: 18 (02-21-24 @ 05:30) (18 - 21)  SpO2: 100% (02-21-24 @ 05:30) (96% - 100%)  Wt(kg): --  Daily     Daily       Physical Exam:  Gen: NAD; Patient resting comfortably  HEENT: EOMI, Normocephalic/ atraumatic  CV: RRR, normal S1 + S2, no m/r/g  Lungs:  Normal respiratory effort; clear to auscultation bilaterally  Abd: soft, non-tender; bowel sounds present  Ext: No edema; warm and well perfused    Telemetry: Sinus; no ectopy    Laboratory Data:                        11.9   5.23  )-----------( 213      ( 21 Feb 2024 05:43 )             38.4     02-20    136  |  98  |  21  ----------------------------<  100<H>  4.2   |  27  |  1.12    Ca    9.7      20 Feb 2024 06:20  Phos  3.1     02-20  Mg     2.00     02-20    TPro  7.5  /  Alb  4.0  /  TBili  0.8  /  DBili  x   /  AST  31  /  ALT  31  /  AlkPhos  84  02-20    PT/INR - ( 19 Feb 2024 20:30 )   PT: 12.2 sec;   INR: 1.08 ratio         PTT - ( 19 Feb 2024 20:30 )  PTT:39.8 sec          Inpatient Medications:  MEDICATIONS  (STANDING):  aspirin enteric coated 81 milliGRAM(s) Oral daily  atorvastatin 40 milliGRAM(s) Oral at bedtime  dextrose 5%. 1000 milliLiter(s) (50 mL/Hr) IV Continuous <Continuous>  dextrose 5%. 1000 milliLiter(s) (100 mL/Hr) IV Continuous <Continuous>  dextrose 50% Injectable 12.5 Gram(s) IV Push once  dextrose 50% Injectable 25 Gram(s) IV Push once  dextrose 50% Injectable 25 Gram(s) IV Push once  glucagon  Injectable 1 milliGRAM(s) IntraMuscular once  heparin   Injectable 5000 Unit(s) SubCutaneous every 12 hours  insulin lispro (ADMELOG) corrective regimen sliding scale   SubCutaneous at bedtime  insulin lispro (ADMELOG) corrective regimen sliding scale   SubCutaneous three times a day before meals  metoprolol tartrate 12.5 milliGRAM(s) Oral every 12 hours      Assessment: 83 year old woman with HTN, Hypothyroidism, OA, Obesity and Type-2 DM presents with chest pain.     Plan of Care:    #Chest pain-  Mrs. Melchor currently denies chest pain and her EKG is nonischemic.   Elevated troponins are likely secondary to CKD III status.  Awaiting echo prior to considering an ischemic work up.    #HTN-  BP acceptable.  Pharmacy assistance with home medication clarification greatly appreciated.     #Compensated CHF-  Unknown EF.  Patient is not fluid overloaded.  Home dose Bumex resumed.     #ACP (advance care planning)-  Advanced care planning was discussed with the patient.  Advance care planning forms were discussed. Risks, benefits and alternatives of medical treatment and procedures were discussed in detail and all questions were answered. 30 additional minutes spent addressing advance care plans.    PT evaluation appreciated- no needs.  Discharge planning today if no acute findings on echo.             Over 55 minutes spent on total encounter; more than 50% of the visit was spent counseling and/or coordinating care by the attending physician.      Boby Browne MD [facility]  Cardiovascular Disease  [phone]

## 2024-04-05 ENCOUNTER — APPOINTMENT (OUTPATIENT)
Dept: ORTHOPEDIC SURGERY | Facility: CLINIC | Age: 84
End: 2024-04-05

## 2024-06-20 NOTE — ASU PREOP CHECKLIST - CHLOROHEXIDINE WASH IN ASU
June 20, 2024     Letty Jimenez MD  [address] [place] [id number]    Patient: Chirag Lemus   YOB: 2014   Date of Visit: 6/20/2024       Dear Dr. Letty Jimenez MD:    Thank you for referring Chirag Lemus to me for evaluation. Below are my notes for this consultation.  If you have questions, please do not hesitate to call me. I look forward to following your patient along with you.       Sincerely,     Macarena Rojas, APRN-CNP, APRN-CNS      CC: No Recipients  ______________________________________________________________________________________    Subjective  Chirag Lemus is a 9 y.o.   male.  ZACH Beard is an 9 year old boy with ADHD and at times, challenging behavior. He was last seen in August.      He will be going into the 7th grade this fall. He will be going into a new building and will be able to do a tour before the start of the school year. He has an IEP and gets academic and behavioral supports.      This summer he may be going to [employer].     Overall things are going well. He was sleep walking for a bit but that has improved.     He had been to the ER for chest pain, hurt when he inhaled. Family noted that Motrin helped. EKG was negative.      The Abilify helps with anger management. He will shut down with mom on occasion but less than in the past.      He is on Concerta 27 mg which seems to be working well. He is also on Abilify 7.5 mg at bed. Mom notes that the doses have been working well.      He tells me that overall he is not worried, when it happens it is more situational.      He has a bigger friend group than in the past.     He has a brief arm and neck tic. He will also raise his eye brows, For the most part these do not bother him.     Objective  Neurological Exam  Mental Status  Awake and alert. Oriented to person, place and time. Speech is normal. Language is fluent with no aphasia. Attention and concentration are normal. Fund of knowledge is  appropriate for level of education.    Cranial Nerves  CN II: Visual fields full to confrontation.  CN III, IV, VI: Extraocular movements intact bilaterally. Pupils equal round and reactive to light bilaterally.  CN V: Facial sensation is normal.  CN VII: Full and symmetric facial movement.  CN VIII: Hearing is normal.  CN IX, X: Palate elevates symmetrically  CN XI: Shoulder shrug strength is normal.  CN XII: Tongue midline without atrophy or fasciculations.    Motor  Normal muscle bulk throughout. Normal muscle tone. Strength is 5/5 throughout all four extremities.    Sensory  Light touch is normal in upper and lower extremities.     Reflexes                                            Right                      Left  Brachioradialis                    2+                         2+  Biceps                                 2+                         2+  Patellar                                2+                         2+  Achilles                                2+                         2+    Coordination  Right: Rapid alternating movement normal.Left: Rapid alternating movement normal.    Gait  Casual gait is normal including stance, stride, and arm swing.    Physical Exam  Constitutional:       General: He is awake.   Eyes:      Extraocular Movements: Extraocular movements intact.      Pupils: Pupils are equal, round, and reactive to light.   Neurological:      Mental Status: He is alert.      Motor: Motor strength is normal.     Deep Tendon Reflexes:      Reflex Scores:       Bicep reflexes are 2+ on the right side and 2+ on the left side.       Brachioradialis reflexes are 2+ on the right side and 2+ on the left side.       Patellar reflexes are 2+ on the right side and 2+ on the left side.       Achilles reflexes are 2+ on the right side and 2+ on the left side.  Psychiatric:         Speech: Speech normal.           Assessment/Plan  Ray is doing well overall. Sleep parasomnias have occurred at times. Mood and  anxiety are good.  He is dong well with the medication. I have talked with mom about the following::     1. Continue with current Concerta dose, refills will be provided.   2. Continue with Abilify dose, refills will be provided  3. Look into counseling options for consistency.   4. Continue with academic supports  5. Please call with an update on his behavior. My nurse is Katherine Portillo at [phone].  6. Follow up in 6 months.   7. Watch the frequency of the tics, can add Magnesium 200 mg daily.    06-Jul-2017

## 2024-10-11 NOTE — DOWNTIME INTERRUPTION NOTE - WHICH MANUAL FORMS INITIATED?
Chief Complaint   Patient presents with    Congestion     1. Have you been to the ER, urgent care clinic since your last visit?  Hospitalized since your last visit?No    2. Have you seen or consulted any other health care providers outside of the [facility] since your last visit?  Include any pap smears or colon screening. No    Vitals:    10/11/24 1010   Pulse: 138   Temp: 97.4 °F (36.3 °C)   TempSrc: Axillary   SpO2: 99%   Weight: 14.5 kg (32 lb)         See paper chart Heart sounds: Normal heart sounds, S1 normal and S2 normal.   Pulmonary:      Effort: Pulmonary effort is normal.      Breath sounds: Normal breath sounds. Transmitted upper airway sounds present.   Abdominal:      General: Abdomen is flat. Bowel sounds are normal.      Palpations: Abdomen is soft.   Musculoskeletal:         General: Normal range of motion.      Cervical back: Normal range of motion and neck supple.   Skin:     General: Skin is warm.      Capillary Refill: Capillary refill takes less than 2 seconds.   Neurological:      Mental Status: He is alert and oriented for age.                     An electronic signature was used to authenticate this note.

## 2024-10-14 ENCOUNTER — INPATIENT (INPATIENT)
Facility: HOSPITAL | Age: 84
LOS: 0 days | Discharge: HOME | End: 2024-10-15
Attending: INTERNAL MEDICINE | Admitting: INTERNAL MEDICINE
Payer: MEDICARE

## 2024-10-14 VITALS
SYSTOLIC BLOOD PRESSURE: 113 MMHG | HEART RATE: 68 BPM | OXYGEN SATURATION: 97 % | RESPIRATION RATE: 18 BRPM | TEMPERATURE: 98 F | WEIGHT: 164.91 LBS | DIASTOLIC BLOOD PRESSURE: 72 MMHG

## 2024-10-14 DIAGNOSIS — R07.9 CHEST PAIN, UNSPECIFIED: ICD-10-CM

## 2024-10-14 DIAGNOSIS — N18.30 CHRONIC KIDNEY DISEASE, STAGE 3 UNSPECIFIED: ICD-10-CM

## 2024-10-14 DIAGNOSIS — Z98.890 OTHER SPECIFIED POSTPROCEDURAL STATES: Chronic | ICD-10-CM

## 2024-10-14 DIAGNOSIS — I10 ESSENTIAL (PRIMARY) HYPERTENSION: ICD-10-CM

## 2024-10-14 DIAGNOSIS — E03.9 HYPOTHYROIDISM, UNSPECIFIED: ICD-10-CM

## 2024-10-14 DIAGNOSIS — I50.9 HEART FAILURE, UNSPECIFIED: ICD-10-CM

## 2024-10-14 DIAGNOSIS — N60.09 SOLITARY CYST OF UNSPECIFIED BREAST: Chronic | ICD-10-CM

## 2024-10-14 DIAGNOSIS — Z96.642 PRESENCE OF LEFT ARTIFICIAL HIP JOINT: Chronic | ICD-10-CM

## 2024-10-14 DIAGNOSIS — E11.9 TYPE 2 DIABETES MELLITUS WITHOUT COMPLICATIONS: ICD-10-CM

## 2024-10-14 DIAGNOSIS — Z90.710 ACQUIRED ABSENCE OF BOTH CERVIX AND UTERUS: Chronic | ICD-10-CM

## 2024-10-14 LAB
ALBUMIN SERPL ELPH-MCNC: 3.9 G/DL — SIGNIFICANT CHANGE UP (ref 3.3–5)
ALP SERPL-CCNC: 82 U/L — SIGNIFICANT CHANGE UP (ref 40–120)
ALT FLD-CCNC: 15 U/L — SIGNIFICANT CHANGE UP (ref 4–33)
ANION GAP SERPL CALC-SCNC: 12 MMOL/L — SIGNIFICANT CHANGE UP (ref 7–14)
AST SERPL-CCNC: 27 U/L — SIGNIFICANT CHANGE UP (ref 4–32)
BASOPHILS # BLD AUTO: 0.03 K/UL — SIGNIFICANT CHANGE UP (ref 0–0.2)
BASOPHILS NFR BLD AUTO: 0.7 % — SIGNIFICANT CHANGE UP (ref 0–2)
BILIRUB SERPL-MCNC: 0.5 MG/DL — SIGNIFICANT CHANGE UP (ref 0.2–1.2)
BLOOD GAS VENOUS COMPREHENSIVE RESULT: SIGNIFICANT CHANGE UP
BUN SERPL-MCNC: 31 MG/DL — HIGH (ref 7–23)
CALCIUM SERPL-MCNC: 9.9 MG/DL — SIGNIFICANT CHANGE UP (ref 8.4–10.5)
CHLORIDE SERPL-SCNC: 100 MMOL/L — SIGNIFICANT CHANGE UP (ref 98–107)
CO2 SERPL-SCNC: 25 MMOL/L — SIGNIFICANT CHANGE UP (ref 22–31)
CREAT SERPL-MCNC: 1.22 MG/DL — SIGNIFICANT CHANGE UP (ref 0.5–1.3)
EGFR: 43 ML/MIN/1.73M2 — LOW
EOSINOPHIL # BLD AUTO: 0.25 K/UL — SIGNIFICANT CHANGE UP (ref 0–0.5)
EOSINOPHIL NFR BLD AUTO: 5.5 % — SIGNIFICANT CHANGE UP (ref 0–6)
GLUCOSE BLDC GLUCOMTR-MCNC: 123 MG/DL — HIGH (ref 70–99)
GLUCOSE BLDC GLUCOMTR-MCNC: 94 MG/DL — SIGNIFICANT CHANGE UP (ref 70–99)
GLUCOSE SERPL-MCNC: 88 MG/DL — SIGNIFICANT CHANGE UP (ref 70–99)
HCT VFR BLD CALC: 45.5 % — HIGH (ref 34.5–45)
HGB BLD-MCNC: 14.5 G/DL — SIGNIFICANT CHANGE UP (ref 11.5–15.5)
IANC: 2.1 K/UL — SIGNIFICANT CHANGE UP (ref 1.8–7.4)
IMM GRANULOCYTES NFR BLD AUTO: 0.2 % — SIGNIFICANT CHANGE UP (ref 0–0.9)
LYMPHOCYTES # BLD AUTO: 1.78 K/UL — SIGNIFICANT CHANGE UP (ref 1–3.3)
LYMPHOCYTES # BLD AUTO: 38.9 % — SIGNIFICANT CHANGE UP (ref 13–44)
MAGNESIUM SERPL-MCNC: 2.1 MG/DL — SIGNIFICANT CHANGE UP (ref 1.6–2.6)
MCHC RBC-ENTMCNC: 23.7 PG — LOW (ref 27–34)
MCHC RBC-ENTMCNC: 31.9 GM/DL — LOW (ref 32–36)
MCV RBC AUTO: 74.2 FL — LOW (ref 80–100)
MONOCYTES # BLD AUTO: 0.41 K/UL — SIGNIFICANT CHANGE UP (ref 0–0.9)
MONOCYTES NFR BLD AUTO: 9 % — SIGNIFICANT CHANGE UP (ref 2–14)
NEUTROPHILS # BLD AUTO: 2.1 K/UL — SIGNIFICANT CHANGE UP (ref 1.8–7.4)
NEUTROPHILS NFR BLD AUTO: 45.7 % — SIGNIFICANT CHANGE UP (ref 43–77)
NRBC # BLD: 0 /100 WBCS — SIGNIFICANT CHANGE UP (ref 0–0)
NRBC # FLD: 0 K/UL — SIGNIFICANT CHANGE UP (ref 0–0)
NT-PROBNP SERPL-SCNC: 1358 PG/ML — HIGH
PLATELET # BLD AUTO: 197 K/UL — SIGNIFICANT CHANGE UP (ref 150–400)
POTASSIUM SERPL-MCNC: 4.2 MMOL/L — SIGNIFICANT CHANGE UP (ref 3.5–5.3)
POTASSIUM SERPL-SCNC: 4.2 MMOL/L — SIGNIFICANT CHANGE UP (ref 3.5–5.3)
PROT SERPL-MCNC: 7.4 G/DL — SIGNIFICANT CHANGE UP (ref 6–8.3)
RBC # BLD: 6.13 M/UL — HIGH (ref 3.8–5.2)
RBC # FLD: 16.1 % — HIGH (ref 10.3–14.5)
SODIUM SERPL-SCNC: 137 MMOL/L — SIGNIFICANT CHANGE UP (ref 135–145)
TROPONIN T, HIGH SENSITIVITY RESULT: 71 NG/L — CRITICAL HIGH
TROPONIN T, HIGH SENSITIVITY RESULT: 72 NG/L — CRITICAL HIGH
WBC # BLD: 4.58 K/UL — SIGNIFICANT CHANGE UP (ref 3.8–10.5)
WBC # FLD AUTO: 4.58 K/UL — SIGNIFICANT CHANGE UP (ref 3.8–10.5)

## 2024-10-14 PROCEDURE — 71045 X-RAY EXAM CHEST 1 VIEW: CPT | Mod: 26

## 2024-10-14 PROCEDURE — 99285 EMERGENCY DEPT VISIT HI MDM: CPT

## 2024-10-14 RX ORDER — ASPIRIN 325 MG
324 TABLET ORAL ONCE
Refills: 0 | Status: COMPLETED | OUTPATIENT
Start: 2024-10-14 | End: 2024-10-14

## 2024-10-14 RX ORDER — GLUCAGON INJECTION, SOLUTION 0.5 MG/.1ML
1 INJECTION, SOLUTION SUBCUTANEOUS ONCE
Refills: 0 | Status: DISCONTINUED | OUTPATIENT
Start: 2024-10-14 | End: 2024-10-15

## 2024-10-14 RX ORDER — INSULIN LISPRO 100/ML
VIAL (ML) SUBCUTANEOUS
Refills: 0 | Status: DISCONTINUED | OUTPATIENT
Start: 2024-10-14 | End: 2024-10-15

## 2024-10-14 RX ORDER — ALCOHOL ANTISEPTIC PADS
12.5 PADS, MEDICATED (EA) TOPICAL ONCE
Refills: 0 | Status: DISCONTINUED | OUTPATIENT
Start: 2024-10-14 | End: 2024-10-15

## 2024-10-14 RX ORDER — DAPAGLIFLOZIN 10 MG/1
10 TABLET, FILM COATED ORAL DAILY
Refills: 0 | Status: DISCONTINUED | OUTPATIENT
Start: 2024-10-14 | End: 2024-10-15

## 2024-10-14 RX ORDER — ALCOHOL ANTISEPTIC PADS
15 PADS, MEDICATED (EA) TOPICAL ONCE
Refills: 0 | Status: DISCONTINUED | OUTPATIENT
Start: 2024-10-14 | End: 2024-10-15

## 2024-10-14 RX ORDER — ASPIRIN 325 MG
81 TABLET ORAL DAILY
Refills: 0 | Status: DISCONTINUED | OUTPATIENT
Start: 2024-10-14 | End: 2024-10-15

## 2024-10-14 RX ORDER — SODIUM CHLORIDE IRRIG SOLUTION 0.9 %
1000 SOLUTION, IRRIGATION IRRIGATION
Refills: 0 | Status: DISCONTINUED | OUTPATIENT
Start: 2024-10-14 | End: 2024-10-15

## 2024-10-14 RX ORDER — INSULIN LISPRO 100/ML
VIAL (ML) SUBCUTANEOUS AT BEDTIME
Refills: 0 | Status: DISCONTINUED | OUTPATIENT
Start: 2024-10-14 | End: 2024-10-15

## 2024-10-14 RX ORDER — BUMETANIDE 2 MG/1
1 TABLET ORAL DAILY
Refills: 0 | Status: DISCONTINUED | OUTPATIENT
Start: 2024-10-14 | End: 2024-10-15

## 2024-10-14 RX ORDER — ALCOHOL ANTISEPTIC PADS
25 PADS, MEDICATED (EA) TOPICAL ONCE
Refills: 0 | Status: DISCONTINUED | OUTPATIENT
Start: 2024-10-14 | End: 2024-10-15

## 2024-10-14 RX ORDER — SACUBITRIL AND VALSARTAN 97; 103 MG/1; MG/1
1 TABLET, FILM COATED ORAL
Refills: 0 | DISCHARGE

## 2024-10-14 RX ORDER — METOPROLOL TARTRATE 50 MG
25 TABLET ORAL
Refills: 0 | Status: DISCONTINUED | OUTPATIENT
Start: 2024-10-14 | End: 2024-10-15

## 2024-10-14 RX ORDER — SACUBITRIL AND VALSARTAN 97; 103 MG/1; MG/1
1 TABLET, FILM COATED ORAL
Refills: 0 | Status: DISCONTINUED | OUTPATIENT
Start: 2024-10-14 | End: 2024-10-15

## 2024-10-14 NOTE — ED PROVIDER NOTE - OBJECTIVE STATEMENT
84-year-old female history of hypertension, CHF EF of 51%, CKD, diabetes, presenting with left-sided chest pain.  Patient reports that intermittently over the past week she has had a sharp left breast pain that is nonradiating lasts a few seconds on rest and exertion.  She went to her primary care provider today who advised her to come to the emergency department.  No fevers or cough no swelling of the extremities.

## 2024-10-14 NOTE — PATIENT PROFILE ADULT - FALL HARM RISK - HARM RISK INTERVENTIONS

## 2024-10-14 NOTE — H&P ADULT - NSICDXPASTSURGICALHX_GEN_ALL_CORE_FT
PAST SURGICAL HISTORY:  Cyst of breast, unspecified laterality bilateral excisions    H/O left breast biopsy     H/O left knee surgery     H/O: hysterectomy     History of total left hip replacement 2017, RIGHT HIP

## 2024-10-14 NOTE — ED PROVIDER NOTE - CLINICAL SUMMARY MEDICAL DECISION MAKING FREE TEXT BOX
84-year-old female history of HFpEF, hypertension, CKD, diabetes, presenting with left breast pain.  Not producible on exam.  No acute ischemic changes on EKG, stable. No tachypnea or hypoxia.  Will perform cardiac workup reassess.

## 2024-10-14 NOTE — PATIENT PROFILE ADULT - SURGICAL SITE INCISION
Medications sent to pharmacy  Change tooth brush  Return to clinic if symptoms worsen and as needed.      no

## 2024-10-14 NOTE — ED PROVIDER NOTE - PHYSICAL EXAMINATION
Physical Exam:  General: NAD, Conversive  Eyes: EOMI, Conjunctiva and sclera clear. Pupils equal and reactive  Neck: No JVD  Lungs: No respiratory distress, CTA  Heart: Normal peripheral perfusion  Abdomen: Soft, nontender, nondistended, no CVA tenderness  Extremities: 2+ peripheral pulses, no edema  Psych: AAO X3  Neurologic: Non-focal, ambulating, CN I-XII intact

## 2024-10-14 NOTE — H&P ADULT - HISTORY OF PRESENT ILLNESS
84-year-old female history of hypertension, Hypothyroidism  CHF EF of 51%, CKD, diabetes, presenting with left-sided chest pain.  Patient reports that intermittently over the past week she has had a sharp left breast pain that is nonradiating lasts a few seconds on rest and exertion.  She went to her primary care provider today who advised her to come to the emergency department.  No fevers or cough no swelling of the extremities. 84-year-old female history of hypertension, Hypothyroidism  CHF EF of 51%, CKD, diabetes, presenting with left-sided chest pain.  Patient reports that intermittently over the past week she has had a sharp left breast pain that is non radiating lasts a few seconds on rest and exertion.  She went to her primary care provider today who advised her to come to the emergency department.  No fevers or cough no swelling of the extremities.

## 2024-10-14 NOTE — ED ADULT NURSE REASSESSMENT NOTE - NS ED NURSE REASSESS COMMENT FT1
Break RN: Pt is A&Ox4, resting in stretcher with no complaints at this time. Respirations even and unlabored, chest rise equal b/l. NSR noted on cardiac monitor. VS as noted in flow sheets. Pt denies chest pain, SOB, chills, abdominal pain, N/V/D, h/a, dizziness, numbness/tingling or any urinary symptoms at this time. FS 94. Pt given PO juice. No acute distress noted. Report given to [facility] 1 [facility]ABS [facility]. Safety maintained throughout.

## 2024-10-14 NOTE — ED ADULT NURSE NOTE - OBJECTIVE STATEMENT
Pt received to room 25. Pt is a 84 year old female with Hx of DM2, HTN. Pt presents to ED via EMS from doctor's office. Pt c/o intermittent chest pain for a few months. denies SOB, headache, dizziness, abdominal pain, n/v, fevers/chills, numbness/tingling.   Pt is A&Ox4, ambulatory with a cane. airway patent, speaking clearly in full sentences. breathing is even and unlabored. spontaneous movement of all extremities. Placed on cardiac monitor - NSR. EKG in chart. VS as noted in flowsheet. right AC 20G IV placed, +blood return, flushes without difficulty. comfort measures provided. stretcher set in lowest position, call bell within reach, safety maintained.

## 2024-10-14 NOTE — ED ADULT NURSE NOTE - NSFALLRISK_ED_ALL_ED
Joint Reduction Procedure Note    Indication: Joint dislocation    Consent: The patient was counseled regarding the procedure, it's indications, risks, potential complications and alternatives and any questions were answered. Consent was obtained. Procedure: The pre-reduction exam showed distal perfusion & neurologic function to be normal. The patient was placed in the appropriate position. Anesthesia/pain control was offered but the patient refused. Reduction of the right short (pinkie) finger DIP joint was performed by traction and counter traction. Post reduction films were not obtained. A post-reduction exam revealed distal perfusion & neurologic function to be normal. The affected area was immobilized with an aluminum/ foam splint. The patient tolerated the procedure well.     Complications: None         Pooja Palomino [place]  07/01/22 [id number] Yes

## 2024-10-14 NOTE — ED PROVIDER NOTE - ATTENDING CONTRIBUTION TO CARE
GEN - NAD; well appearing; A+O x3   HEAD - NC/AT   EYES- PERRL, EOMI  ENT: Airway patent, mmm,  No inflammation, swelling, exudate, or lesions.    NECK: Neck supple  PULMONARY - CTA b/l, symmetric breath sounds.   CARDIAC -s1s2, RRR, no M,G,R, no chest wall ttp, no rash  ABDOMEN - +BS, ND, NT, soft, no guarding  BACK - no CVA tenderness, Normal  spine   EXTREMITIES - FROM, symmetric pulses, capillary refill < 2 seconds, no edema   SKIN - no rash or bruising   NEUROLOGIC - alert, speech clear, no focal deficits    agree with above hpi-patient sent in by md for intermittent left side cp nonradiating occuring over last few weeks to maybe months per her report-random in onset, not associated with exertion, nonpleuritic, is sharp and lasts few seconds to minutes and resolved. no associated diaphoresis, nausea, vomiting, fevers, cough, jaw pain, abd pain, diarrhea, dysuria, edema or recent travel. Seen for similar pain early in 2025. No pain now. EKG no actionable findings. plan for labs, cxr, monitoring, already took asa with ems, patient and grandaughter at bedside agreeable to plan.

## 2024-10-14 NOTE — H&P ADULT - TIME BILLING
Admission H&P including bedside history , examination , reviewing test results /old records and treatment plan. D/W patient , grand daughter and ACP.

## 2024-10-14 NOTE — ED ADULT NURSE NOTE - NSFALLHARMRISKINTERV_ED_ALL_ED

## 2024-10-14 NOTE — ED ADULT TRIAGE NOTE - CHIEF COMPLAINT QUOTE
pt brought in by EMS complaining of chest pain that is intermittent. pt denies SOB, N/V/D, fever or chills.

## 2024-10-14 NOTE — H&P ADULT - ASSESSMENT
84-year-old female history of hypertension, Hypothyroidism  CHF EF of 51%, CKD, diabetes, presenting with left-sided chest pain.  Patient reports that intermittently over the past week she has had a sharp left breast pain that is nonradiating lasts a few seconds on rest and exertion.  She went to her primary care provider today who advised her to come to the emergency department.  No fevers or cough no swelling of the extremities.

## 2024-10-15 ENCOUNTER — TRANSCRIPTION ENCOUNTER (OUTPATIENT)
Age: 84
End: 2024-10-15

## 2024-10-15 ENCOUNTER — RESULT REVIEW (OUTPATIENT)
Age: 84
End: 2024-10-15

## 2024-10-15 VITALS
RESPIRATION RATE: 17 BRPM | HEART RATE: 76 BPM | OXYGEN SATURATION: 98 % | DIASTOLIC BLOOD PRESSURE: 71 MMHG | SYSTOLIC BLOOD PRESSURE: 137 MMHG

## 2024-10-15 LAB
A1C WITH ESTIMATED AVERAGE GLUCOSE RESULT: 6.4 % — HIGH (ref 4–5.6)
ESTIMATED AVERAGE GLUCOSE: 137 — SIGNIFICANT CHANGE UP
GLUCOSE BLDC GLUCOMTR-MCNC: 124 MG/DL — HIGH (ref 70–99)
GLUCOSE BLDC GLUCOMTR-MCNC: 128 MG/DL — HIGH (ref 70–99)
GLUCOSE BLDC GLUCOMTR-MCNC: 136 MG/DL — HIGH (ref 70–99)

## 2024-10-15 PROCEDURE — 93356 MYOCRD STRAIN IMG SPCKL TRCK: CPT

## 2024-10-15 PROCEDURE — 93306 TTE W/DOPPLER COMPLETE: CPT | Mod: 26

## 2024-10-15 RX ORDER — METOPROLOL TARTRATE 50 MG
12.5 TABLET ORAL
Refills: 0 | Status: DISCONTINUED | OUTPATIENT
Start: 2024-10-15 | End: 2024-10-15

## 2024-10-15 RX ADMIN — Medication 25 MILLIGRAM(S): at 06:09

## 2024-10-15 RX ADMIN — SACUBITRIL AND VALSARTAN 1 TABLET(S): 97; 103 TABLET, FILM COATED ORAL at 06:09

## 2024-10-15 RX ADMIN — BUMETANIDE 1 MILLIGRAM(S): 2 TABLET ORAL at 06:08

## 2024-10-15 RX ADMIN — DAPAGLIFLOZIN 10 MILLIGRAM(S): 10 TABLET, FILM COATED ORAL at 10:49

## 2024-10-15 RX ADMIN — SACUBITRIL AND VALSARTAN 1 TABLET(S): 97; 103 TABLET, FILM COATED ORAL at 17:11

## 2024-10-15 RX ADMIN — Medication 75 MICROGRAM(S): at 06:08

## 2024-10-15 RX ADMIN — Medication 12.5 MILLIGRAM(S): at 17:11

## 2024-10-15 RX ADMIN — Medication 81 MILLIGRAM(S): at 10:49

## 2024-10-15 NOTE — DISCHARGE NOTE NURSING/CASE MANAGEMENT/SOCIAL WORK - NSDCPEFALRISK_GEN_ALL_CORE
For information on Fall & Injury Prevention, visit: [email or web address] OR  [email or web address] OR  https://www.cdc.gov/steadi/patient.html

## 2024-10-15 NOTE — DISCHARGE NOTE PROVIDER - NSDCMRMEDTOKEN_GEN_ALL_CORE_FT
aspirin 81 mg oral tablet: 1 tab(s) orally once a day  Bumex 1 mg oral tablet: 1 tab(s) orally once a day  donepezil 10 mg oral tablet: 1 tab(s) orally once a day (at bedtime)  Entresto 49 mg-51 mg oral tablet: 1 tab(s) orally 2 times a day  Farxiga 10 mg oral tablet: 1 tab(s) orally once a day  Januvia 100 mg oral tablet: 1 tab(s) orally once a day  levothyroxine 75 mcg (0.075 mg) oral tablet: 1 tab(s) orally once a day  metFORMIN 500 mg oral tablet: 1 tab(s) orally once a day  Metoprolol Tartrate 25 mg oral tablet: 0.5 tab(s) orally 2 times a day  nitroglycerin 0.4 mg sublingual tablet: 1 tab(s) sublingually every 5 minutes as needed for  chest pain Up to 3 tabs a day.

## 2024-10-15 NOTE — DISCHARGE NOTE PROVIDER - NSDCCPCAREPLAN_GEN_ALL_CORE_FT
PRINCIPAL DISCHARGE DIAGNOSIS  Diagnosis: Chest pain  Assessment and Plan of Treatment: You were seen in the hospital for chest pain. You had a CXR which was normal. You heart enzymes were mildly elevated and you underwent an echocardiogram which was negative for acute changes or pericardial effusions. You were evaluated by Cardiology and are cleared for discharge home. Please continue your medication regimen and follow up with your PCP in one week. If you develop new or worsening chest pain, trouble breathing, lightheadedness, nausea or any concerning symptom then please return to ER.      SECONDARY DISCHARGE DIAGNOSES  Diagnosis: DM (diabetes mellitus)  Assessment and Plan of Treatment: Your HgA1C during hospitalization was noted to be 6.4% (Provide such information to your primary care).  Continue your medication regimen and a consistent carbohydrate diet (Meaning eating the same amount of carbohydrates at the same time each day). Monitor blood glucose levels throughout the day before meals and at bedtime. Record blood sugars and bring to outpatient providers appointment in order to be reviewed by your doctor for management modifications. If your sugars are more than 400 or less than 70 you should contact your PCP immediately.   Monitor for signs/symptoms of low blood glucose, such as, dizziness, altered mental status, or cool/clammy skin. In addition, monitor for signs/symptoms of high blood glucose, such as, feeling hot, dry, fatigued, or with increased thirst/urination.   Make regular podiatry appointments in order to have feet checked for wounds and uncontrolled toe nail growth to prevent infections, as well as, appointments with an ophthalmologist to monitor your vision.      Diagnosis: CHF with cardiomyopathy  Assessment and Plan of Treatment: Continue recommended medication regimen, fluid restriction (Less than 1.5 Liters per day). Monitor for signs/symptoms of fluid overload and electrolyte abnormalities, such as, shortness of breath, cough, swelling, chest discomfort, changes in heart rate, dizziness, fainting, or changes in mental status. Keep track of your weight and call your outpatient physician if there are abrupt changes in weight.   Follow-up with your outpatient provider after you've been discharged from the hospital for further care/recommendations.      Diagnosis: Hypothyroidism  Assessment and Plan of Treatment: Continue your thyroid medications as recommended and follow-up with your outpatient provider for continual thyroid function testing within 4-6 weeks for routine labs.

## 2024-10-15 NOTE — PROGRESS NOTE ADULT - ASSESSMENT
84-year-old female history of hypertension, Hypothyroidism  CHF EF of 51%, CKD, diabetes, presenting with left-sided chest pain.  Patient reports that intermittently over the past week she has had a sharp left breast pain that is nonradiating lasts a few seconds on rest and exertion.  She went to her primary care provider today who advised her to come to the emergency department.  No fevers or cough no swelling of the extremities.       Problem/Plan - 1:  ·  Problem: Recurrent chest pain.   ·  Plan: Now completely chest pain free.   Tropx 2 elevated.  Cardiology to follow.  TTE results pending.      Problem/Plan - 2:  ·  Problem: DM (diabetes mellitus).   ·  Plan: Holding PO medication.  SSI in patient .     Problem/Plan - 3:  ·  Problem: CHF with cardiomyopathy.   ·  Plan: Continuing Entresto, Farxiga  & Bumex.     Problem/Plan - 4:  ·  Problem: HTN (hypertension).   ·  Plan: BP medications with hold parameters.     Problem/Plan - 5:  ·  Problem: Stage 3 chronic kidney disease.   ·  Plan: Trending creatinine .     Problem/Plan - 6:  ·  Problem: Hypothyroidism.   ·  Plan: Continuing home dose synthroid.    Dispo : Dc planning home.

## 2024-10-15 NOTE — PHYSICAL THERAPY INITIAL EVALUATION ADULT - GAIT DISTANCE, PT EVAL
75 feet one instance of lateral sway, able to correct -pt's granddtr states this is her baseline ; no c/o chest pain/75 feet

## 2024-10-15 NOTE — CONSULT NOTE ADULT - ASSESSMENT
84 year old female, with past history of HTN, CHF, Hypothyroidism, OA, Obesity and Type-2 DM, presented to the ED For CP. Nephrology consulted for CKD    A/P   CKD 3a:  Etiology likely HTN T2DM  SCr at baseline 1.3  renal diet  avoid nephrotoxins   Keep MAP>65  Recent A1c show 6.4 well controlled    HTN:   BP fluctuating   continue home meds  Monitor BP     Chest pain:  work up per cardio

## 2024-10-15 NOTE — DISCHARGE NOTE PROVIDER - CARE PROVIDER_API CALL
[facility]  [address] [place] [address]  Phone: [phone]  Fax: [phone]  Established Patient  Follow Up Time: 1 week

## 2024-10-15 NOTE — DISCHARGE NOTE PROVIDER - HOSPITAL COURSE
84-year-old female history of hypertension, Hypothyroidism  CHF EF of 51%, CKD, diabetes, presenting with left-sided chest pain.  Patient reports that intermittently over the past week she has had a sharp left breast pain that is nonradiating lasts a few seconds on rest and exertion.  She went to her primary care provider today who advised her to come to the emergency department.  No fevers or cough no swelling of the extremities.    Hospital Course:    Recurrent chest pain.   - Trop x 2 elevated at 71 -> 72  - CXR clear lungs  - Echo: LV Fxn 55 to 60 %, mod LVH, normal RV fxn, mild LA dilation, no pericardial effusion, severe LV diastolic dysfunction   - Patient now completely chest pain free   - Patient cleared by Cardiology for discharge     DM (diabetes mellitus).   - Holding PO medication.   - SSI in patient   - Resume PO meds on discharge    CHF with cardiomyopathy.   - Continuing Entresto, Farxiga  & Bumex  - Outpatient follow up     HTN (hypertension).   - BP medications with hold parameters.    Stage 3 chronic kidney disease.   - Trending creatinine   - Nephrology called, Cr at baseline     Hypothyroidism.   Continuing home dose synthroid.    On 10/15/24 this case was reviewed with Dr. Aguayo, the patient is medically stable and optimized for discharge. All medications were reviewed with Attending Physician and prescriptions were sent to mutually agreed upon pharmacy.

## 2024-10-15 NOTE — DISCHARGE NOTE NURSING/CASE MANAGEMENT/SOCIAL WORK - PATIENT PORTAL LINK FT
You can access the FollowMyHealth Patient Portal offered by [facility] by registering at the following website: [email or web address]. By joining [facility]’s FollowMyHealth portal, you will also be able to view your health information using other applications (apps) compatible with our system.

## 2024-10-15 NOTE — CONSULT NOTE ADULT - SUBJECTIVE AND OBJECTIVE BOX
[facility]    Dr. Elen Martel     Office [phone] (9 am to 5 pm)  Service : [phone] ( 5pm to 9 am)          RENAL INITIAL CONSULT NOTE: DATE OF SERVICE 10-15-24 @ 11:54    HPI:  84-year-old female history of hypertension, Hypothyroidism  CHF EF of 51%, CKD, diabetes, presenting with left-sided chest pain.  Patient reports that intermittently over the past week she has had a sharp left breast pain that is non radiating lasts a few seconds on rest and exertion.  She went to her primary care provider today who advised her to come to the emergency department.  No fevers or cough no swelling of the extremities. (14 Oct 2024 18:04)    Nephrology consulted for CKD    Allergies:  No Known Allergies      PAST MEDICAL & SURGICAL HISTORY:  Obesity, unspecified obesity severity, unspecified obesity type      Essential hypertension      Type 2 diabetes mellitus with complication, without long-term current use of insulin      History of congestive heart disease  as per patient she had chf diagnosed in 2017      Carpal tunnel syndrome  bilateral      Hypothyroidism      OA (osteoarthritis)      H/O: hysterectomy      Cyst of breast, unspecified laterality  bilateral excisions      History of total left hip replacement  2017, RIGHT HIP      H/O left knee surgery      H/O left breast biopsy          Home Medications Reviewed    Hospital Medications:   MEDICATIONS  (STANDING):  aspirin enteric coated 81 milliGRAM(s) Oral daily  buMETAnide 1 milliGRAM(s) Oral daily  dapagliflozin 10 milliGRAM(s) Oral daily  dextrose 5%. 1000 milliLiter(s) (50 mL/Hr) IV Continuous <Continuous>  dextrose 5%. 1000 milliLiter(s) (100 mL/Hr) IV Continuous <Continuous>  dextrose 50% Injectable 25 Gram(s) IV Push once  dextrose 50% Injectable 25 Gram(s) IV Push once  dextrose 50% Injectable 12.5 Gram(s) IV Push once  glucagon  Injectable 1 milliGRAM(s) IntraMuscular once  insulin lispro (ADMELOG) corrective regimen sliding scale   SubCutaneous three times a day before meals  insulin lispro (ADMELOG) corrective regimen sliding scale   SubCutaneous at bedtime  levothyroxine 75 MICROGram(s) Oral daily  metoprolol tartrate 12.5 milliGRAM(s) Oral two times a day  sacubitril 49 mG/valsartan 51 mG 1 Tablet(s) Oral two times a day      SOCIAL HISTORY:  Denies ETOh, Smoking,     FAMILY HISTORY:  Family history of colon cancer (Sibling)    Family history of heart disease (Sibling)        REVIEW OF SYSTEMS:  CONSTITUTIONAL: No weakness, fevers or chills  EYES/ENT: No visual changes;  No vertigo or throat pain   NECK: No pain or stiffness  RESPIRATORY: No cough, wheezing, hemoptysis; No shortness of breath  CARDIOVASCULAR: No chest pain or palpitations.  GASTROINTESTINAL: No abdominal or epigastric pain. No nausea, vomiting, or hematemesis; No diarrhea or constipation. No melena or hematochezia.  GENITOURINARY: No dysuria, frequency, foamy urine, urinary urgency, incontinence or hematuria  NEUROLOGICAL: No numbness or weakness  SKIN: No itching, burning, rashes, or lesions   VASCULAR: No bilateral lower extremity edema.   All other review of systems is negative unless indicated above.    VITALS:  T(F): 98.2 (10-15-24 @ 09:37), Max: 98.2 (10-15-24 @ 09:37)  HR: 64 (10-15-24 @ 09:37)  BP: 113/61 (10-15-24 @ 09:37)  RR: 17 (10-15-24 @ 09:37)  SpO2: 100% (10-15-24 @ 09:37)  Wt(kg): --        PHYSICAL EXAM:  Constitutional: NAD  HEENT: anicteric sclera, oropharynx clear, MMM  Neck: No JVD  Respiratory: CTAB, no wheezes, rales or rhonchi  Cardiovascular: S1, S2, RRR  Gastrointestinal: BS+, soft, NT/ND  Extremities: No cyanosis or clubbing. No peripheral edema  Neurological: A/O x 3, no focal deficits  Psychiatric: Normal mood, normal affect  : No CVA tenderness. No pedroza.   Skin: No rashes  Vascular Access:    LABS:  10-14    137  |  100  |  31[H]  ----------------------------<  88  4.2   |  25  |  1.22    Ca    9.9      14 Oct 2024 12:58  Mg     2.10     10-14    TPro  7.4  /  Alb  3.9  /  TBili  0.5  /  DBili      /  AST  27  /  ALT  15  /  AlkPhos  82  10-14    Creatinine Trend: 1.22 <--                        14.5   4.58  )-----------( 197      ( 14 Oct 2024 12:58 )             45.5     Urine Studies:  Urinalysis Basic - ( 14 Oct 2024 12:58 )    Color:  / Appearance:  / SG:  / pH:   Gluc: 88 mg/dL / Ketone:   / Bili:  / Urobili:    Blood:  / Protein:  / Nitrite:    Leuk Esterase:  / RBC:  / WBC    Sq Epi:  / Non Sq Epi:  / Bacteria:           RADIOLOGY & ADDITIONAL STUDIES:

## 2024-10-15 NOTE — CONSULT NOTE ADULT - SUBJECTIVE AND OBJECTIVE BOX
Cardiovascular Disease Initial Evaluation  DATE OF SERVICE: 10-15-24 @ 08:28    CHIEF COMPLAINT: Chest pain    HISTORY OF PRESENT ILLNESS:    This is an 84 year old woman with hypertension, Hypothyroidism, CKD III and compensated diastolic CHF who presented to [facility] on 10/14/2024 with chest pain. The patient reports that intermittently over the past week she has had a sharp left breast pain that is non radiating lasts a few seconds. The symptoms are worse when she lays down, but are not present when she walks with her cane.  She went to her primary care provider who advised her to come to the emergency department.  No fevers or cough no swelling of the extremities.    Allergies  No Known Allergies      MEDICATIONS:  aspirin enteric coated 81 milliGRAM(s) Oral daily  buMETAnide 1 milliGRAM(s) Oral daily  metoprolol tartrate 25 milliGRAM(s) Oral two times a day  nitroglycerin     SubLingual 0.4 milliGRAM(s) SubLingual every 5 minutes PRN  sacubitril 49 mG/valsartan 51 mG 1 Tablet(s) Oral two times a day            dapagliflozin 10 milliGRAM(s) Oral daily  dextrose 50% Injectable 25 Gram(s) IV Push once  dextrose 50% Injectable 12.5 Gram(s) IV Push once  dextrose 50% Injectable 25 Gram(s) IV Push once  dextrose Oral Gel 15 Gram(s) Oral once PRN  glucagon  Injectable 1 milliGRAM(s) IntraMuscular once  insulin lispro (ADMELOG) corrective regimen sliding scale   SubCutaneous at bedtime  insulin lispro (ADMELOG) corrective regimen sliding scale   SubCutaneous three times a day before meals  levothyroxine 75 MICROGram(s) Oral daily    dextrose 5%. 1000 milliLiter(s) IV Continuous <Continuous>  dextrose 5%. 1000 milliLiter(s) IV Continuous <Continuous>      PAST MEDICAL & SURGICAL HISTORY:  Obesity, unspecified obesity severity, unspecified obesity type      Essential hypertension      Type 2 diabetes mellitus with complication, without long-term current use of insulin      History of congestive heart disease  as per patient she had chf diagnosed in 2017      Carpal tunnel syndrome  bilateral      Hypothyroidism      OA (osteoarthritis)      H/O: hysterectomy      Cyst of breast, unspecified laterality  bilateral excisions      History of total left hip replacement  2017, RIGHT HIP      H/O left knee surgery      H/O left breast biopsy          FAMILY HISTORY:  Family history of colon cancer (Sibling)    Family history of heart disease (Sibling)        SOCIAL HISTORY:    The patient is a nonsmoker       REVIEW OF SYSTEMS:  See HPI, otherwise complete 14 point review of systems negative      PHYSICAL EXAM:  T(C): 36.7 (10-15-24 @ 06:05), Max: 36.7 (10-15-24 @ 01:02)  HR: 76 (10-15-24 @ 06:05) (60 - 76)  BP: 134/80 (10-15-24 @ 06:05) (108/64 - 134/80)  RR: 20 (10-15-24 @ 06:05) (17 - 20)  SpO2: 100% (10-15-24 @ 06:05) (97% - 100%)  Wt(kg): --  I&O's Summary      Appearance: No Acute Distress; resting comfortably  HEENT:  Normal oral mucosa, PERRL, EOMI	  Cardiovascular: Normal S1 S2, No JVD, No murmurs/rubs/gallops  Respiratory: Normal respiratory effort; Lungs clear to auscultation bilaterally  Gastrointestinal:  Soft, Non-tender, + BS	  Skin: No rashes, No ecchymoses, No cyanosis	  Neurologic: Non-focal; no weakness  Extremities: No clubbing, cyanosis or edema  Vascular: Peripheral pulses palpable 2+ bilaterally  Psychiatry: Alert, Mood & affect appropriate    Laboratory Data:	 	    CBC Full  -  ( 14 Oct 2024 12:58 )  WBC Count : 4.58 K/uL  Hemoglobin : 14.5 g/dL  Hematocrit : 45.5 %  Platelet Count - Automated : 197 K/uL  Mean Cell Volume : 74.2 fL  Mean Cell Hemoglobin : 23.7 pg  Mean Cell Hemoglobin Concentration : 31.9 gm/dL  Auto Neutrophil # : 2.10 K/uL  Auto Lymphocyte # : 1.78 K/uL  Auto Monocyte # : 0.41 K/uL  Auto Eosinophil # : 0.25 K/uL  Auto Basophil # : 0.03 K/uL  Auto Neutrophil % : 45.7 %  Auto Lymphocyte % : 38.9 %  Auto Monocyte % : 9.0 %  Auto Eosinophil % : 5.5 %  Auto Basophil % : 0.7 %    10-14    137  |  100  |  31[H]  ----------------------------<  88  4.2   |  25  |  1.22    Ca    9.9      14 Oct 2024 12:58  Mg     2.10     10-14    TPro  7.4  /  Alb  3.9  /  TBili  0.5  /  DBili  x   /  AST  27  /  ALT  15  /  AlkPhos  82  10-14        Interpretation of Telemetry: n/a	    ECG:  	n/a    Assessment: 84 year old woman with HTN, Hypothyroidism, OA, Obesity and Type-2 DM presents with chest pain.     Plan of Care:    #Chest pain-  Mrs. Melchor reports sharp pain present when she lays down.  There is no pain when she ambulates with her cane.  Pain is atypical for angina and her EKG is nonischemic.   Elevated troponins are likely secondary to CKD III status.  Obtain echo to assess for pericardial effusion.  No plan for ischemic work up given atypical symptoms.        #HTN-  BP acceptable.    #Compensated diastolic CHF-  Associated with group II severe pHTN.  Patient is not fluid overloaded.  Home dose Bumex resumed.       76 minutes spent on total encounter; more than 50% of the visit was spent counseling and/or coordinating care by the attending physician.   	  Boby Browne MD [facility]  Cardiovascular Diseases  [phone]

## 2024-10-15 NOTE — PROGRESS NOTE ADULT - SUBJECTIVE AND OBJECTIVE BOX
Date of Service  : 10-15-24     INTERVAL HPI/OVERNIGHT EVENTS: I feel fine so want to go home.   Vital Signs Last 24 Hrs  T(C): 36.7 (15 Oct 2024 13:23), Max: 36.8 (15 Oct 2024 09:37)  T(F): 98.1 (15 Oct 2024 13:23), Max: 98.2 (15 Oct 2024 09:37)  HR: 72 (15 Oct 2024 13:23) (60 - 76)  BP: 110/61 (15 Oct 2024 13:23) (108/64 - 134/80)  BP(mean): 94 (14 Oct 2024 18:04) (94 - 94)  RR: 17 (15 Oct 2024 13:23) (17 - 20)  SpO2: 98% (15 Oct 2024 13:23) (98% - 100%)    Parameters below as of 15 Oct 2024 13:23  Patient On (Oxygen Delivery Method): room air      I&O's Summary    MEDICATIONS  (STANDING):  aspirin enteric coated 81 milliGRAM(s) Oral daily  buMETAnide 1 milliGRAM(s) Oral daily  dapagliflozin 10 milliGRAM(s) Oral daily  dextrose 5%. 1000 milliLiter(s) (50 mL/Hr) IV Continuous <Continuous>  dextrose 5%. 1000 milliLiter(s) (100 mL/Hr) IV Continuous <Continuous>  dextrose 50% Injectable 25 Gram(s) IV Push once  dextrose 50% Injectable 12.5 Gram(s) IV Push once  dextrose 50% Injectable 25 Gram(s) IV Push once  glucagon  Injectable 1 milliGRAM(s) IntraMuscular once  insulin lispro (ADMELOG) corrective regimen sliding scale   SubCutaneous at bedtime  insulin lispro (ADMELOG) corrective regimen sliding scale   SubCutaneous three times a day before meals  levothyroxine 75 MICROGram(s) Oral daily  metoprolol tartrate 12.5 milliGRAM(s) Oral two times a day  sacubitril 49 mG/valsartan 51 mG 1 Tablet(s) Oral two times a day    MEDICATIONS  (PRN):  dextrose Oral Gel 15 Gram(s) Oral once PRN Blood Glucose LESS THAN 70 milliGRAM(s)/deciliter  nitroglycerin     SubLingual 0.4 milliGRAM(s) SubLingual every 5 minutes PRN Chest Pain    LABS:                        14.5   4.58  )-----------( 197      ( 14 Oct 2024 12:58 )             45.5     10-14    137  |  100  |  31[H]  ----------------------------<  88  4.2   |  25  |  1.22    Ca    9.9      14 Oct 2024 12:58  Mg     2.10     10-14    TPro  7.4  /  Alb  3.9  /  TBili  0.5  /  DBili  x   /  AST  27  /  ALT  15  /  AlkPhos  82  10-14      Urinalysis Basic - ( 14 Oct 2024 12:58 )    Color: x / Appearance: x / SG: x / pH: x  Gluc: 88 mg/dL / Ketone: x  / Bili: x / Urobili: x   Blood: x / Protein: x / Nitrite: x   Leuk Esterase: x / RBC: x / WBC x   Sq Epi: x / Non Sq Epi: x / Bacteria: x      CAPILLARY BLOOD GLUCOSE      POCT Blood Glucose.: 124 mg/dL (15 Oct 2024 12:28)  POCT Blood Glucose.: 136 mg/dL (15 Oct 2024 08:49)  POCT Blood Glucose.: 123 mg/dL (14 Oct 2024 21:40)  POCT Blood Glucose.: 94 mg/dL (14 Oct 2024 17:52)        Urinalysis Basic - ( 14 Oct 2024 12:58 )    Color: x / Appearance: x / SG: x / pH: x  Gluc: 88 mg/dL / Ketone: x  / Bili: x / Urobili: x   Blood: x / Protein: x / Nitrite: x   Leuk Esterase: x / RBC: x / WBC x   Sq Epi: x / Non Sq Epi: x / Bacteria: x      REVIEW OF SYSTEMS:  CONSTITUTIONAL: No fever, weight loss, or fatigue  EYES: No eye pain, visual disturbances, or discharge  ENMT:  No difficulty hearing, tinnitus, vertigo; No sinus or throat pain  NECK: No pain or stiffness  RESPIRATORY: No cough, wheezing, chills or hemoptysis; No shortness of breath  CARDIOVASCULAR: No chest pain, palpitations, dizziness, or leg swelling  GASTROINTESTINAL: No abdominal or epigastric pain. No nausea, vomiting, or hematemesis; No diarrhea or constipation. No melena or hematochezia.  GENITOURINARY: No dysuria, frequency, hematuria, or incontinence  NEUROLOGICAL: No headaches, memory loss, loss of strength, numbness, or tremors      Consultant(s) Notes Reviewed:  [x ] YES  [ ] NO    PHYSICAL EXAM:  GENERAL: NAD, well-groomed, well-developed,not in any distress ,  HEAD:  Atraumatic, Normocephalic  NECK: Supple, No JVD, Normal thyroid  NERVOUS SYSTEM:  Alert & Oriented X3, No focal deficit   CHEST/LUNG: Good air entry bilateral with no  rales, rhonchi, wheezing, or rubs  HEART: Regular rate and rhythm; No murmurs, rubs, or gallops  ABDOMEN: Soft, Nontender, Nondistended; Bowel sounds present  EXTREMITIES:  2+ Peripheral Pulses, No clubbing, cyanosis, or edema    Care Discussed with Consultants/Other Providers [ x] YES  [ ] NO

## 2024-10-15 NOTE — PHYSICAL THERAPY INITIAL EVALUATION ADULT - PERTINENT HX OF CURRENT PROBLEM, REHAB EVAL
84-year-old female history of hypertension, Hypothyroidism  CHF EF of 51%, CKD, diabetes, presenting with left-sided chest pain.  Patient reports that intermittently over the past week she has had a sharp left breast pain that is nonradiating lasts a few seconds on rest and exertion.  She went to her primary care provider today who advised her to come to the emergency department.  No fevers or cough no swelling of the extremities. 84-year-old female history of hypertension, Hypothyroidism  CHF EF of 51%, CKD, diabetes, presented with left-sided chest pain.  Patient reports that intermittently over the past week she has had a sharp left breast pain that is nonradiating lasts a few seconds on rest and exertion.  She went to her primary care provider today who advised her to come to the emergency department.

## 2024-10-15 NOTE — PHYSICAL THERAPY INITIAL EVALUATION ADULT - LIVES WITH, PROFILE
22 y/o foster child in a apartment with 3 flights up with handrail, pt has CDPAP services 4 hours / 7 days

## 2024-11-20 NOTE — H&P ADULT - PROBLEM SELECTOR PROBLEM 5
PRINCIPAL DISCHARGE DIAGNOSIS  Diagnosis: Unstable angina  Assessment and Plan of Treatment:   HOME CARE INSTRUCTIONS  For the next few days, avoid physical activities that bring on chest pain. Continue physical activities as directed.  Do not smoke.  Avoid drinking alcohol.   Only take over-the-counter or prescription medicine for pain, discomfort, or fever as directed by your caregiver.  Follow your caregiver's suggestions for further testing if your chest pain does not go away.  Keep any follow-up appointments you made. If you do not go to an appointment, you could develop lasting (chronic) problems with pain. If there is any problem keeping an appointment, you must call to reschedule.   SEEK MEDICAL CARE IF:  You think you are having problems from the medicine you are taking. Read your medicine instructions carefully.  Your chest pain does not go away, even after treatment.  You develop a rash with blisters on your chest.  SEEK IMMEDIATE MEDICAL CARE IF:  You have increased chest pain or pain that spreads to your arm, neck, jaw, back, or abdomen.   You develop shortness of breath, an increasing cough, or you are coughing up blood.  You have severe back or abdominal pain, feel nauseous, or vomit.  You develop severe weakness, fainting, or chills.  You have a fever.  THIS IS AN EMERGENCY. Do not wait to see if the pain will go away. Get medical help at once. Call your local emergency services (_____________________). Do not drive yourself to the hospital.      SECONDARY DISCHARGE DIAGNOSES  Diagnosis: HLD (hyperlipidemia)  Assessment and Plan of Treatment: Continue all medications as ordered    Diagnosis: Type 2 diabetes mellitus  Assessment and Plan of Treatment: HgA1C this admission.  Make sure you get your HgA1c checked every three months.  If you take oral diabetes medications, check your blood glucose two times a day.  If you take insulin, check your blood glucose before meals and at bedtime.  It's important not to skip any meals.  Keep a log of your blood glucose results and always take it with you to your doctor appointments.  Keep a list of your current medications including injectables and over the counter medications and bring this medication list with you to all your doctor appointments.  If you have not seen your ophthalmologist this year call for appointment.  Check your feet daily for redness, sores, or openings. Do not self treat. If no improvement in two days call your primary care physician for an appointment.  Low blood sugar (hypoglycemia) is a blood sugar below 70mg/dl. Check your blood sugar if you feel signs/symptoms of hypoglycemia. If your blood sugar is below 70 take 15 grams of carbohydrates (ex 4 oz of apple juice, 3-4 glucose tablets, or 4-6 oz of regular soda) wait 15 minutes and repeat blood sugar to make sure it comes up above 70.  If your blood sugar is above 70 and you are due for a meal, have a meal.  If you are not due for a meal have a snack.  This snack helps keeps your blood sugar at a safe range.    Diagnosis: Complete heart block  Assessment and Plan of Treatment: s/p Pacemaker    Diagnosis: HTN (hypertension)  Assessment and Plan of Treatment: Low salt diet  Activity as tolerated.  Take all medication as prescribed.  Follow up with your medical doctor for routine blood pressure monitoring at your next visit.  Notify your doctor if you have any of the following symptoms:   Dizziness, Lightheadedness, Blurry vision, Headache, Chest pain, Shortness of breath    Diagnosis: Unstable angina  Assessment and Plan of Treatment:      Hypothyroidism PRINCIPAL DISCHARGE DIAGNOSIS  Diagnosis: Unstable angina  Assessment and Plan of Treatment: HOME CARE INSTRUCTIONS  For the next few days, avoid physical activities that bring on chest pain. Continue physical activities as directed.  Do not smoke.  Avoid drinking alcohol.   Only take over-the-counter or prescription medicine for pain, discomfort, or fever as directed by your caregiver.  Follow your caregiver's suggestions for further testing if your chest pain does not go away.  Keep any follow-up appointments you made. If you do not go to an appointment, you could develop lasting (chronic) problems with pain. If there is any problem keeping an appointment, you must call to reschedule.   SEEK MEDICAL CARE IF:  You think you are having problems from the medicine you are taking. Read your medicine instructions carefully.  Your chest pain does not go away, even after treatment.  You develop a rash with blisters on your chest.  SEEK IMMEDIATE MEDICAL CARE IF:  You have increased chest pain or pain that spreads to your arm, neck, jaw, back, or abdomen.   You develop shortness of breath, an increasing cough, or you are coughing up blood.  You have severe back or abdominal pain, feel nauseous, or vomit.  You develop severe weakness, fainting, or chills.  You have a fever.      SECONDARY DISCHARGE DIAGNOSES  Diagnosis: HTN (hypertension)  Assessment and Plan of Treatment: Low salt diet  Activity as tolerated.  Take all medication as prescribed.  Follow up with your medical doctor for routine blood pressure monitoring at your next visit.  Notify your doctor if you have any of the following symptoms:   Dizziness, Lightheadedness, Blurry vision, Headache, Chest pain, Shortness of breath    Diagnosis: HLD (hyperlipidemia)  Assessment and Plan of Treatment: Continue all medications as ordered    Diagnosis: Type 2 diabetes mellitus  Assessment and Plan of Treatment: HgA1C this admission.  Make sure you get your HgA1c checked every three months.  If you take oral diabetes medications, check your blood glucose two times a day.  If you take insulin, check your blood glucose before meals and at bedtime.  It's important not to skip any meals.  Keep a log of your blood glucose results and always take it with you to your doctor appointments.  Keep a list of your current medications including injectables and over the counter medications and bring this medication list with you to all your doctor appointments.  If you have not seen your ophthalmologist this year call for appointment.  Check your feet daily for redness, sores, or openings. Do not self treat. If no improvement in two days call your primary care physician for an appointment.  Low blood sugar (hypoglycemia) is a blood sugar below 70mg/dl. Check your blood sugar if you feel signs/symptoms of hypoglycemia. If your blood sugar is below 70 take 15 grams of carbohydrates (ex 4 oz of apple juice, 3-4 glucose tablets, or 4-6 oz of regular soda) wait 15 minutes and repeat blood sugar to make sure it comes up above 70.  If your blood sugar is above 70 and you are due for a meal, have a meal.  If you are not due for a meal have a snack.  This snack helps keeps your blood sugar at a safe range.    Diagnosis: Complete heart block  Assessment and Plan of Treatment: s/p Pacemaker    Diagnosis: Unstable angina  Assessment and Plan of Treatment:      PRINCIPAL DISCHARGE DIAGNOSIS  Diagnosis: Unstable angina  Assessment and Plan of Treatment: HOME CARE INSTRUCTIONS  For the next few days, avoid physical activities that bring on chest pain. Continue physical activities as directed.  Do not smoke.  Avoid drinking alcohol.   Only take over-the-counter or prescription medicine for pain, discomfort, or fever as directed by your caregiver.  Follow your caregiver's suggestions for further testing if your chest pain does not go away.  Keep any follow-up appointments you made. If you do not go to an appointment, you could develop lasting (chronic) problems with pain. If there is any problem keeping an appointment, you must call to reschedule.   SEEK MEDICAL CARE IF:  You think you are having problems from the medicine you are taking. Read your medicine instructions carefully.  Your chest pain does not go away, even after treatment.  You develop a rash with blisters on your chest.  SEEK IMMEDIATE MEDICAL CARE IF:  You have increased chest pain or pain that spreads to your arm, neck, jaw, back, or abdomen.   You develop shortness of breath, an increasing cough, or you are coughing up blood.  You have severe back or abdominal pain, feel nauseous, or vomit.  You develop severe weakness, fainting, or chills.  You have a fever.      SECONDARY DISCHARGE DIAGNOSES  Diagnosis: HTN (hypertension)  Assessment and Plan of Treatment: Low salt diet  Activity as tolerated.  Take all medication as prescribed.  Follow up with your medical doctor for routine blood pressure monitoring at your next visit.  Notify your doctor if you have any of the following symptoms:   Dizziness, Lightheadedness, Blurry vision, Headache, Chest pain, Shortness of breath    Diagnosis: HLD (hyperlipidemia)  Assessment and Plan of Treatment: Continue all medications as ordered    Diagnosis: Type 2 diabetes mellitus  Assessment and Plan of Treatment: Tresiba 24 units at bedtime  Novolog 12 units three times a day before meals  Trulicity 0.75mg once a week.   HgA1C this admission.  Make sure you get your HgA1c checked every three months.  If you take oral diabetes medications, check your blood glucose two times a day.  If you take insulin, check your blood glucose before meals and at bedtime.  It's important not to skip any meals.  Keep a log of your blood glucose results and always take it with you to your doctor appointments.  Keep a list of your current medications including injectables and over the counter medications and bring this medication list with you to all your doctor appointments.  If you have not seen your ophthalmologist this year call for appointment.  Check your feet daily for redness, sores, or openings. Do not self treat. If no improvement in two days call your primary care physician for an appointment.  Low blood sugar (hypoglycemia) is a blood sugar below 70mg/dl. Check your blood sugar if you feel signs/symptoms of hypoglycemia. If your blood sugar is below 70 take 15 grams of carbohydrates (ex 4 oz of apple juice, 3-4 glucose tablets, or 4-6 oz of regular soda) wait 15 minutes and repeat blood sugar to make sure it comes up above 70.  If your blood sugar is above 70 and you are due for a meal, have a meal.  If you are not due for a meal have a snack.  This snack helps keeps your blood sugar at a safe range.    Diagnosis: Complete heart block  Assessment and Plan of Treatment: s/p Pacemaker    Diagnosis: Unstable angina  Assessment and Plan of Treatment:      PRINCIPAL DISCHARGE DIAGNOSIS  Diagnosis: Unstable angina  Assessment and Plan of Treatment: HOME CARE INSTRUCTIONS  For the next few days, avoid physical activities that bring on chest pain. Continue physical activities as directed.  Do not smoke.  Avoid drinking alcohol.   Only take over-the-counter or prescription medicine for pain, discomfort, or fever as directed by your caregiver.  Follow your caregiver's suggestions for further testing if your chest pain does not go away.  Keep any follow-up appointments you made. If you do not go to an appointment, you could develop lasting (chronic) problems with pain. If there is any problem keeping an appointment, you must call to reschedule.   SEEK MEDICAL CARE IF:  You think you are having problems from the medicine you are taking. Read your medicine instructions carefully.  Your chest pain does not go away, even after treatment.  You develop a rash with blisters on your chest.  SEEK IMMEDIATE MEDICAL CARE IF:  You have increased chest pain or pain that spreads to your arm, neck, jaw, back, or abdomen.   You develop shortness of breath, an increasing cough, or you are coughing up blood.  You have severe back or abdominal pain, feel nauseous, or vomit.  You develop severe weakness, fainting, or chills.  You have a fever.      SECONDARY DISCHARGE DIAGNOSES  Diagnosis: HTN (hypertension)  Assessment and Plan of Treatment: Low salt diet  Activity as tolerated.  Take all medication as prescribed.  Follow up with your medical doctor for routine blood pressure monitoring at your next visit.  Notify your doctor if you have any of the following symptoms:   Dizziness, Lightheadedness, Blurry vision, Headache, Chest pain, Shortness of breath    Diagnosis: HLD (hyperlipidemia)  Assessment and Plan of Treatment: Continue all medications as ordered    Diagnosis: Type 2 diabetes mellitus  Assessment and Plan of Treatment: Tresiba 24 units at bedtime  Novolog 12 units three times a day before meals  Metformin 1g two times a day   Make sure you get your HgA1c checked every three months.  If you take oral diabetes medications, check your blood glucose two times a day.  If you take insulin, check your blood glucose before meals and at bedtime.  It's important not to skip any meals.  Keep a log of your blood glucose results and always take it with you to your doctor appointments.  Keep a list of your current medications including injectables and over the counter medications and bring this medication list with you to all your doctor appointments.  If you have not seen your ophthalmologist this year call for appointment.  Check your feet daily for redness, sores, or openings. Do not self treat. If no improvement in two days call your primary care physician for an appointment.  Low blood sugar (hypoglycemia) is a blood sugar below 70mg/dl. Check your blood sugar if you feel signs/symptoms of hypoglycemia. If your blood sugar is below 70 take 15 grams of carbohydrates (ex 4 oz of apple juice, 3-4 glucose tablets, or 4-6 oz of regular soda) wait 15 minutes and repeat blood sugar to make sure it comes up above 70.  If your blood sugar is above 70 and you are due for a meal, have a meal.  If you are not due for a meal have a snack.  This snack helps keeps your blood sugar at a safe range.    Diagnosis: Complete heart block  Assessment and Plan of Treatment: s/p Pacemaker    Diagnosis: Unstable angina  Assessment and Plan of Treatment:

## 2024-12-07 ENCOUNTER — INPATIENT (INPATIENT)
Facility: HOSPITAL | Age: 84
LOS: 5 days | Discharge: NURSING, REHAB OR LONG-TERM CARE | End: 2024-12-13
Attending: INTERNAL MEDICINE | Admitting: INTERNAL MEDICINE
Payer: MEDICARE

## 2024-12-07 VITALS
HEIGHT: 66.5 IN | WEIGHT: 164.91 LBS | HEART RATE: 75 BPM | RESPIRATION RATE: 16 BRPM | DIASTOLIC BLOOD PRESSURE: 76 MMHG | SYSTOLIC BLOOD PRESSURE: 129 MMHG | OXYGEN SATURATION: 98 % | TEMPERATURE: 98 F

## 2024-12-07 DIAGNOSIS — Z96.642 PRESENCE OF LEFT ARTIFICIAL HIP JOINT: Chronic | ICD-10-CM

## 2024-12-07 DIAGNOSIS — M25.551 PAIN IN RIGHT HIP: ICD-10-CM

## 2024-12-07 DIAGNOSIS — Z98.890 OTHER SPECIFIED POSTPROCEDURAL STATES: Chronic | ICD-10-CM

## 2024-12-07 DIAGNOSIS — Z90.710 ACQUIRED ABSENCE OF BOTH CERVIX AND UTERUS: Chronic | ICD-10-CM

## 2024-12-07 DIAGNOSIS — N60.09 SOLITARY CYST OF UNSPECIFIED BREAST: Chronic | ICD-10-CM

## 2024-12-07 LAB
ALBUMIN SERPL ELPH-MCNC: 4.1 G/DL — SIGNIFICANT CHANGE UP (ref 3.3–5)
ALP SERPL-CCNC: 92 U/L — SIGNIFICANT CHANGE UP (ref 40–120)
ALT FLD-CCNC: 58 U/L — HIGH (ref 4–33)
ANION GAP SERPL CALC-SCNC: 13 MMOL/L — SIGNIFICANT CHANGE UP (ref 7–14)
AST SERPL-CCNC: 54 U/L — HIGH (ref 4–32)
BASOPHILS # BLD AUTO: 0.04 K/UL — SIGNIFICANT CHANGE UP (ref 0–0.2)
BASOPHILS NFR BLD AUTO: 0.6 % — SIGNIFICANT CHANGE UP (ref 0–2)
BILIRUB SERPL-MCNC: 0.8 MG/DL — SIGNIFICANT CHANGE UP (ref 0.2–1.2)
BUN SERPL-MCNC: 21 MG/DL — SIGNIFICANT CHANGE UP (ref 7–23)
CALCIUM SERPL-MCNC: 9.4 MG/DL — SIGNIFICANT CHANGE UP (ref 8.4–10.5)
CHLORIDE SERPL-SCNC: 99 MMOL/L — SIGNIFICANT CHANGE UP (ref 98–107)
CO2 SERPL-SCNC: 25 MMOL/L — SIGNIFICANT CHANGE UP (ref 22–31)
CREAT SERPL-MCNC: 1.31 MG/DL — HIGH (ref 0.5–1.3)
EGFR: 40 ML/MIN/1.73M2 — LOW
EOSINOPHIL # BLD AUTO: 0.23 K/UL — SIGNIFICANT CHANGE UP (ref 0–0.5)
EOSINOPHIL NFR BLD AUTO: 3.4 % — SIGNIFICANT CHANGE UP (ref 0–6)
GLUCOSE BLDC GLUCOMTR-MCNC: 112 MG/DL — HIGH (ref 70–99)
GLUCOSE SERPL-MCNC: 137 MG/DL — HIGH (ref 70–99)
HCT VFR BLD CALC: 48.1 % — HIGH (ref 34.5–45)
HGB BLD-MCNC: 15 G/DL — SIGNIFICANT CHANGE UP (ref 11.5–15.5)
IANC: 3.46 K/UL — SIGNIFICANT CHANGE UP (ref 1.8–7.4)
IMM GRANULOCYTES NFR BLD AUTO: 0.3 % — SIGNIFICANT CHANGE UP (ref 0–0.9)
LYMPHOCYTES # BLD AUTO: 2.58 K/UL — SIGNIFICANT CHANGE UP (ref 1–3.3)
LYMPHOCYTES # BLD AUTO: 37.9 % — SIGNIFICANT CHANGE UP (ref 13–44)
MCHC RBC-ENTMCNC: 23.8 PG — LOW (ref 27–34)
MCHC RBC-ENTMCNC: 31.2 G/DL — LOW (ref 32–36)
MCV RBC AUTO: 76.2 FL — LOW (ref 80–100)
MONOCYTES # BLD AUTO: 0.47 K/UL — SIGNIFICANT CHANGE UP (ref 0–0.9)
MONOCYTES NFR BLD AUTO: 6.9 % — SIGNIFICANT CHANGE UP (ref 2–14)
NEUTROPHILS # BLD AUTO: 3.46 K/UL — SIGNIFICANT CHANGE UP (ref 1.8–7.4)
NEUTROPHILS NFR BLD AUTO: 50.9 % — SIGNIFICANT CHANGE UP (ref 43–77)
NRBC # BLD: 0 /100 WBCS — SIGNIFICANT CHANGE UP (ref 0–0)
NRBC # FLD: 0 K/UL — SIGNIFICANT CHANGE UP (ref 0–0)
PLATELET # BLD AUTO: 225 K/UL — SIGNIFICANT CHANGE UP (ref 150–400)
POTASSIUM SERPL-MCNC: 5.1 MMOL/L — SIGNIFICANT CHANGE UP (ref 3.5–5.3)
POTASSIUM SERPL-SCNC: 5.1 MMOL/L — SIGNIFICANT CHANGE UP (ref 3.5–5.3)
PROT SERPL-MCNC: 7.9 G/DL — SIGNIFICANT CHANGE UP (ref 6–8.3)
RBC # BLD: 6.31 M/UL — HIGH (ref 3.8–5.2)
RBC # FLD: 18.8 % — HIGH (ref 10.3–14.5)
SODIUM SERPL-SCNC: 137 MMOL/L — SIGNIFICANT CHANGE UP (ref 135–145)
WBC # BLD: 6.8 K/UL — SIGNIFICANT CHANGE UP (ref 3.8–10.5)
WBC # FLD AUTO: 6.8 K/UL — SIGNIFICANT CHANGE UP (ref 3.8–10.5)

## 2024-12-07 PROCEDURE — 72192 CT PELVIS W/O DYE: CPT | Mod: 26,MC

## 2024-12-07 PROCEDURE — 99285 EMERGENCY DEPT VISIT HI MDM: CPT

## 2024-12-07 PROCEDURE — 73522 X-RAY EXAM HIPS BI 3-4 VIEWS: CPT | Mod: 26

## 2024-12-07 RX ORDER — ACETAMINOPHEN 500MG 500 MG/1
975 TABLET, COATED ORAL ONCE
Refills: 0 | Status: COMPLETED | OUTPATIENT
Start: 2024-12-07 | End: 2024-12-07

## 2024-12-07 RX ORDER — IBUPROFEN 200 MG
400 TABLET ORAL ONCE
Refills: 0 | Status: COMPLETED | OUTPATIENT
Start: 2024-12-07 | End: 2024-12-07

## 2024-12-07 RX ORDER — LIDOCAINE 40 MG/G
1 CREAM TOPICAL ONCE
Refills: 0 | Status: COMPLETED | OUTPATIENT
Start: 2024-12-07 | End: 2024-12-07

## 2024-12-07 RX ORDER — OXYCODONE HYDROCHLORIDE 30 MG/1
5 TABLET ORAL ONCE
Refills: 0 | Status: DISCONTINUED | OUTPATIENT
Start: 2024-12-07 | End: 2024-12-07

## 2024-12-07 RX ORDER — IBUPROFEN 200 MG
600 TABLET ORAL ONCE
Refills: 0 | Status: COMPLETED | OUTPATIENT
Start: 2024-12-07 | End: 2024-12-07

## 2024-12-07 RX ADMIN — ACETAMINOPHEN 500MG 975 MILLIGRAM(S): 500 TABLET, COATED ORAL at 21:26

## 2024-12-07 RX ADMIN — Medication 400 MILLIGRAM(S): at 21:26

## 2024-12-07 RX ADMIN — OXYCODONE HYDROCHLORIDE 5 MILLIGRAM(S): 30 TABLET ORAL at 19:00

## 2024-12-07 RX ADMIN — ACETAMINOPHEN 500MG 975 MILLIGRAM(S): 500 TABLET, COATED ORAL at 15:47

## 2024-12-07 RX ADMIN — LIDOCAINE 1 PATCH: 40 CREAM TOPICAL at 15:48

## 2024-12-07 RX ADMIN — Medication 600 MILLIGRAM(S): at 15:47

## 2024-12-07 NOTE — ED ADULT NURSE NOTE - NSFALLRISKINTERV_ED_ALL_ED

## 2024-12-07 NOTE — CONSULT NOTE ADULT - SUBJECTIVE AND OBJECTIVE BOX
[facility]   MD JASPREET RICHARD MD ANGELA WONG, PA QIAN CHEN, NP      TEL:  OFFICE: [phone]  From 5pm-7am answering service [phone]    --- INITIAL RENAL CONSULT NOTE ---date of service 12-07-24 @ 17:48    HPI:  83yo F with PMH of HTN, T2DM, CHF, hypothyroidism, and CKD 3 c/o R hip pain x1 week.  She denies fall, trauma, or injury to R hip.  Seen by her PCP on 12/4 and had x-ray of the hip performed; results pending.  Pt reports she has been taking Tylenol at home with minimal relief.  Able to bear wgt but walking with cane.       Allergies:  No Known Allergies      PAST MEDICAL & SURGICAL HISTORY:  Obesity, unspecified obesity severity, unspecified obesity type    Essential hypertension    Type 2 diabetes mellitus with complication, without long-term current use of insulin    History of congestive heart disease - as per patient she had chf diagnosed in 2017    Carpal tunnel syndrome - bilateral    Hypothyroidism    OA (osteoarthritis)    H/O: hysterectomy    Cyst of breast, unspecified laterality - bilateral excisions    History of total left hip replacement - 2017, RIGHT HIP    H/O left knee surgery    H/O left breast biopsy        Home Medications Reviewed    Hospital Medications:   MEDICATIONS  (STANDING):      SOCIAL HISTORY:  Denies ETOh, Smoking,     FAMILY HISTORY:  Family history of colon cancer (Sibling)    Family history of heart disease (Sibling)        REVIEW OF SYSTEMS:  CONSTITUTIONAL: No weakness, fevers or chills  EYES/ENT: No visual changes;  No vertigo or throat pain   NECK: No pain or stiffness  RESPIRATORY: No cough, wheezing, hemoptysis; No shortness of breath  CARDIOVASCULAR: No chest pain or palpitations.  GASTROINTESTINAL: No abdominal or epigastric pain. No nausea, vomiting, or hematemesis; No diarrhea or constipation. No melena or hematochezia.  GENITOURINARY: No dysuria, frequency, foamy urine, urinary urgency, incontinence or hematuria  NEUROLOGICAL: No numbness or weakness  SKIN: No itching, burning, rashes, or lesions   VASCULAR: No bilateral lower extremity edema.   All other review of systems is negative unless indicated above.    VITALS:  T(F): 98.2 (12-07-24 @ 13:51), Max: 98.2 (12-07-24 @ 13:51)  HR: 75 (12-07-24 @ 13:51)  BP: 129/76 (12-07-24 @ 13:51)  RR: 16 (12-07-24 @ 13:51)  SpO2: 98% (12-07-24 @ 13:51)  Wt(kg): --    Height (cm): 168.9 (12-07 @ 13:51)  Weight (kg): 74.8 (12-07 @ 13:51)  BMI (kg/m2): 26.2 (12-07 @ 13:51)  BSA (m2): 1.85 (12-07 @ 13:51)    PHYSICAL EXAM:  General: NAD  HEENT: anicteric sclera, oropharynx clear, MMM  Neck: No JVD  Respiratory: CTAB, no wheezes, rales or rhonchi  Cardiovascular: S1, S2, RRR  Gastrointestinal: BS+, soft, NT/ND  Extremities: No cyanosis or clubbing. No peripheral edema  Neurological: A/O x 3, no focal deficits  Psychiatric: Normal mood, normal affect  : No CVA tenderness. No pedroza.   Skin: No rashes      LABS:        Creatinine Trend:     Urine Studies:        RADIOLOGY & ADDITIONAL STUDIES:

## 2024-12-07 NOTE — ED PROVIDER NOTE - CLINICAL SUMMARY MEDICAL DECISION MAKING FREE TEXT BOX
84F PMH as above here for ataumatic r hip pain x 1 week without systemic symptoms endorsing difficulty ambulating x 1 week amd minimal relief with OTC/prescribed pain meds. Will image hip, pain control and ambulate. Low index of suspicion for septic joint given HD stable, will evaluate for fracture. Dispo pending clinical course

## 2024-12-07 NOTE — ED PROVIDER NOTE - PROGRESS NOTE DETAILS
Chadd Aguilar MD PGY3: Patient was signed out to me from previous provider at signout. No acute findings on xr. pt feeling some improvement with meds but pain not resolved, and feels unsafe to ambulate to 3rd floor walkup where she lives alone. To admit for further management.

## 2024-12-07 NOTE — ED ADULT NURSE NOTE - NS PRO PASSIVE SMOKE EXP
Bedside shift change report given to Agus Grey (oncoming nurse) by Rahul Ochoa RN (offgoing nurse). Report included the following information SBAR, Kardex, MAR and Recent Results. Unknown

## 2024-12-07 NOTE — ED ADULT NURSE NOTE - OBJECTIVE STATEMENT
Pt arrives to 3a. Pt is A and Ox4 and ambulatory with a walker. Hx: DM type 2, CKD, HLD, arthritis . Pt arrives to the ED complaining of right hip pain. Pt endorses she has had the pain for a while, but today the pain got too severe. Pt endorses the pain is localized to the R hip. Airway is patent, respirations are even and unlabored. Pt denies chest pain, shortness of breath, headaches, dizziness, numbness and tingling, fever and chills, nausea/vomitting/diarrhea. Skin is clean, dry, intact, and appropriate for race. Pt medicated per MAR. Plan of care ongoing, safety maintained.

## 2024-12-07 NOTE — ED PROVIDER NOTE - NSICDXPASTSURGICALHX_GEN_ALL_CORE_FT
No PAST SURGICAL HISTORY:  Cyst of breast, unspecified laterality bilateral excisions    H/O left breast biopsy     H/O left knee surgery     H/O: hysterectomy     History of total left hip replacement 2017, RIGHT HIP

## 2024-12-07 NOTE — ED PROVIDER NOTE - OBJECTIVE STATEMENT
84-year-old female past medical history of hypertension, diabetes, dementia, lives at home alone at 3 floor walk up, ambulates with cane at baseline, here in the setting of atraumatic hip pain without any use of blood thinners, fevers, chills, shortness of breath, belly pain, urinary symptoms. Can ambulate but is having worsening difficulty 84-year-old female past medical history of hypertension, diabetes, dementia, lives at home alone at 3 floor walk up, ambulates with cane at baseline, here in the setting of atraumatic R hip pain without any use of blood thinners, fevers, chills, shortness of breath, rash, belly pain, urinary symptoms. Can ambulate but is having worsening difficulty 2/2 pain. Was prescribed a medrol dose pack, methacarbamol and duloxetine with minimal relief of sx.

## 2024-12-07 NOTE — ED PROVIDER NOTE - PHYSICAL EXAMINATION
Gen: WDWN, NAD  HEENT: EOMI, no nasal discharge, mucous membranes moist  CV: RRR, +S1/S2, no M/R/G  Resp: CTAB, no W/R/R  GI: Abdomen soft non-distended, NTTP, no masses  MSK: No open wounds, no bruising, no LE edema, full ROM at hip and knee joint, no skin changes, erythema, edema or induration aprpeciated over the hip joint, TTP primarily behind greater trochanter over Illiotibial region  Neuro: A&Ox4, following commands, moving all four extremities spontaneously  Psych: appropriate mood, denies AH, VH, SI

## 2024-12-07 NOTE — CONSULT NOTE ADULT - ASSESSMENT
85yo F with PMH of HTN, T2DM, CHF, hypothyroidism, and CKD 3 c/o R hip pain x1 week.  She denies fall, trauma, or injury to R hip.  Seen by her PCP on 12/4 and had x-ray of the hip performed; results pending.  Pt reports she has been taking Tylenol at home with minimal relief.  Able to bear wgt but walking with cane.  Nephrology consulted for CKD.    A/P  CKD 3:  Established office pt.  Baseline S.Cr. 1.1-1.3.  Monitor BMP and UO.  Avoid nephrotoxins / NSAIDs if possible.    HTN:  BP controlled in ED.  Resume home meds.  Monitor BP.    Hip Pain:  Workup per primary team.

## 2024-12-07 NOTE — ED ADULT TRIAGE NOTE - CHIEF COMPLAINT QUOTE
hx HTN, DM2, Dementia, CKD, HLD. C/o non-traumatic right hip pain for almost 1 week. Grnaddaughter who is also caregiver karlos any falls or injury. Granddaughter states xray was done 12/2 after being eval by pain MD and showed pt had right hip replacement in past that pt does not recall.

## 2024-12-07 NOTE — ED ADULT NURSE NOTE - CHIEF COMPLAINT QUOTE
Dr. Ivan
hx HTN, DM2, Dementia, CKD, HLD. C/o non-traumatic right hip pain for almost 1 week. Grnaddaughter who is also caregiver karlos any falls or injury. Granddaughter states xray was done 12/2 after being eval by pain MD and showed pt had right hip replacement in past that pt does not recall.

## 2024-12-07 NOTE — ED PROVIDER NOTE - ATTENDING CONTRIBUTION TO CARE
Attending note:   After face to face evaluation of this patient, I concur with above noted hx, pe, and care plan for this patient. Jo Ann:  Jo Ann: 84-year-old female with past medical history of hypertension and type 2 diabetes.  Patient also has dementia and was admitted for workup with family close by.  Patient has been having right hip pain for the last few weeks.  Patient notes more difficulty ambulating now.  Patient was seen by PMD and given muscle relaxer and Medrol Dosepak but has had minimal relief.  Patient denies any significant trauma and there is no leg swelling.  On exam patient has some tenderness to the right greater trochanter and the iliotibial band.  However range of motion is normal.  There is no edema or shortened extremity.  Abdomen is soft and nontender.  There is no spinal tenderness noted.  There is no calf tenderness noted.  There is no deformity or abnormality noted on the left side.  Will start with x-rays and pain medication.  Rule out any occult injury.  If patient is not able to get significant relief or consider admission.

## 2024-12-07 NOTE — ED ADULT NURSE REASSESSMENT NOTE - NS ED NURSE REASSESS COMMENT FT1
Report received from day [facility] Florentino. Pt is A&Ox4 and ambulatory with assistance of a cane. Pt still c/o R sided hip pain. MD Aguilar made aware. Denies chest pain, N/V, headache. Respirations even and unlabored. 20G PIV placed in L hand, flushed and intact. Safety maintained with stretcher in lowest position.

## 2024-12-08 DIAGNOSIS — I10 ESSENTIAL (PRIMARY) HYPERTENSION: ICD-10-CM

## 2024-12-08 DIAGNOSIS — Z79.899 OTHER LONG TERM (CURRENT) DRUG THERAPY: ICD-10-CM

## 2024-12-08 DIAGNOSIS — E11.9 TYPE 2 DIABETES MELLITUS WITHOUT COMPLICATIONS: ICD-10-CM

## 2024-12-08 DIAGNOSIS — E03.9 HYPOTHYROIDISM, UNSPECIFIED: ICD-10-CM

## 2024-12-08 DIAGNOSIS — M25.551 PAIN IN RIGHT HIP: ICD-10-CM

## 2024-12-08 DIAGNOSIS — Z29.9 ENCOUNTER FOR PROPHYLACTIC MEASURES, UNSPECIFIED: ICD-10-CM

## 2024-12-08 DIAGNOSIS — R74.01 ELEVATION OF LEVELS OF LIVER TRANSAMINASE LEVELS: ICD-10-CM

## 2024-12-08 DIAGNOSIS — N18.30 CHRONIC KIDNEY DISEASE, STAGE 3 UNSPECIFIED: ICD-10-CM

## 2024-12-08 LAB
A1C WITH ESTIMATED AVERAGE GLUCOSE RESULT: 6.5 % — HIGH (ref 4–5.6)
ALBUMIN SERPL ELPH-MCNC: 3.6 G/DL — SIGNIFICANT CHANGE UP (ref 3.3–5)
ALP SERPL-CCNC: 77 U/L — SIGNIFICANT CHANGE UP (ref 40–120)
ALT FLD-CCNC: 38 U/L — HIGH (ref 4–33)
ANION GAP SERPL CALC-SCNC: 10 MMOL/L — SIGNIFICANT CHANGE UP (ref 7–14)
AST SERPL-CCNC: 25 U/L — SIGNIFICANT CHANGE UP (ref 4–32)
BILIRUB SERPL-MCNC: 0.8 MG/DL — SIGNIFICANT CHANGE UP (ref 0.2–1.2)
BUN SERPL-MCNC: 19 MG/DL — SIGNIFICANT CHANGE UP (ref 7–23)
CALCIUM SERPL-MCNC: 8.9 MG/DL — SIGNIFICANT CHANGE UP (ref 8.4–10.5)
CHLORIDE SERPL-SCNC: 102 MMOL/L — SIGNIFICANT CHANGE UP (ref 98–107)
CO2 SERPL-SCNC: 26 MMOL/L — SIGNIFICANT CHANGE UP (ref 22–31)
CREAT SERPL-MCNC: 1.05 MG/DL — SIGNIFICANT CHANGE UP (ref 0.5–1.3)
EGFR: 52 ML/MIN/1.73M2 — LOW
ESTIMATED AVERAGE GLUCOSE: 140 — SIGNIFICANT CHANGE UP
GLUCOSE SERPL-MCNC: 93 MG/DL — SIGNIFICANT CHANGE UP (ref 70–99)
POTASSIUM SERPL-MCNC: 4.1 MMOL/L — SIGNIFICANT CHANGE UP (ref 3.5–5.3)
POTASSIUM SERPL-SCNC: 4.1 MMOL/L — SIGNIFICANT CHANGE UP (ref 3.5–5.3)
PROT SERPL-MCNC: 6.7 G/DL — SIGNIFICANT CHANGE UP (ref 6–8.3)
SODIUM SERPL-SCNC: 138 MMOL/L — SIGNIFICANT CHANGE UP (ref 135–145)
TSH SERPL-MCNC: 0.97 UIU/ML — SIGNIFICANT CHANGE UP (ref 0.27–4.2)

## 2024-12-08 PROCEDURE — 99223 1ST HOSP IP/OBS HIGH 75: CPT

## 2024-12-08 RX ORDER — POLYETHYLENE GLYCOL 3350 17 G/17G
17 POWDER, FOR SOLUTION ORAL DAILY
Refills: 0 | Status: DISCONTINUED | OUTPATIENT
Start: 2024-12-08 | End: 2024-12-13

## 2024-12-08 RX ORDER — BUMETANIDE 1 MG/1
1 TABLET ORAL DAILY
Refills: 0 | Status: DISCONTINUED | OUTPATIENT
Start: 2024-12-08 | End: 2024-12-13

## 2024-12-08 RX ORDER — SENNOSIDES 8.6 MG
2 TABLET ORAL AT BEDTIME
Refills: 0 | Status: DISCONTINUED | OUTPATIENT
Start: 2024-12-08 | End: 2024-12-13

## 2024-12-08 RX ORDER — OXYCODONE HYDROCHLORIDE 30 MG/1
2.5 TABLET ORAL EVERY 4 HOURS
Refills: 0 | Status: DISCONTINUED | OUTPATIENT
Start: 2024-12-08 | End: 2024-12-12

## 2024-12-08 RX ORDER — OXYCODONE HYDROCHLORIDE 30 MG/1
5 TABLET ORAL EVERY 4 HOURS
Refills: 0 | Status: DISCONTINUED | OUTPATIENT
Start: 2024-12-08 | End: 2024-12-12

## 2024-12-08 RX ORDER — NALOXONE HCL 0.4 MG/ML
0.4 AMPUL (ML) INJECTION ONCE
Refills: 0 | Status: DISCONTINUED | OUTPATIENT
Start: 2024-12-08 | End: 2024-12-13

## 2024-12-08 RX ORDER — HEPARIN SODIUM,PORCINE 1000/ML
5000 VIAL (ML) INJECTION EVERY 8 HOURS
Refills: 0 | Status: DISCONTINUED | OUTPATIENT
Start: 2024-12-08 | End: 2024-12-13

## 2024-12-08 RX ORDER — MEMANTINE HYDROCHLORIDE 14 MG/1
1 CAPSULE, EXTENDED RELEASE ORAL
Refills: 0 | DISCHARGE

## 2024-12-08 RX ORDER — CYANOCOBALAMIN/FOLIC AC/VIT B6 1-2.2-25MG
1 TABLET ORAL DAILY
Refills: 0 | Status: DISCONTINUED | OUTPATIENT
Start: 2024-12-08 | End: 2024-12-13

## 2024-12-08 RX ORDER — MEMANTINE HYDROCHLORIDE 14 MG/1
10 CAPSULE, EXTENDED RELEASE ORAL DAILY
Refills: 0 | Status: DISCONTINUED | OUTPATIENT
Start: 2024-12-08 | End: 2024-12-13

## 2024-12-08 RX ORDER — DAPAGLIFLOZIN 10 MG/1
10 TABLET, FILM COATED ORAL DAILY
Refills: 0 | Status: DISCONTINUED | OUTPATIENT
Start: 2024-12-08 | End: 2024-12-13

## 2024-12-08 RX ORDER — METOPROLOL TARTRATE 100 MG/1
12.5 TABLET, FILM COATED ORAL
Refills: 0 | Status: DISCONTINUED | OUTPATIENT
Start: 2024-12-08 | End: 2024-12-13

## 2024-12-08 RX ORDER — DULOXETINE HCL 60 MG
30 CAPSULE,DELAYED RELEASE (ENTERIC COATED) ORAL DAILY
Refills: 0 | Status: DISCONTINUED | OUTPATIENT
Start: 2024-12-08 | End: 2024-12-13

## 2024-12-08 RX ORDER — DULOXETINE HCL 60 MG
1 CAPSULE,DELAYED RELEASE (ENTERIC COATED) ORAL
Refills: 0 | DISCHARGE

## 2024-12-08 RX ORDER — LEVOTHYROXINE SODIUM 150 MCG
75 TABLET ORAL DAILY
Refills: 0 | Status: DISCONTINUED | OUTPATIENT
Start: 2024-12-08 | End: 2024-12-13

## 2024-12-08 RX ORDER — SACUBITRIL AND VALSARTAN 24; 26 MG/1; MG/1
1 TABLET, FILM COATED ORAL
Refills: 0 | Status: DISCONTINUED | OUTPATIENT
Start: 2024-12-08 | End: 2024-12-13

## 2024-12-08 RX ORDER — LIDOCAINE 40 MG/G
1 CREAM TOPICAL DAILY
Refills: 0 | Status: DISCONTINUED | OUTPATIENT
Start: 2024-12-08 | End: 2024-12-13

## 2024-12-08 RX ORDER — CYANOCOBALAMIN/FOLIC AC/VIT B6 1-2.2-25MG
1 TABLET ORAL
Refills: 0 | DISCHARGE

## 2024-12-08 RX ORDER — INFLUENZA VIRUS VACCINE 15; 15; 15; 15 UG/.5ML; UG/.5ML; UG/.5ML; UG/.5ML
0.5 SUSPENSION INTRAMUSCULAR ONCE
Refills: 0 | Status: DISCONTINUED | OUTPATIENT
Start: 2024-12-08 | End: 2024-12-13

## 2024-12-08 RX ADMIN — Medication 1 TABLET(S): at 18:38

## 2024-12-08 RX ADMIN — OXYCODONE HYDROCHLORIDE 5 MILLIGRAM(S): 30 TABLET ORAL at 13:20

## 2024-12-08 RX ADMIN — METOPROLOL TARTRATE 12.5 MILLIGRAM(S): 100 TABLET, FILM COATED ORAL at 18:38

## 2024-12-08 RX ADMIN — OXYCODONE HYDROCHLORIDE 5 MILLIGRAM(S): 30 TABLET ORAL at 21:07

## 2024-12-08 RX ADMIN — Medication 5000 UNIT(S): at 13:21

## 2024-12-08 RX ADMIN — Medication 30 MILLIGRAM(S): at 19:03

## 2024-12-08 RX ADMIN — SACUBITRIL AND VALSARTAN 1 TABLET(S): 24; 26 TABLET, FILM COATED ORAL at 18:37

## 2024-12-08 RX ADMIN — Medication 81 MILLIGRAM(S): at 18:38

## 2024-12-08 RX ADMIN — Medication 75 MICROGRAM(S): at 18:37

## 2024-12-08 RX ADMIN — BUMETANIDE 1 MILLIGRAM(S): 1 TABLET ORAL at 18:37

## 2024-12-08 RX ADMIN — OXYCODONE HYDROCHLORIDE 5 MILLIGRAM(S): 30 TABLET ORAL at 21:37

## 2024-12-08 RX ADMIN — LIDOCAINE 1 PATCH: 40 CREAM TOPICAL at 13:20

## 2024-12-08 RX ADMIN — MEMANTINE HYDROCHLORIDE 10 MILLIGRAM(S): 14 CAPSULE, EXTENDED RELEASE ORAL at 18:37

## 2024-12-08 RX ADMIN — Medication 2 TABLET(S): at 21:07

## 2024-12-08 RX ADMIN — Medication 5000 UNIT(S): at 21:07

## 2024-12-08 RX ADMIN — Medication 5000 UNIT(S): at 05:43

## 2024-12-08 RX ADMIN — DAPAGLIFLOZIN 10 MILLIGRAM(S): 10 TABLET, FILM COATED ORAL at 19:03

## 2024-12-08 NOTE — H&P ADULT - ASSESSMENT
84F with PMH HTN, T2DM, CHF, hypothryoidism, CKD3 presents with R hip pain ongoing for several months without associated trauma or injury causing difficulty with ambulation.

## 2024-12-08 NOTE — H&P ADULT - PROBLEM SELECTOR PLAN 5
-pt allegedly with history of hypothyroidism, will get TSH with AM labs  -determine levothyroxine dosing if any and continue PO.

## 2024-12-08 NOTE — H&P ADULT - PROBLEM SELECTOR PLAN 6
-pt. with mildly elevated LFT's  -would trend with CMP in AM, if continuing to rise would  get RUQ ultrasound to assess for structural etiology.

## 2024-12-08 NOTE — PHYSICAL THERAPY INITIAL EVALUATION ADULT - GENERAL OBSERVATIONS, REHAB EVAL
Patient received semi-supine in NAD, agreeable to participate. Grand-daughter at bedside. 99% oxygen saturation on room air. Patient received semi-supine in NAD, agreeable to participate. Granddaughter at bedside. 99% oxygen saturation on room air.

## 2024-12-08 NOTE — H&P ADULT - NSHPSOCIALHISTORY_GEN_ALL_CORE
Denies smoking or alcohol use history  LIves alone, ambulates mostly with cane, has walker at home as well if needed. Denies smoking or alcohol use history  LIves alone, ambulates mostly with cane, has walker at home  if needed.

## 2024-12-08 NOTE — H&P ADULT - PROBLEM SELECTOR PLAN 4
-pt with reported history of DM however cannot recall meds  -A1C in AM  -glucose levels adequate while inpatient, will defer LDISS at this time however continue fingersticks before meals and bedtime  -f/u A1C

## 2024-12-08 NOTE — PHYSICAL THERAPY INITIAL EVALUATION ADULT - PERTINENT HX OF CURRENT PROBLEM, REHAB EVAL
84 year old female with PMH HTN, T2DM, CHF, hypothyroidism, CKD3 presents with R hip pain ongoing for several months without associated trauma or injury causing difficulty with ambulation. 84 year old female with PMH HTN, T2DM, CHF, hypothyroidism, CKD3 presents with R hip pain ongoing for several months without associated trauma or injury causing difficulty with ambulation.    CT hip: no fractures seen. right hip prosthesis seen.

## 2024-12-08 NOTE — H&P ADULT - PROBLEM SELECTOR PLAN 7
-patient unable to recall her home medications however reports her granddaughter will visit tomorrow and give med list to provider  -f/u med list with patient's granddaughter in AM

## 2024-12-08 NOTE — H&P ADULT - HISTORY OF PRESENT ILLNESS
84F with PMH HTN, T2DM, CHF, hypothryoidism, CKD3 presents with R hip pain ongoing for several months without associated trauma or injury. She describes the pain as constant and nonradiating without any exacerbating factors. Has been taking tylenol and aspirin for the pain without much relief. She reports the pain is such that it is affecting her ability to ambulate and thus is here for evaluation. Denies any pain in legs, feet, arms, denies CP, 84F with PMH HTN, T2DM, CHF, hypothryoidism, CKD3 presents with R hip pain ongoing for several months without associated trauma or injury. She describes the pain as constant and nonradiating without any exacerbating factors. Has been taking tylenol and aspirin for the pain without much relief. She reports the pain is such that it is affecting her ability to ambulate and thus is here for evaluation. Denies any previous history of hip pain. Denies any pain in legs, feet, arms, denies CP/SOB, denies any neurological sensory/motor deficits. Denied any surgical history on her hip however CT noting R hip prosthesis.    In ED labs notable for slightly elevated CR. XR hip negative for acute fracture, CT showing R hip prosthesis w/o hardware complication or fracture. S/p lidocaine patch, ibuprofen, tylenol, and oxycodone 5 in ED.

## 2024-12-08 NOTE — H&P ADULT - PROBLEM SELECTOR PLAN 3
-pt. reportedly on antihypertensives at home however cannot remember medications  -monitor BP in interim

## 2024-12-08 NOTE — PHARMACOTHERAPY INTERVENTION NOTE - COMMENTS
Medication history is complete. Medication list confirmed with patient's daughter. Outpatient Medication Record (OMR) updated.     Of note:  -Patient started taking duloxetine dr 30mg once a day in the morning 12/5/24 for the first time.  -Patient prescribed tizanidine 2mg tablets 1 tablet 3 times a day as needed on 12/5/24.  Patient's daughter states she was giving it to her once a day so she could monitor her for side effects.  -Patient prescribed a Medrol Dosepak 12/2/24 which she completed.    Please call spectra [phone] if you have any questions.       no

## 2024-12-08 NOTE — H&P ADULT - NSHPPHYSICALEXAM_GEN_ALL_CORE
VITALS:   T(C): 36.6 (12-07-24 @ 23:45), Max: 36.8 (12-07-24 @ 13:51)  HR: 74 (12-07-24 @ 23:45) (71 - 75)  BP: 117/76 (12-07-24 @ 23:45) (117/76 - 136/89)  RR: 17 (12-07-24 @ 23:45) (15 - 17)  SpO2: 99% (12-07-24 @ 23:45) (98% - 100%)    GENERAL: NAD, lying in bed comfortably  HEAD:  Atraumatic, normocephalic  EYES: EOMI, PERRLA, conjunctiva and sclera clear  ENT: Moist mucous membranes  NECK: Supple, no JVD  HEART: Regular rate and rhythm, no murmurs, rubs, or gallops  LUNGS: Unlabored respirations.  Clear to auscultation bilaterally, no crackles, wheezing, or rhonchi  ABDOMEN: Soft, nontender, nondistended, +BS  EXTREMITIES: 2+ peripheral pulses bilaterally. No clubbing, cyanosis, or edema  NERVOUS SYSTEM:  A&Ox3, no focal deficits   SKIN: No rashes or lesions VITALS:   T(C): 36.6 (12-07-24 @ 23:45), Max: 36.8 (12-07-24 @ 13:51)  HR: 74 (12-07-24 @ 23:45) (71 - 75)  BP: 117/76 (12-07-24 @ 23:45) (117/76 - 136/89)  RR: 17 (12-07-24 @ 23:45) (15 - 17)  SpO2: 99% (12-07-24 @ 23:45) (98% - 100%)    GENERAL: NAD, lying in bed comfortably  HEAD:  Atraumatic, normocephalic  EYES: EOMI, PERRLA, conjunctiva and sclera clear  ENT: Moist mucous membranes  NECK: Supple, no JVD  HEART: Regular rate and rhythm, no murmurs, rubs, or gallops  LUNGS: Unlabored respirations.  Clear to auscultation bilaterally, no crackles, wheezing, or rhonchi  ABDOMEN: Soft, nontender, nondistended, +BS  EXTREMITIES: 2+ peripheral pulses bilaterally. No clubbing, cyanosis, or edema. R hip TTP however no fracture/breaks noted.  NERVOUS SYSTEM:  A&Ox3, no focal deficits   SKIN: No rashes or lesions

## 2024-12-08 NOTE — PHYSICAL THERAPY INITIAL EVALUATION ADULT - GAIT DISTANCE, PT EVAL
randy machuca Deferred further ambulation due to pain. RN Yelena aware, awaiting orders from ACP./25 feet

## 2024-12-08 NOTE — PHYSICAL THERAPY INITIAL EVALUATION ADULT - ADDITIONAL COMMENTS
Patient lives alone in an apartment, +3 flights steps. Patient uses cane for ambulation, granddaughter is aide through [employer]. Denies falls or trauma. Owns standard walker.     Patient left sitting at the edge of bed in NAD, +call bell. Granddaughter at bedside. RN aware.

## 2024-12-08 NOTE — H&P ADULT - PROBLEM SELECTOR PLAN 1
-pt reporting several month history of R hip pain now reporting that the pain is affecting her ability to ambulate.  -Denied any previous hip procedures however CT of hip showing R side prosthesis  -denied any radiation of pain, likely degenerative given chronicity and alleged prev hip replacement.  -would obtain further collateral from patient's granddaughter in AM.                   -PT consult ordered  -PO tylenol for mild pain, can give IV tylenol for moderate pain q6 and oxy 5 for severe pain, however would trend LFT's. If continuing to increase hold tylenol  -c/w lidocaine patch daily

## 2024-12-08 NOTE — PHYSICAL THERAPY INITIAL EVALUATION ADULT - AMBULATION SKILLS, REHAB EVAL
Speech Language/Pathology  Speech-Language Pathology Progress Note      Patient Name: Tee Caballero    DBDOQ'S Date: 10/26/2023      Subjective:  Pt was awake and alert. He was sitting upright in bed. Patient answered simple questions w/ one to two word answers. Dysarthria noted    Objective:  Pt was seen today for dysphagia therapy. Current diet is NPO. Pt was on room air. Oral care was completed with use of Yankauer suction, toothbrush/toothpaste, and mouth swabs. Focus of today's session was to maximize PO intake safety, determine potential for diet texture advancement, and determine potential for liquid consistency advancement. Textures offered today included puree, ice chips via tsp, and honey thick liquid via tsp. Swallow function:   Bolus retrieval was moderately and reduced. Loss on the L noted, unable to contain the ice chip. Manipulation and AP transfer were moderately, prolonged, and sluggish. Pharyngeal swallow initiation was sluggish and delayed. Hyolaryngeal excursion was reduced. Cough and Vocal wetness occurred with puree, ice chips via tsp, and honey thick liquid via tsp  SLP provided mod cueing/feedback throughout session. Pt was receptive and agreeable. Coughing continues w/ all trials, suction attempted. Assessment:  Pt continues to present w/ severe oropharyngeal dysphagia brittney by reduced manipulation and risk for spill. Suspected swallow delay. Pt w/ coughing on all trials attempted. Plan:  Continue NPO, Ng for now. Continue ST follow up. Subsequent sessions to focus on maximizing PO intake safety, determining potential for diet texture advancement, determining potential for liquid consistency advancement, and MBS when appropriate . Please complete oral care often. independent/needs device

## 2024-12-08 NOTE — H&P ADULT - NSHPREVIEWOFSYSTEMS_GEN_ALL_CORE

## 2024-12-08 NOTE — PHYSICAL THERAPY INITIAL EVALUATION ADULT - GAIT DEVIATIONS NOTED, PT EVAL
decreased florina/increased time in double stance/decreased velocity of limb motion/decreased step length/decreased stride length/decreased weight-shifting ability

## 2024-12-08 NOTE — H&P ADULT - NSHPLABSRESULTS_GEN_ALL_CORE
LABS:                          15.0   6.80  )-----------( 225      ( 07 Dec 2024 22:34 )             48.1     12-07    137  |  99  |  21  ----------------------------<  137[H]  5.1   |  25  |  1.31[H]    Ca    9.4      07 Dec 2024 19:48    TPro  7.9  /  Alb  4.1  /  TBili  0.8  /  DBili  x   /  AST  54[H]  /  ALT  58[H]  /  AlkPhos  92  12-07

## 2024-12-08 NOTE — PATIENT PROFILE ADULT - FALL HARM RISK - HARM RISK INTERVENTIONS

## 2024-12-08 NOTE — H&P ADULT - PROBLEM/PLAN-1
DISPLAY PLAN FREE TEXT
Detail Level: Zone
Photo Preface (Leave Blank If You Do Not Want): Photographs were obtained today

## 2024-12-09 ENCOUNTER — TRANSCRIPTION ENCOUNTER (OUTPATIENT)
Age: 84
End: 2024-12-09

## 2024-12-09 LAB
ANION GAP SERPL CALC-SCNC: 14 MMOL/L — SIGNIFICANT CHANGE UP (ref 7–14)
BUN SERPL-MCNC: 19 MG/DL — SIGNIFICANT CHANGE UP (ref 7–23)
CALCIUM SERPL-MCNC: 9.2 MG/DL — SIGNIFICANT CHANGE UP (ref 8.4–10.5)
CHLORIDE SERPL-SCNC: 100 MMOL/L — SIGNIFICANT CHANGE UP (ref 98–107)
CO2 SERPL-SCNC: 23 MMOL/L — SIGNIFICANT CHANGE UP (ref 22–31)
CREAT SERPL-MCNC: 1.08 MG/DL — SIGNIFICANT CHANGE UP (ref 0.5–1.3)
EGFR: 50 ML/MIN/1.73M2 — LOW
GLUCOSE SERPL-MCNC: 105 MG/DL — HIGH (ref 70–99)
HCT VFR BLD CALC: 46.7 % — HIGH (ref 34.5–45)
HGB BLD-MCNC: 14.8 G/DL — SIGNIFICANT CHANGE UP (ref 11.5–15.5)
MCHC RBC-ENTMCNC: 23.5 PG — LOW (ref 27–34)
MCHC RBC-ENTMCNC: 31.7 G/DL — LOW (ref 32–36)
MCV RBC AUTO: 74.1 FL — LOW (ref 80–100)
NRBC # BLD: 0 /100 WBCS — SIGNIFICANT CHANGE UP (ref 0–0)
NRBC # FLD: 0 K/UL — SIGNIFICANT CHANGE UP (ref 0–0)
PLATELET # BLD AUTO: 206 K/UL — SIGNIFICANT CHANGE UP (ref 150–400)
POTASSIUM SERPL-MCNC: 4 MMOL/L — SIGNIFICANT CHANGE UP (ref 3.5–5.3)
POTASSIUM SERPL-SCNC: 4 MMOL/L — SIGNIFICANT CHANGE UP (ref 3.5–5.3)
PTH-INTACT FLD-MCNC: 69 PG/ML — HIGH (ref 15–65)
RBC # BLD: 6.3 M/UL — HIGH (ref 3.8–5.2)
RBC # FLD: 18.7 % — HIGH (ref 10.3–14.5)
SODIUM SERPL-SCNC: 137 MMOL/L — SIGNIFICANT CHANGE UP (ref 135–145)
WBC # BLD: 4.61 K/UL — SIGNIFICANT CHANGE UP (ref 3.8–10.5)
WBC # FLD AUTO: 4.61 K/UL — SIGNIFICANT CHANGE UP (ref 3.8–10.5)

## 2024-12-09 RX ORDER — SENNOSIDES 8.6 MG
2 TABLET ORAL
Qty: 0 | Refills: 0 | DISCHARGE
Start: 2024-12-09

## 2024-12-09 RX ORDER — OXYCODONE HYDROCHLORIDE 30 MG/1
1 TABLET ORAL
Qty: 9 | Refills: 0
Start: 2024-12-09 | End: 2024-12-11

## 2024-12-09 RX ORDER — POLYETHYLENE GLYCOL 3350 17 G/17G
17 POWDER, FOR SOLUTION ORAL
Qty: 0 | Refills: 0 | DISCHARGE
Start: 2024-12-09

## 2024-12-09 RX ORDER — LIDOCAINE 40 MG/G
1 CREAM TOPICAL
Qty: 5 | Refills: 0
Start: 2024-12-09 | End: 2024-12-13

## 2024-12-09 RX ORDER — TIZANIDINE 4 MG/1
1 TABLET ORAL
Refills: 0 | DISCHARGE

## 2024-12-09 RX ADMIN — LIDOCAINE 1 PATCH: 40 CREAM TOPICAL at 08:54

## 2024-12-09 RX ADMIN — Medication 30 MILLIGRAM(S): at 08:55

## 2024-12-09 RX ADMIN — SACUBITRIL AND VALSARTAN 1 TABLET(S): 24; 26 TABLET, FILM COATED ORAL at 06:05

## 2024-12-09 RX ADMIN — Medication 1 TABLET(S): at 08:55

## 2024-12-09 RX ADMIN — BUMETANIDE 1 MILLIGRAM(S): 1 TABLET ORAL at 06:05

## 2024-12-09 RX ADMIN — DAPAGLIFLOZIN 10 MILLIGRAM(S): 10 TABLET, FILM COATED ORAL at 08:55

## 2024-12-09 RX ADMIN — SACUBITRIL AND VALSARTAN 1 TABLET(S): 24; 26 TABLET, FILM COATED ORAL at 17:10

## 2024-12-09 RX ADMIN — Medication 5000 UNIT(S): at 21:38

## 2024-12-09 RX ADMIN — Medication 5000 UNIT(S): at 13:29

## 2024-12-09 RX ADMIN — MEMANTINE HYDROCHLORIDE 10 MILLIGRAM(S): 14 CAPSULE, EXTENDED RELEASE ORAL at 08:55

## 2024-12-09 RX ADMIN — OXYCODONE HYDROCHLORIDE 5 MILLIGRAM(S): 30 TABLET ORAL at 15:44

## 2024-12-09 RX ADMIN — Medication 5000 UNIT(S): at 06:05

## 2024-12-09 RX ADMIN — METOPROLOL TARTRATE 12.5 MILLIGRAM(S): 100 TABLET, FILM COATED ORAL at 17:10

## 2024-12-09 RX ADMIN — Medication 2 TABLET(S): at 21:38

## 2024-12-09 RX ADMIN — Medication 75 MICROGRAM(S): at 05:23

## 2024-12-09 RX ADMIN — Medication 81 MILLIGRAM(S): at 08:55

## 2024-12-09 RX ADMIN — METOPROLOL TARTRATE 12.5 MILLIGRAM(S): 100 TABLET, FILM COATED ORAL at 06:05

## 2024-12-09 NOTE — DISCHARGE NOTE PROVIDER - CARE PROVIDER_API CALL
Anirudh Bauer [facility]  [address] [place] [address]  Phone: [phone]  Fax: [phone]  Follow Up Time:

## 2024-12-09 NOTE — DISCHARGE NOTE PROVIDER - NSDCMRMEDTOKEN_GEN_ALL_CORE_FT
aspirin 81 mg oral delayed release tablet: 1 tab(s) orally once a day  Bumex 1 mg oral tablet: 1 tab(s) orally once a day  DULoxetine 30 mg oral delayed release capsule: 1 cap(s) orally once a day (new medication. pt started taking 12/5/24)  Entresto 49 mg-51 mg oral tablet: 1 tab(s) orally 2 times a day  Farxiga 10 mg oral tablet: 1 tab(s) orally once a day  Januvia 100 mg oral tablet: 1 tab(s) orally once a day  Kerendia 10 mg oral tablet: 1 tab(s) orally once a day  levothyroxine 75 mcg (0.075 mg) oral tablet: 1 tab(s) orally once a day  lidocaine 4% topical film: Apply topically to affected area once a day  memantine 10 mg oral tablet: 1 tab(s) orally once a day  metFORMIN 500 mg oral tablet: 1 tab(s) orally once a day  Metoprolol Tartrate 25 mg oral tablet: 0.5 tab(s) orally 2 times a day  Multiple Vitamins oral tablet: 1 tab(s) orally once a day  nitroglycerin 0.4 mg sublingual tablet: 1 tab(s) sublingually every 5 minutes as needed for  chest pain Up to 3 tabs a day.  oxyCODONE 5 mg oral tablet: 1 tab(s) orally every 8 hours as needed for Severe Pain (7 - 10) MDD: 3 tabs  polyethylene glycol 3350 oral powder for reconstitution: 17 gram(s) orally once a day as needed for  constipation  senna leaf extract oral tablet: 2 tab(s) orally once a day (at bedtime)   aspirin 81 mg oral delayed release tablet: 1 tab(s) orally once a day  Bumex 1 mg oral tablet: 1 tab(s) orally once a day  DULoxetine 30 mg oral delayed release capsule: 1 cap(s) orally once a day (new medication. pt started taking 12/5/24)  Entresto 49 mg-51 mg oral tablet: 1 tab(s) orally 2 times a day  Farxiga 10 mg oral tablet: 1 tab(s) orally once a day  insulin lispro 100 units/mL injectable solution: 1 unit(s) injectable 3 times a day (before meals) Use sliding scale at rehab as follows:  1 Unit(s) if Glucose 151 - 200  2 Unit(s) if Glucose 201 - 250  3 Unit(s) if Glucose 251 - 300  4 Unit(s) if Glucose 301 - 350  5 Unit(s) if Glucose 351 - 400  6 Unit(s) if Glucose Greater Than 400  levothyroxine 75 mcg (0.075 mg) oral tablet: 1 tab(s) orally once a day  lidocaine 4% topical film: Apply topically to affected area once a day  memantine 10 mg oral tablet: 1 tab(s) orally once a day  Metoprolol Tartrate 25 mg oral tablet: 0.5 tab(s) orally 2 times a day  Multiple Vitamins oral tablet: 1 tab(s) orally once a day  nitroglycerin 0.4 mg sublingual tablet: 1 tab(s) sublingually every 5 minutes as needed for  chest pain Up to 3 tabs a day.  oxyCODONE 5 mg oral tablet: 1 tab(s) orally every 8 hours as needed for Severe Pain (7 - 10) MDD: 3 tabs  polyethylene glycol 3350 oral powder for reconstitution: 17 gram(s) orally once a day as needed for  constipation  senna leaf extract oral tablet: 2 tab(s) orally once a day (at bedtime)

## 2024-12-09 NOTE — DISCHARGE NOTE PROVIDER - HOSPITAL COURSE
84- yo F PMH of HTN, DM, dementia, lives at home alone at 3 floor walk up, ambulates with cane at baseline, here in the setting of atraumatic R hip pain. R hip XR showed no acute fx or dislocation. CT pelvis showed R hip prosthesis without evidence of hardware complication and no acute fx. Pt started on Oxy PRN pain control. Patient seen by PT who recommended home PT and RW.     On 12/9/24 this case was reviewed with Dr. Aguayo. The patient is medically stable and optimized for discharge. All medications were reviewed and prescriptions were sent to mutually agreed upon pharmacy. 84- yo F PMH of HTN, DM, dementia, lives at home alone at 3 floor walk up, ambulates with cane at baseline, here in the setting of atraumatic R hip pain. R hip XR showed no acute fx or dislocation. CT pelvis showed R hip prosthesis without evidence of hardware complication and no acute fx. Pt started on Oxy PRN pain control. Patient seen by PT who originally recommended home PT and RW, then changed recommendations on 12/9 to rehab.     On ___ this case was reviewed with Dr. Aguayo. The patient is medically stable and optimized for discharge. All medications were reviewed. 84- yo F PMH of HTN, DM, dementia, lives at home alone at 3 floor walk up, ambulates with cane at baseline, here in the setting of atraumatic R hip pain. R hip XR showed no acute fx or dislocation. CT pelvis showed R hip prosthesis without evidence of hardware complication and no acute fx. Pt started on Oxy PRN pain control. Patient seen by PT who originally recommended home PT and RW, then changed recommendations on 12/9/24 to subacute rehab.     On 12/13/2024 this case was reviewed with Dr. Aguayo. The patient is medically stable and optimized for discharge. All medications were reviewed.

## 2024-12-09 NOTE — DISCHARGE NOTE PROVIDER - NSDCCPCAREPLAN_GEN_ALL_CORE_FT
PRINCIPAL DISCHARGE DIAGNOSIS  Diagnosis: Right hip pain  Assessment and Plan of Treatment: Xray and CT scan of right hip negative. Started on Oxycodone as needed for pain relief. Continue Oxycodone as needed for severe pain as prescribed. Continue Lidocaine patch to right hip for pain control as prescribed. Follow up with PCP for further pain control management.      SECONDARY DISCHARGE DIAGNOSES  Diagnosis: Diabetes mellitus  Assessment and Plan of Treatment: Low salt, fat and carbohydrate diet, minimize glucose intake. Follow up with primary care physician and endocrinologist for routine Hemoglobin A1C checks and management.  Follow up with your ophthalmologist for routine yearly vision exams.     PRINCIPAL DISCHARGE DIAGNOSIS  Diagnosis: Right hip pain  Assessment and Plan of Treatment: Xray and CT scan of right hip negative. Started on Oxycodone as needed for pain relief. Continue Oxycodone as needed for severe pain as prescribed. Continue Lidocaine patch to right hip for pain control as prescribed. Follow up with PCP for further pain control management.      SECONDARY DISCHARGE DIAGNOSES  Diagnosis: Diabetes mellitus  Assessment and Plan of Treatment: Low salt, fat and carbohydrate diet, minimize glucose intake. Follow up with primary care physician and endocrinologist for routine Hemoglobin A1C checks and management.  Follow up with your ophthalmologist for routine yearly vision exams.  Stop metformin and Januvia. Use sliding scale while at rehab.

## 2024-12-09 NOTE — CONSULT NOTE ADULT - SUBJECTIVE AND OBJECTIVE BOX
Cardiovascular Disease Initial Evaluation  DATE OF SERVICE: 12-09-24 @ 08:40    CHIEF COMPLAINT: R hip pain    HISTORY OF PRESENT ILLNESS:  This is an 84 year old woman with hypertension, Hypothyroidism, CKD III and compensated diastolic CHF who presented to [facility] on 12/8/2024 with R hip pain and difficulty walking.  She initially presented in 10/2024 with chest pain. At that time, echo showed normal LV systolic function.  Currently she denies pain or SOB.       Allergies  No Known Allergies      MEDICATIONS:  aspirin enteric coated 81 milliGRAM(s) Oral daily  buMETAnide 1 milliGRAM(s) Oral daily  heparin   Injectable 5000 Unit(s) SubCutaneous every 8 hours  metoprolol tartrate 12.5 milliGRAM(s) Oral two times a day  sacubitril 49 mG/valsartan 51 mG 1 Tablet(s) Oral two times a day        DULoxetine 30 milliGRAM(s) Oral daily  memantine 10 milliGRAM(s) Oral daily  oxyCODONE    IR 2.5 milliGRAM(s) Oral every 4 hours PRN  oxyCODONE    IR 5 milliGRAM(s) Oral every 4 hours PRN    bisacodyl 5 milliGRAM(s) Oral daily PRN  polyethylene glycol 3350 17 Gram(s) Oral daily  senna 2 Tablet(s) Oral at bedtime    dapagliflozin 10 milliGRAM(s) Oral daily  levothyroxine 75 MICROGram(s) Oral daily    influenza  Vaccine (HIGH DOSE) 0.5 milliLiter(s) IntraMuscular once  lidocaine   4% Patch 1 Patch Transdermal daily  multivitamin 1 Tablet(s) Oral daily      PAST MEDICAL & SURGICAL HISTORY:  Obesity, unspecified obesity severity, unspecified obesity type      Essential hypertension      Type 2 diabetes mellitus with complication, without long-term current use of insulin      History of congestive heart disease  as per patient she had chf diagnosed in 2017      Carpal tunnel syndrome  bilateral      Hypothyroidism      OA (osteoarthritis)      H/O: hysterectomy      Cyst of breast, unspecified laterality  bilateral excisions      History of total left hip replacement  2017, RIGHT HIP      H/O left knee surgery      H/O left breast biopsy          FAMILY HISTORY:  Family history of colon cancer (Sibling)    Family history of heart disease (Sibling)        SOCIAL HISTORY:    The patient is a nonsmoker       REVIEW OF SYSTEMS:  See HPI, otherwise complete 14 point review of systems negative    PHYSICAL EXAM:  T(C): 36.3 (12-09-24 @ 06:30), Max: 36.8 (12-08-24 @ 20:28)  HR: 70 (12-09-24 @ 06:30) (70 - 86)  BP: 120/67 (12-09-24 @ 06:30) (120/67 - 138/86)  RR: 18 (12-09-24 @ 06:30) (16 - 18)  SpO2: 98% (12-09-24 @ 06:30) (98% - 99%)  Wt(kg): --  I&O's Summary      Appearance: No Acute Distress; resting comfortably  HEENT:  Normal oral mucosa, PERRL, EOMI	  Cardiovascular: Normal S1 S2, No JVD, No murmurs/rubs/gallops  Respiratory: Normal respiratory effort; Lungs clear to auscultation bilaterally  Gastrointestinal:  Soft, Non-tender, + BS	  Skin: No rashes, No ecchymoses, No cyanosis	  Neurologic: Non-focal; no weakness  Extremities: No clubbing, cyanosis or edema  Vascular: Peripheral pulses palpable 2+ bilaterally  Psychiatry: A & O x 3, Mood & affect appropriate    Laboratory Data:	 	    CBC Full  -  ( 09 Dec 2024 07:35 )  WBC Count : 4.61 K/uL  Hemoglobin : 14.8 g/dL  Hematocrit : 46.7 %  Platelet Count - Automated : 206 K/uL  Mean Cell Volume : 74.1 fL  Mean Cell Hemoglobin : 23.5 pg  Mean Cell Hemoglobin Concentration : 31.7 g/dL  Auto Neutrophil # : x  Auto Lymphocyte # : x  Auto Monocyte # : x  Auto Eosinophil # : x  Auto Basophil # : x  Auto Neutrophil % : x  Auto Lymphocyte % : x  Auto Monocyte % : x  Auto Eosinophil % : x  Auto Basophil % : x    12-08    138  |  102  |  19  ----------------------------<  93  4.1   |  26  |  1.05  12-07    137  |  99  |  21  ----------------------------<  137[H]  5.1   |  25  |  1.31[H]    Ca    8.9      08 Dec 2024 15:30  Ca    9.4      07 Dec 2024 19:48    TPro  6.7  /  Alb  3.6  /  TBili  0.8  /  DBili  x   /  AST  25  /  ALT  38[H]  /  AlkPhos  77  12-08  TPro  7.9  /  Alb  4.1  /  TBili  0.8  /  DBili  x   /  AST  54[H]  /  ALT  58[H]  /  AlkPhos  92  12-07      Interpretation of Telemetry: n/a    ECG:  	Sinus; low voltage      Assessment: 84 year old woman with HTN, Hypothyroidism, OA, Obesity and Type-2 DM presents with R hip pain.     Plan of Care:     #Compensated diastolic CHF-  Recent echo notable for advanced diastolic dysfunction with possible infiltrative cardiomyopathy.  Low voltage also noted on EKG.  Given that the patient is doing well with medical therapy, I would not pursue a cardiac amyloidosis work up.  Patient is not fluid overloaded.  Continue oral Bumex.       #HTN-  BP acceptable.         76 minutes spent on total encounter; more than 50% of the visit was spent counseling and/or coordinating care by the attending physician.   	  Boby Browne MD [facility]  Cardiovascular Diseases  [phone]

## 2024-12-10 RX ORDER — ACETAMINOPHEN 500MG 500 MG/1
975 TABLET, COATED ORAL EVERY 8 HOURS
Refills: 0 | Status: COMPLETED | OUTPATIENT
Start: 2024-12-10 | End: 2024-12-11

## 2024-12-10 RX ADMIN — SACUBITRIL AND VALSARTAN 1 TABLET(S): 24; 26 TABLET, FILM COATED ORAL at 05:54

## 2024-12-10 RX ADMIN — METOPROLOL TARTRATE 12.5 MILLIGRAM(S): 100 TABLET, FILM COATED ORAL at 17:53

## 2024-12-10 RX ADMIN — LIDOCAINE 1 PATCH: 40 CREAM TOPICAL at 11:35

## 2024-12-10 RX ADMIN — MEMANTINE HYDROCHLORIDE 10 MILLIGRAM(S): 14 CAPSULE, EXTENDED RELEASE ORAL at 11:34

## 2024-12-10 RX ADMIN — METOPROLOL TARTRATE 12.5 MILLIGRAM(S): 100 TABLET, FILM COATED ORAL at 05:54

## 2024-12-10 RX ADMIN — Medication 5000 UNIT(S): at 17:52

## 2024-12-10 RX ADMIN — OXYCODONE HYDROCHLORIDE 2.5 MILLIGRAM(S): 30 TABLET ORAL at 08:55

## 2024-12-10 RX ADMIN — BUMETANIDE 1 MILLIGRAM(S): 1 TABLET ORAL at 05:55

## 2024-12-10 RX ADMIN — Medication 75 MICROGRAM(S): at 05:26

## 2024-12-10 RX ADMIN — OXYCODONE HYDROCHLORIDE 2.5 MILLIGRAM(S): 30 TABLET ORAL at 09:55

## 2024-12-10 RX ADMIN — ACETAMINOPHEN 500MG 975 MILLIGRAM(S): 500 TABLET, COATED ORAL at 11:35

## 2024-12-10 RX ADMIN — Medication 30 MILLIGRAM(S): at 11:35

## 2024-12-10 RX ADMIN — DAPAGLIFLOZIN 10 MILLIGRAM(S): 10 TABLET, FILM COATED ORAL at 17:52

## 2024-12-10 RX ADMIN — Medication 1 TABLET(S): at 11:35

## 2024-12-10 RX ADMIN — Medication 81 MILLIGRAM(S): at 11:35

## 2024-12-10 RX ADMIN — ACETAMINOPHEN 500MG 975 MILLIGRAM(S): 500 TABLET, COATED ORAL at 21:49

## 2024-12-10 RX ADMIN — LIDOCAINE 1 PATCH: 40 CREAM TOPICAL at 19:29

## 2024-12-10 RX ADMIN — SACUBITRIL AND VALSARTAN 1 TABLET(S): 24; 26 TABLET, FILM COATED ORAL at 17:53

## 2024-12-10 RX ADMIN — OXYCODONE HYDROCHLORIDE 5 MILLIGRAM(S): 30 TABLET ORAL at 18:53

## 2024-12-10 RX ADMIN — Medication 2 TABLET(S): at 21:48

## 2024-12-10 RX ADMIN — Medication 5000 UNIT(S): at 21:48

## 2024-12-10 RX ADMIN — OXYCODONE HYDROCHLORIDE 5 MILLIGRAM(S): 30 TABLET ORAL at 17:53

## 2024-12-10 RX ADMIN — POLYETHYLENE GLYCOL 3350 17 GRAM(S): 17 POWDER, FOR SOLUTION ORAL at 11:35

## 2024-12-10 RX ADMIN — ACETAMINOPHEN 500MG 975 MILLIGRAM(S): 500 TABLET, COATED ORAL at 19:29

## 2024-12-10 RX ADMIN — Medication 5000 UNIT(S): at 05:54

## 2024-12-11 RX ORDER — ACETAMINOPHEN 500MG 500 MG/1
650 TABLET, COATED ORAL EVERY 6 HOURS
Refills: 0 | Status: DISCONTINUED | OUTPATIENT
Start: 2024-12-11 | End: 2024-12-13

## 2024-12-11 RX ADMIN — Medication 1 TABLET(S): at 08:55

## 2024-12-11 RX ADMIN — OXYCODONE HYDROCHLORIDE 5 MILLIGRAM(S): 30 TABLET ORAL at 21:02

## 2024-12-11 RX ADMIN — Medication 30 MILLIGRAM(S): at 08:56

## 2024-12-11 RX ADMIN — Medication 2 TABLET(S): at 21:03

## 2024-12-11 RX ADMIN — DAPAGLIFLOZIN 10 MILLIGRAM(S): 10 TABLET, FILM COATED ORAL at 08:55

## 2024-12-11 RX ADMIN — Medication 5000 UNIT(S): at 05:58

## 2024-12-11 RX ADMIN — METOPROLOL TARTRATE 12.5 MILLIGRAM(S): 100 TABLET, FILM COATED ORAL at 05:58

## 2024-12-11 RX ADMIN — METOPROLOL TARTRATE 12.5 MILLIGRAM(S): 100 TABLET, FILM COATED ORAL at 17:44

## 2024-12-11 RX ADMIN — Medication 75 MICROGRAM(S): at 05:18

## 2024-12-11 RX ADMIN — LIDOCAINE 1 PATCH: 40 CREAM TOPICAL at 11:23

## 2024-12-11 RX ADMIN — OXYCODONE HYDROCHLORIDE 5 MILLIGRAM(S): 30 TABLET ORAL at 22:02

## 2024-12-11 RX ADMIN — SACUBITRIL AND VALSARTAN 1 TABLET(S): 24; 26 TABLET, FILM COATED ORAL at 17:44

## 2024-12-11 RX ADMIN — Medication 81 MILLIGRAM(S): at 08:56

## 2024-12-11 RX ADMIN — BUMETANIDE 1 MILLIGRAM(S): 1 TABLET ORAL at 05:58

## 2024-12-11 RX ADMIN — Medication 5000 UNIT(S): at 13:12

## 2024-12-11 RX ADMIN — SACUBITRIL AND VALSARTAN 1 TABLET(S): 24; 26 TABLET, FILM COATED ORAL at 05:58

## 2024-12-11 RX ADMIN — OXYCODONE HYDROCHLORIDE 5 MILLIGRAM(S): 30 TABLET ORAL at 08:54

## 2024-12-11 RX ADMIN — Medication 5000 UNIT(S): at 21:04

## 2024-12-11 RX ADMIN — MEMANTINE HYDROCHLORIDE 10 MILLIGRAM(S): 14 CAPSULE, EXTENDED RELEASE ORAL at 08:56

## 2024-12-11 RX ADMIN — ACETAMINOPHEN 500MG 975 MILLIGRAM(S): 500 TABLET, COATED ORAL at 05:57

## 2024-12-12 RX ORDER — OXYCODONE HYDROCHLORIDE 30 MG/1
5 TABLET ORAL EVERY 4 HOURS
Refills: 0 | Status: DISCONTINUED | OUTPATIENT
Start: 2024-12-12 | End: 2024-12-13

## 2024-12-12 RX ORDER — OXYCODONE HYDROCHLORIDE 30 MG/1
2.5 TABLET ORAL EVERY 4 HOURS
Refills: 0 | Status: DISCONTINUED | OUTPATIENT
Start: 2024-12-12 | End: 2024-12-13

## 2024-12-12 RX ADMIN — METOPROLOL TARTRATE 12.5 MILLIGRAM(S): 100 TABLET, FILM COATED ORAL at 16:41

## 2024-12-12 RX ADMIN — BUMETANIDE 1 MILLIGRAM(S): 1 TABLET ORAL at 05:40

## 2024-12-12 RX ADMIN — Medication 75 MICROGRAM(S): at 05:15

## 2024-12-12 RX ADMIN — Medication 1 TABLET(S): at 10:45

## 2024-12-12 RX ADMIN — METOPROLOL TARTRATE 12.5 MILLIGRAM(S): 100 TABLET, FILM COATED ORAL at 05:40

## 2024-12-12 RX ADMIN — Medication 2 TABLET(S): at 21:37

## 2024-12-12 RX ADMIN — MEMANTINE HYDROCHLORIDE 10 MILLIGRAM(S): 14 CAPSULE, EXTENDED RELEASE ORAL at 10:45

## 2024-12-12 RX ADMIN — Medication 5000 UNIT(S): at 05:40

## 2024-12-12 RX ADMIN — OXYCODONE HYDROCHLORIDE 5 MILLIGRAM(S): 30 TABLET ORAL at 10:44

## 2024-12-12 RX ADMIN — Medication 5000 UNIT(S): at 13:41

## 2024-12-12 RX ADMIN — SACUBITRIL AND VALSARTAN 1 TABLET(S): 24; 26 TABLET, FILM COATED ORAL at 05:40

## 2024-12-12 RX ADMIN — Medication 5000 UNIT(S): at 21:37

## 2024-12-12 RX ADMIN — LIDOCAINE 1 PATCH: 40 CREAM TOPICAL at 22:28

## 2024-12-12 RX ADMIN — SACUBITRIL AND VALSARTAN 1 TABLET(S): 24; 26 TABLET, FILM COATED ORAL at 16:41

## 2024-12-12 RX ADMIN — OXYCODONE HYDROCHLORIDE 5 MILLIGRAM(S): 30 TABLET ORAL at 11:44

## 2024-12-12 RX ADMIN — LIDOCAINE 1 PATCH: 40 CREAM TOPICAL at 19:27

## 2024-12-12 RX ADMIN — Medication 30 MILLIGRAM(S): at 10:45

## 2024-12-12 RX ADMIN — Medication 81 MILLIGRAM(S): at 10:45

## 2024-12-12 RX ADMIN — LIDOCAINE 1 PATCH: 40 CREAM TOPICAL at 10:44

## 2024-12-12 RX ADMIN — POLYETHYLENE GLYCOL 3350 17 GRAM(S): 17 POWDER, FOR SOLUTION ORAL at 10:44

## 2024-12-12 RX ADMIN — DAPAGLIFLOZIN 10 MILLIGRAM(S): 10 TABLET, FILM COATED ORAL at 10:45

## 2024-12-13 ENCOUNTER — TRANSCRIPTION ENCOUNTER (OUTPATIENT)
Age: 84
End: 2024-12-13

## 2024-12-13 VITALS
OXYGEN SATURATION: 98 % | SYSTOLIC BLOOD PRESSURE: 130 MMHG | DIASTOLIC BLOOD PRESSURE: 75 MMHG | HEART RATE: 80 BPM | RESPIRATION RATE: 18 BRPM | TEMPERATURE: 98 F

## 2024-12-13 RX ORDER — FINERENONE 10 MG/1
1 TABLET, FILM COATED ORAL
Refills: 0 | DISCHARGE

## 2024-12-13 RX ADMIN — POLYETHYLENE GLYCOL 3350 17 GRAM(S): 17 POWDER, FOR SOLUTION ORAL at 11:12

## 2024-12-13 RX ADMIN — OXYCODONE HYDROCHLORIDE 5 MILLIGRAM(S): 30 TABLET ORAL at 02:37

## 2024-12-13 RX ADMIN — DAPAGLIFLOZIN 10 MILLIGRAM(S): 10 TABLET, FILM COATED ORAL at 11:13

## 2024-12-13 RX ADMIN — OXYCODONE HYDROCHLORIDE 5 MILLIGRAM(S): 30 TABLET ORAL at 03:37

## 2024-12-13 RX ADMIN — SACUBITRIL AND VALSARTAN 1 TABLET(S): 24; 26 TABLET, FILM COATED ORAL at 05:59

## 2024-12-13 RX ADMIN — Medication 75 MICROGRAM(S): at 04:12

## 2024-12-13 RX ADMIN — METOPROLOL TARTRATE 12.5 MILLIGRAM(S): 100 TABLET, FILM COATED ORAL at 05:59

## 2024-12-13 RX ADMIN — Medication 1 TABLET(S): at 11:13

## 2024-12-13 RX ADMIN — Medication 81 MILLIGRAM(S): at 11:13

## 2024-12-13 RX ADMIN — OXYCODONE HYDROCHLORIDE 5 MILLIGRAM(S): 30 TABLET ORAL at 11:12

## 2024-12-13 RX ADMIN — LIDOCAINE 1 PATCH: 40 CREAM TOPICAL at 11:12

## 2024-12-13 RX ADMIN — Medication 5000 UNIT(S): at 05:58

## 2024-12-13 RX ADMIN — BUMETANIDE 1 MILLIGRAM(S): 1 TABLET ORAL at 05:59

## 2024-12-13 RX ADMIN — Medication 30 MILLIGRAM(S): at 11:14

## 2024-12-13 RX ADMIN — MEMANTINE HYDROCHLORIDE 10 MILLIGRAM(S): 14 CAPSULE, EXTENDED RELEASE ORAL at 11:13

## 2024-12-13 NOTE — DISCHARGE NOTE NURSING/CASE MANAGEMENT/SOCIAL WORK - FINANCIAL ASSISTANCE
[facility] provides services at a reduced cost to those who are determined to be eligible through [facility]’s financial assistance program. Information regarding [facility]’s financial assistance program can be found by going to [email or web address] or by calling [phone].

## 2024-12-13 NOTE — PROGRESS NOTE ADULT - PROVIDER SPECIALTY LIST ADULT
Cardiology
Cardiology
Internal Medicine
Internal Medicine
LAST SEEN ON 7/31 MED LAST ORDERED BY DORIE.AVINASH   
Nephrology
Internal Medicine
Internal Medicine
Nephrology
Nephrology
Cardiology
Cardiology
Internal Medicine
Internal Medicine
Nephrology

## 2024-12-13 NOTE — PROGRESS NOTE ADULT - REASON FOR ADMISSION
R hip pain w/ difficulty ambulating

## 2024-12-13 NOTE — PROGRESS NOTE ADULT - ASSESSMENT
84F with PMH HTN, T2DM, CHF, hypothryoidism, CKD3 presents with R hip pain ongoing for several months without associated trauma or injury causing difficulty with ambulation.         Problem/Plan - 1:  ·  Problem: Right hip pain.   ·  Plan: -pt reporting several month history of R hip pain now reporting that the pain is affecting her ability to ambulate.  -Denied any previous hip procedures however CT of hip showing R side prosthesis  -denied any radiation of pain, likely degenerative given chronicity and alleged prev hip replacement.  -would obtain further collateral from patient's granddaughter in AM.                   -PT consult ordered  -PO tylenol for mild pain, can give IV tylenol for moderate pain q6 and oxy 5 for severe pain, however would trend LFT's. If continuing to increase hold tylenol  -c/w lidocaine patch daily.     Problem/Plan - 2:  ·  Problem: Stage 3 chronic kidney disease.   ·  Plan: -pt. with hx CKD3  -nephro following  -avoid nephrotoxins and renally dose medications.     Problem/Plan - 3:  ·  Problem: Hypertension.   ·  Plan: -pt. reportedly on antihypertensives at home however cannot remember medications  -monitor BP and readings fine.      Problem/Plan - 4:  ·  Problem: Diabetes mellitus.   ·  Plan: -pt with reported history of DM however cannot recall meds  -Sugars fine so negative need for medications.   -glucose levels adequate while inpatient, will defer LDISS at this time however continue fingersticks before meals and bedtime  -f/u A1C.     Problem/Plan - 5:  ·  Problem: Hypothyroid.   ·  Plan: -pt allegedly with history of hypothyroidism, will get TSH with AM labs  -determine levothyroxine dosing if any and continue PO.     Problem/Plan - 6:  ·  Problem: Transaminitis.   ·  Plan: -pt. with mildly elevated LFT's  -Rpt lft  ok.     Problem/Plan - 7:  ·  Problem: Compensated diastolic chf .   ·  Plan: - Continue home meds.    cardiology help appreciated.     Problem/Plan - 8:  ·  Problem: Preventive measure.   ·  Plan: Diet: DASH/CC  DVT: Heparin subq.    Dispo: Pending placement . D/W grand daughter.     
85yo F with PMH of HTN, T2DM, CHF, hypothyroidism, and CKD 3 c/o R hip pain x1 week.  She denies fall, trauma, or injury to R hip.  Seen by her PCP on 12/4 and had x-ray of the hip performed; results pending.  Pt reports she has been taking Tylenol at home with minimal relief.  Able to bear wgt but walking with cane.  Nephrology consulted for CKD.      A/P  CKD 3:  Established office pt.  Baseline S.Cr. 1.1-1.3.  Renal function stable, at baseline.  On Bumex 1mg qd.  Monitor BMP and UO.  Avoid nephrotoxins / NSAIDs if possible.    HTN:  BP controlled.  Resume home meds.  Monitor BP.    CKD - MBD:  Check PTH.  Monitor PO4 and Ca daily.    Hip Pain:  Workup and management per primary team.
83yo F with PMH of HTN, T2DM, CHF, hypothyroidism, and CKD 3 c/o R hip pain x1 week.  She denies fall, trauma, or injury to R hip.  Seen by her PCP on 12/4 and had x-ray of the hip performed; results pending.  Pt reports she has been taking Tylenol at home with minimal relief.  Able to bear wgt but walking with cane.  Nephrology consulted for CKD.      A/P  CKD 3:  Established office pt.  Baseline S.Cr. 1.1-1.3.  Renal function stable, at baseline.  On Bumex 1mg qd.  Monitor BMP and UO.  Avoid nephrotoxins / NSAIDs if possible.    HTN:  BP controlled.  Resume home meds.  Monitor BP.    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    Hip Pain:  Workup and management per primary team.
83yo F with PMH of HTN, T2DM, CHF, hypothyroidism, and CKD 3 c/o R hip pain x1 week.  She denies fall, trauma, or injury to R hip.  Seen by her PCP on 12/4 and had x-ray of the hip performed; results pending.  Pt reports she has been taking Tylenol at home with minimal relief.  Able to bear wgt but walking with cane.  Nephrology consulted for CKD.      A/P  CKD 3:  Established office pt.  Baseline S.Cr. 1.1-1.3.  Renal function stable, at baseline.  On Bumex 1mg qd.  Monitor BMP and UO.  Avoid nephrotoxins / NSAIDs if possible.    HTN:  BP controlled.  Resume home meds.  Monitor BP.    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    Hip Pain:  Workup and management per primary team.
84F with PMH HTN, T2DM, CHF, hypothryoidism, CKD3 presents with R hip pain ongoing for several months without associated trauma or injury causing difficulty with ambulation.         Problem/Plan - 1:  ·  Problem: Right hip pain.   ·  Plan: -pt reporting several month history of R hip pain now reporting that the pain is affecting her ability to ambulate.  -Denied any previous hip procedures however CT of hip showing R side prosthesis  -denied any radiation of pain, likely degenerative given chronicity and alleged prev hip replacement.  -would obtain further collateral from patient's granddaughter in AM.                   -PT consult ordered  -PO tylenol for mild pain, can give IV tylenol for moderate pain q6 and oxy 5 for severe pain, however would trend LFT's. If continuing to increase hold tylenol  -c/w lidocaine patch daily.     Problem/Plan - 2:  ·  Problem: Stage 3 chronic kidney disease.   ·  Plan: -pt. with hx CKD3  -nephro following  -avoid nephrotoxins and renally dose medications.     Problem/Plan - 3:  ·  Problem: Hypertension.   ·  Plan: -pt. reportedly on antihypertensives at home however cannot remember medications  -monitor BP and readings fine.      Problem/Plan - 4:  ·  Problem: Diabetes mellitus.   ·  Plan: -pt with reported history of DM however cannot recall meds  -Sugars fine so negative need for medications.   -glucose levels adequate while inpatient, will defer LDISS at this time however continue fingersticks before meals and bedtime  -f/u A1C.     Problem/Plan - 5:  ·  Problem: Hypothyroid.   ·  Plan: -pt allegedly with history of hypothyroidism, will get TSH with AM labs  -determine levothyroxine dosing if any and continue PO.     Problem/Plan - 6:  ·  Problem: Transaminitis.   ·  Plan: -pt. with mildly elevated LFT's  -Rpt lft  ok.     Problem/Plan - 7:  ·  Problem: Compensated diastolic chf .   ·  Plan: - Continue home meds.    cardiology help appreciated.     Problem/Plan - 8:  ·  Problem: Preventive measure.   ·  Plan: Diet: DASH/CC  DVT: Heparin subq.    Dispo: DC planning today  .         
84F with PMH HTN, T2DM, CHF, hypothryoidism, CKD3 presents with R hip pain ongoing for several months without associated trauma or injury causing difficulty with ambulation.         Problem/Plan - 1:  ·  Problem: Right hip pain.   ·  Plan: -pt reporting several month history of R hip pain now reporting that the pain is affecting her ability to ambulate.  -Denied any previous hip procedures however CT of hip showing R side prosthesis  -denied any radiation of pain, likely degenerative given chronicity and alleged prev hip replacement.  -would obtain further collateral from patient's granddaughter in AM.                   -PT consult ordered  -PO tylenol for mild pain, can give IV tylenol for moderate pain q6 and oxy 5 for severe pain, however would trend LFT's. If continuing to increase hold tylenol  -c/w lidocaine patch daily.     Problem/Plan - 2:  ·  Problem: Stage 3 chronic kidney disease.   ·  Plan: -pt. with hx CKD3  -nephro following  -avoid nephrotoxins and renally dose medications.     Problem/Plan - 3:  ·  Problem: Hypertension.   ·  Plan: -pt. reportedly on antihypertensives at home however cannot remember medications  -monitor BP and readings fine.      Problem/Plan - 4:  ·  Problem: Diabetes mellitus.   ·  Plan: -pt with reported history of DM however cannot recall meds  -Sugars fine so negative need for medications.   -glucose levels adequate while inpatient, will defer LDISS at this time however continue fingersticks before meals and bedtime  -f/u A1C.     Problem/Plan - 5:  ·  Problem: Hypothyroid.   ·  Plan: -pt allegedly with history of hypothyroidism, will get TSH with AM labs  -determine levothyroxine dosing if any and continue PO.     Problem/Plan - 6:  ·  Problem: Transaminitis.   ·  Plan: -pt. with mildly elevated LFT's  -Rpt lft  ok.     Problem/Plan - 7:  ·  Problem: Compensated diastolic chf .   ·  Plan: - Continue home meds.    cardiology help appreciated.     Problem/Plan - 8:  ·  Problem: Preventive measure.   ·  Plan: Diet: DASH/CC  DVT: Heparin subq.    Dispo: Pending placement . D/W grand daughter yesterday .     
83yo F with PMH of HTN, T2DM, CHF, hypothyroidism, and CKD 3 c/o R hip pain x1 week.  She denies fall, trauma, or injury to R hip.  Seen by her PCP on 12/4 and had x-ray of the hip performed; results pending.  Pt reports she has been taking Tylenol at home with minimal relief.  Able to bear wgt but walking with cane.  Nephrology consulted for CKD.      A/P  CKD 3:  Established office pt.  Baseline S.Cr. 1.1-1.3.  Renal function stable, at baseline.  On Bumex 1mg qd.  Monitor BMP and UO.  Avoid nephrotoxins / NSAIDs if possible.    HTN:  BP controlled.  Resume home meds.  Monitor BP.    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    Hip Pain:  Workup and management per primary team.
84F with PMH HTN, T2DM, CHF, hypothryoidism, CKD3 presents with R hip pain ongoing for several months without associated trauma or injury causing difficulty with ambulation.         Problem/Plan - 1:  ·  Problem: Right hip pain.   ·  Plan: -pt reporting several month history of R hip pain now reporting that the pain is affecting her ability to ambulate.  -Denied any previous hip procedures however CT of hip showing R side prosthesis  -denied any radiation of pain, likely degenerative given chronicity and alleged prev hip replacement.  -would obtain further collateral from patient's granddaughter in AM.                   -PT consult ordered  -PO tylenol for mild pain, can give IV tylenol for moderate pain q6 and oxy 5 for severe pain, however would trend LFT's. If continuing to increase hold tylenol  -c/w lidocaine patch daily.     Problem/Plan - 2:  ·  Problem: Stage 3 chronic kidney disease.   ·  Plan: -pt. with hx CKD3  -nephro following  -avoid nephrotoxins and renally dose medications.     Problem/Plan - 3:  ·  Problem: Hypertension.   ·  Plan: -pt. reportedly on antihypertensives at home however cannot remember medications  -monitor BP and readings fine.      Problem/Plan - 4:  ·  Problem: Diabetes mellitus.   ·  Plan: -pt with reported history of DM however cannot recall meds  -Sugars fine so negative need for medications.   -glucose levels adequate while inpatient, will defer LDISS at this time however continue fingersticks before meals and bedtime  -f/u A1C.     Problem/Plan - 5:  ·  Problem: Hypothyroid.   ·  Plan: -pt allegedly with history of hypothyroidism, will get TSH with AM labs  -determine levothyroxine dosing if any and continue PO.     Problem/Plan - 6:  ·  Problem: Transaminitis.   ·  Plan: -pt. with mildly elevated LFT's  -Rpt lft  ok.     Problem/Plan - 7:  ·  Problem: Compensated diastolic chf .   ·  Plan: - Continue home meds.    cardiology help appreciated.     Problem/Plan - 8:  ·  Problem: Preventive measure.   ·  Plan: Diet: DASH/CC  DVT: Heparin subq.    Dispo: DC planning Pending placement .             
84F with PMH HTN, T2DM, CHF, hypothryoidism, CKD3 presents with R hip pain ongoing for several months without associated trauma or injury causing difficulty with ambulation.         Problem/Plan - 1:  ·  Problem: Right hip pain.   ·  Plan: -pt reporting several month history of R hip pain now reporting that the pain is affecting her ability to ambulate.  -Denied any previous hip procedures however CT of hip showing R side prosthesis  -denied any radiation of pain, likely degenerative given chronicity and alleged prev hip replacement.  -would obtain further collateral from patient's granddaughter in AM.                   -PT consult ordered  -PO tylenol for mild pain, can give IV tylenol for moderate pain q6 and oxy 5 for severe pain, however would trend LFT's. If continuing to increase hold tylenol  -c/w lidocaine patch daily.     Problem/Plan - 2:  ·  Problem: Stage 3 chronic kidney disease.   ·  Plan: -pt. with hx CKD3  -nephro following  -avoid nephrotoxins and renally dose medications.     Problem/Plan - 3:  ·  Problem: Hypertension.   ·  Plan: -pt. reportedly on antihypertensives at home however cannot remember medications  -monitor BP in interim.     Problem/Plan - 4:  ·  Problem: Diabetes mellitus.   ·  Plan: -pt with reported history of DM however cannot recall meds  -A1C in AM  -glucose levels adequate while inpatient, will defer LDISS at this time however continue fingersticks before meals and bedtime  -f/u A1C.     Problem/Plan - 5:  ·  Problem: Hypothyroid.   ·  Plan: -pt allegedly with history of hypothyroidism, will get TSH with AM labs  -determine levothyroxine dosing if any and continue PO.     Problem/Plan - 6:  ·  Problem: Transaminitis.   ·  Plan: -pt. with mildly elevated LFT's  -would trend with CMP in AM, if continuing to rise would  get RUQ ultrasound to assess for structural etiology.     Problem/Plan - 7:  ·  Problem: Compensated diastolic chf .   ·  Plan: - Continue home meds.    cardiology help appreciated.     Problem/Plan - 8:  ·  Problem: Preventive measure.   ·  Plan: Diet: DASH/CC  DVT: Heparin subq  Dispo: Pending.    
85yo F with PMH of HTN, T2DM, CHF, hypothyroidism, and CKD 3 c/o R hip pain x1 week.  She denies fall, trauma, or injury to R hip.  Seen by her PCP on 12/4 and had x-ray of the hip performed; results pending.  Pt reports she has been taking Tylenol at home with minimal relief.  Able to bear wgt but walking with cane.  Nephrology consulted for CKD.      A/P  CKD 3:  Established office pt.  Baseline S.Cr. 1.1-1.3.  Renal function stable, at baseline.  On Bumex 1mg qd.  Monitor BMP and UO.  Avoid nephrotoxins / NSAIDs if possible.    HTN:  BP controlled.  Resume home meds.  Monitor BP.    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    Hip Pain:  Workup and management per primary team.
85yo F with PMH of HTN, T2DM, CHF, hypothyroidism, and CKD 3 c/o R hip pain x1 week.  She denies fall, trauma, or injury to R hip.  Seen by her PCP on 12/4 and had x-ray of the hip performed; results pending.  Pt reports she has been taking Tylenol at home with minimal relief.  Able to bear wgt but walking with cane.  Nephrology consulted for CKD.      A/P  CKD 3:  Established office pt.  Baseline S.Cr. 1.1-1.3.  Renal function stable, at baseline.  On Bumex 1mg qd.  Monitor BMP and UO.  Avoid nephrotoxins / NSAIDs if possible.    HTN:  BP controlled.  Resume home meds.  Monitor BP.    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    Hip Pain:  Workup and management per primary team.
84F with PMH HTN, T2DM, CHF, hypothryoidism, CKD3 presents with R hip pain ongoing for several months without associated trauma or injury causing difficulty with ambulation.         Problem/Plan - 1:  ·  Problem: Right hip pain.   ·  Plan: -pt reporting several month history of R hip pain now reporting that the pain is affecting her ability to ambulate.  -Denied any previous hip procedures however CT of hip showing R side prosthesis  -denied any radiation of pain, likely degenerative given chronicity and alleged prev hip replacement.  -would obtain further collateral from patient's granddaughter in AM.                   -PT consult ordered  -PO tylenol for mild pain, can give IV tylenol for moderate pain q6 and oxy 5 for severe pain, however would trend LFT's. If continuing to increase hold tylenol  -c/w lidocaine patch daily.     Problem/Plan - 2:  ·  Problem: Stage 3 chronic kidney disease.   ·  Plan: -pt. with hx CKD3  -nephro following  -avoid nephrotoxins and renally dose medications.     Problem/Plan - 3:  ·  Problem: Hypertension.   ·  Plan: -pt. reportedly on antihypertensives at home however cannot remember medications  -monitor BP and readings fine.      Problem/Plan - 4:  ·  Problem: Diabetes mellitus.   ·  Plan: -pt with reported history of DM however cannot recall meds  -Sugars fine so negative need for medications.   -glucose levels adequate while inpatient, will defer LDISS at this time however continue fingersticks before meals and bedtime  -f/u A1C.     Problem/Plan - 5:  ·  Problem: Hypothyroid.   ·  Plan: -pt allegedly with history of hypothyroidism, will get TSH with AM labs  -determine levothyroxine dosing if any and continue PO.     Problem/Plan - 6:  ·  Problem: Transaminitis.   ·  Plan: -pt. with mildly elevated LFT's  -would trend with CMP in AM, if continuing to rise would  get RUQ ultrasound to assess for structural etiology.     Problem/Plan - 7:  ·  Problem: Compensated diastolic chf .   ·  Plan: - Continue home meds.    cardiology help appreciated.     Problem/Plan - 8:  ·  Problem: Preventive measure.   ·  Plan: Diet: DASH/CC  DVT: Heparin subq.    Dispo: Pending pain control and ambulation..

## 2024-12-13 NOTE — PROGRESS NOTE ADULT - SUBJECTIVE AND OBJECTIVE BOX
Cardiovascular Disease Progress Note  DATE OF SERVICE: 12-12-24 @ 10:05    Overnight events: No acute events overnight.    The patient denies pain.   Otherwise review of systems negative    Objective Findings:  T(C): 36.5 (12-12-24 @ 05:30), Max: 36.9 (12-12-24 @ 04:00)  HR: 73 (12-12-24 @ 05:30) (70 - 81)  BP: 121/71 (12-12-24 @ 05:30) (117/71 - 136/75)  RR: 18 (12-12-24 @ 05:30) (17 - 18)  SpO2: 96% (12-12-24 @ 05:30) (96% - 97%)  Wt(kg): --  Daily     Daily       Physical Exam:  Gen: NAD; Patient resting comfortably  HEENT: EOMI, Normocephalic/ atraumatic  CV: RRR, normal S1 + S2, no m/r/g  Lungs:  Normal respiratory effort; clear to auscultation bilaterally  Abd: soft, non-tender; bowel sounds present  Ext: No edema; warm and well perfused    Telemetry: n/a    Laboratory Data:    No recent labs      Inpatient Medications:  MEDICATIONS  (STANDING):  aspirin enteric coated 81 milliGRAM(s) Oral daily  buMETAnide 1 milliGRAM(s) Oral daily  dapagliflozin 10 milliGRAM(s) Oral daily  DULoxetine 30 milliGRAM(s) Oral daily  heparin   Injectable 5000 Unit(s) SubCutaneous every 8 hours  influenza  Vaccine (HIGH DOSE) 0.5 milliLiter(s) IntraMuscular once  levothyroxine 75 MICROGram(s) Oral daily  lidocaine   4% Patch 1 Patch Transdermal daily  memantine 10 milliGRAM(s) Oral daily  metoprolol tartrate 12.5 milliGRAM(s) Oral two times a day  multivitamin 1 Tablet(s) Oral daily  naloxone Injectable 0.4 milliGRAM(s) IV Push once  polyethylene glycol 3350 17 Gram(s) Oral daily  sacubitril 49 mG/valsartan 51 mG 1 Tablet(s) Oral two times a day  senna 2 Tablet(s) Oral at bedtime      Assessment: 84 year old woman with HTN, Hypothyroidism, OA, Obesity and Type-2 DM presents with R hip pain.     Plan of Care:     #Compensated diastolic CHF-  Recent echo notable for advanced diastolic dysfunction with possible infiltrative cardiomyopathy.  Low voltage also noted on EKG.  Given that the patient is doing well with medical therapy, I would not pursue a cardiac amyloidosis work up.  Patient is not fluid overloaded.  Continue oral Bumex.       #HTN-  BP acceptable.               Over 55 minutes spent on total encounter; more than 50% of the visit was spent counseling and/or coordinating care by the attending physician.      Boby Browne MD [facility]  Cardiovascular Disease  [phone]
Date of Service  : 12-09-24    INTERVAL HPI/OVERNIGHT EVENTS: I feel better.   Vital Signs Last 24 Hrs  T(C): 36.3 (09 Dec 2024 06:30), Max: 36.8 (08 Dec 2024 20:28)  T(F): 97.4 (09 Dec 2024 06:30), Max: 98.3 (08 Dec 2024 20:28)  HR: 70 (09 Dec 2024 06:30) (70 - 86)  BP: 120/67 (09 Dec 2024 06:30) (120/67 - 138/86)  BP(mean): --  RR: 18 (09 Dec 2024 06:30) (16 - 18)  SpO2: 98% (09 Dec 2024 06:30) (98% - 99%)    Parameters below as of 09 Dec 2024 06:30  Patient On (Oxygen Delivery Method): room air      I&O's Summary    MEDICATIONS  (STANDING):  aspirin enteric coated 81 milliGRAM(s) Oral daily  buMETAnide 1 milliGRAM(s) Oral daily  dapagliflozin 10 milliGRAM(s) Oral daily  DULoxetine 30 milliGRAM(s) Oral daily  heparin   Injectable 5000 Unit(s) SubCutaneous every 8 hours  influenza  Vaccine (HIGH DOSE) 0.5 milliLiter(s) IntraMuscular once  levothyroxine 75 MICROGram(s) Oral daily  lidocaine   4% Patch 1 Patch Transdermal daily  memantine 10 milliGRAM(s) Oral daily  metoprolol tartrate 12.5 milliGRAM(s) Oral two times a day  multivitamin 1 Tablet(s) Oral daily  naloxone Injectable 0.4 milliGRAM(s) IV Push once  polyethylene glycol 3350 17 Gram(s) Oral daily  sacubitril 49 mG/valsartan 51 mG 1 Tablet(s) Oral two times a day  senna 2 Tablet(s) Oral at bedtime    MEDICATIONS  (PRN):  bisacodyl 5 milliGRAM(s) Oral daily PRN Constipation  oxyCODONE    IR 2.5 milliGRAM(s) Oral every 4 hours PRN Moderate Pain (4 - 6)  oxyCODONE    IR 5 milliGRAM(s) Oral every 4 hours PRN Severe Pain (7 - 10)    LABS:                        14.8   4.61  )-----------( 206      ( 09 Dec 2024 07:35 )             46.7     12-09    137  |  100  |  19  ----------------------------<  105[H]  4.0   |  23  |  1.08    Ca    9.2      09 Dec 2024 07:35    TPro  6.7  /  Alb  3.6  /  TBili  0.8  /  DBili  x   /  AST  25  /  ALT  38[H]  /  AlkPhos  77  12-08      Urinalysis Basic - ( 09 Dec 2024 07:35 )    Color: x / Appearance: x / SG: x / pH: x  Gluc: 105 mg/dL / Ketone: x  / Bili: x / Urobili: x   Blood: x / Protein: x / Nitrite: x   Leuk Esterase: x / RBC: x / WBC x   Sq Epi: x / Non Sq Epi: x / Bacteria: x      CAPILLARY BLOOD GLUCOSE            Urinalysis Basic - ( 09 Dec 2024 07:35 )    Color: x / Appearance: x / SG: x / pH: x  Gluc: 105 mg/dL / Ketone: x  / Bili: x / Urobili: x   Blood: x / Protein: x / Nitrite: x   Leuk Esterase: x / RBC: x / WBC x   Sq Epi: x / Non Sq Epi: x / Bacteria: x          Consultant(s) Notes Reviewed:  [x ] YES  [ ] NO    PHYSICAL EXAM:  GENERAL: NAD, well-groomed, well-developed,not in any distress ,  HEAD:  Atraumatic, Normocephalic  EYES: EOMI, PERRLA, conjunctiva and sclera clear  ENMT: No tonsillar erythema, exudates, or enlargement; Moist mucous membranes, Good dentition, No lesions  NECK: Supple, No JVD, Normal thyroid  NERVOUS SYSTEM:  Alert & No focal deficit   CHEST/LUNG: Good air entry bilateral with no  rales, rhonchi, wheezing, or rubs  HEART: Regular rate and rhythm; No murmurs, rubs, or gallops  ABDOMEN: Soft, Nontender, Nondistended; Bowel sounds present  EXTREMITIES:  2+ Peripheral Pulses, No clubbing, cyanosis, or edema    Care Discussed with Consultants/Other Providers [ x] YES  [ ] NO
Date of Service  : 12-13-24     INTERVAL HPI/OVERNIGHT EVENTS: I feel fine.   Vital Signs Last 24 Hrs  T(C): 36.7 (13 Dec 2024 11:17), Max: 36.7 (12 Dec 2024 16:44)  T(F): 98 (13 Dec 2024 11:17), Max: 98 (12 Dec 2024 16:44)  HR: 80 (13 Dec 2024 11:17) (71 - 80)  BP: 130/75 (13 Dec 2024 11:17) (114/71 - 130/75)  BP(mean): --  RR: 18 (13 Dec 2024 11:17) (18 - 18)  SpO2: 98% (13 Dec 2024 11:17) (97% - 98%)    Parameters below as of 13 Dec 2024 11:17  Patient On (Oxygen Delivery Method): room air      I&O's Summary    MEDICATIONS  (STANDING):  aspirin enteric coated 81 milliGRAM(s) Oral daily  buMETAnide 1 milliGRAM(s) Oral daily  dapagliflozin 10 milliGRAM(s) Oral daily  DULoxetine 30 milliGRAM(s) Oral daily  heparin   Injectable 5000 Unit(s) SubCutaneous every 8 hours  influenza  Vaccine (HIGH DOSE) 0.5 milliLiter(s) IntraMuscular once  insulin lispro (ADMELOG) corrective regimen sliding scale   SubCutaneous three times a day before meals  levothyroxine 75 MICROGram(s) Oral daily  lidocaine   4% Patch 1 Patch Transdermal daily  memantine 10 milliGRAM(s) Oral daily  metoprolol tartrate 12.5 milliGRAM(s) Oral two times a day  multivitamin 1 Tablet(s) Oral daily  naloxone Injectable 0.4 milliGRAM(s) IV Push once  polyethylene glycol 3350 17 Gram(s) Oral daily  sacubitril 49 mG/valsartan 51 mG 1 Tablet(s) Oral two times a day  senna 2 Tablet(s) Oral at bedtime    MEDICATIONS  (PRN):  acetaminophen     Tablet .. 650 milliGRAM(s) Oral every 6 hours PRN Temp greater or equal to 38C (100.4F), Mild Pain (1 - 3)  bisacodyl 5 milliGRAM(s) Oral daily PRN Constipation  oxyCODONE    IR 2.5 milliGRAM(s) Oral every 4 hours PRN Moderate Pain (4 - 6)  oxyCODONE    IR 5 milliGRAM(s) Oral every 4 hours PRN Severe Pain (7 - 10)    LABS:              CAPILLARY BLOOD GLUCOSE              REVIEW OF SYSTEMS:  CONSTITUTIONAL: No fever, weight loss, or fatigue  EYES: No eye pain, visual disturbances, or discharge  ENMT:  No difficulty hearing, tinnitus, vertigo; No sinus or throat pain  NECK: No pain or stiffness  RESPIRATORY: No cough, wheezing, chills or hemoptysis; No shortness of breath  CARDIOVASCULAR: No chest pain, palpitations, dizziness, or leg swelling  GASTROINTESTINAL: No abdominal or epigastric pain. No nausea, vomiting, or hematemesis; No diarrhea or constipation. No melena or hematochezia.  GENITOURINARY: No dysuria, frequency, hematuria, or incontinence  NEUROLOGICAL: No headaches, memory loss, loss of strength, numbness, or tremors    Consultant(s) Notes Reviewed:  [x ] YES  [ ] NO    PHYSICAL EXAM:  GENERAL: NAD, well-groomed, well-developed,not in any distress ,  HEAD:  Atraumatic, Normocephalic  NECK: Supple, No JVD, Normal thyroid  NERVOUS SYSTEM:  Alert &  No focal deficit   CHEST/LUNG: Good air entry bilateral with no  rales, rhonchi, wheezing, or rubs  HEART: Regular rate and rhythm; No murmurs, rubs, or gallops  ABDOMEN: Soft, Nontender, Nondistended; Bowel sounds present  EXTREMITIES:  2+ Peripheral Pulses, No clubbing, cyanosis, or edema  Care Discussed with Consultants/Other Providers [ x] YES  [ ] NO
[facility] [place] [facility]   MD JASPREET RICHARD MD ANGELA WONG, PA    TEL:  OFFICE: [phone]  From 5pm-7am Answering Service [phone]    -- RENAL FOLLOW UP NOTE ---Date of Service 12-09-24 @ 14:39    Patient is a 84y old  Female who presents with a chief complaint of R hip pain w/ difficulty ambulating (09 Dec 2024 13:28)      Patient seen and examined at bedside. No chest pain/sob    VITALS:  T(F): 97.4 (12-09-24 @ 06:30), Max: 98.3 (12-08-24 @ 20:28)  HR: 70 (12-09-24 @ 06:30)  BP: 120/67 (12-09-24 @ 06:30)  RR: 18 (12-09-24 @ 06:30)  SpO2: 98% (12-09-24 @ 06:30)  Wt(kg): --        PHYSICAL EXAM:  General: NAD  Neck: No JVD  Respiratory: CTAB, no wheezes, rales or rhonchi  Cardiovascular: S1, S2, RRR  Gastrointestinal: BS+, soft, NT/ND  Extremities: No peripheral edema    Hospital Medications:   MEDICATIONS  (STANDING):  aspirin enteric coated 81 milliGRAM(s) Oral daily  buMETAnide 1 milliGRAM(s) Oral daily  dapagliflozin 10 milliGRAM(s) Oral daily  DULoxetine 30 milliGRAM(s) Oral daily  heparin   Injectable 5000 Unit(s) SubCutaneous every 8 hours  influenza  Vaccine (HIGH DOSE) 0.5 milliLiter(s) IntraMuscular once  levothyroxine 75 MICROGram(s) Oral daily  lidocaine   4% Patch 1 Patch Transdermal daily  memantine 10 milliGRAM(s) Oral daily  metoprolol tartrate 12.5 milliGRAM(s) Oral two times a day  multivitamin 1 Tablet(s) Oral daily  naloxone Injectable 0.4 milliGRAM(s) IV Push once  polyethylene glycol 3350 17 Gram(s) Oral daily  sacubitril 49 mG/valsartan 51 mG 1 Tablet(s) Oral two times a day  senna 2 Tablet(s) Oral at bedtime      LABS:  12-09    137  |  100  |  19  ----------------------------<  105[H]  4.0   |  23  |  1.08    Ca    9.2      09 Dec 2024 07:35    TPro  6.7  /  Alb  3.6  /  TBili  0.8  /  DBili      /  AST  25  /  ALT  38[H]  /  AlkPhos  77  12-08    Creatinine Trend: 1.08 <--, 1.05 <--, 1.31 <--    Albumin: 3.6 g/dL (12-08 @ 15:30)    calcium--  intact pth69  parathyroid hormone intact, serum--                            14.8   4.61  )-----------( 206      ( 09 Dec 2024 07:35 )             46.7     Urine Studies:  Urinalysis - [12-09-24 @ 07:35]      Color  / Appearance  / SG  / pH       Gluc 105 / Ketone   / Bili  / Urobili        Blood  / Protein  / Leuk Est  / Nitrite       RBC  / WBC  / Hyaline  / Gran  / Sq Epi  / Non Sq Epi  / Bacteria       PTH -- (Ca --)      [12-09-24 @ 07:35]   69  Vitamin D (25OH) 34.1      [02-20-24 @ 06:20]  HbA1c 6.1      [01-02-20 @ 12:54]  TSH 0.97      [12-08-24 @ 15:30]  Lipid: chol 93, TG 60, HDL 36, LDL --      [02-20-24 @ 06:20]        RADIOLOGY & ADDITIONAL STUDIES:  
[facility] [place] [facility]   MD JASPREET RICHARD MD ANGELA WONG, PA    TEL:  OFFICE: [phone]  From 5pm-7am Answering Service [phone]    -- RENAL FOLLOW UP NOTE ---Date of Service 12-10-24 @ 14:18    Patient is a 84y old  Female who presents with a chief complaint of R hip pain w/ difficulty ambulating (10 Dec 2024 09:25)      Patient seen and examined at bedside. No chest pain/sob    VITALS:  T(F): 97.9 (12-10-24 @ 11:57), Max: 98.5 (12-09-24 @ 20:30)  HR: 80 (12-10-24 @ 11:57)  BP: 116/82 (12-10-24 @ 11:57)  RR: 18 (12-10-24 @ 11:57)  SpO2: 100% (12-10-24 @ 11:57)  Wt(kg): --    12-10 @ 07:01  -  12-10 @ 14:18  --------------------------------------------------------  IN: 250 mL / OUT: 0 mL / NET: 250 mL          PHYSICAL EXAM:  General: NAD  Neck: No JVD  Respiratory: CTAB, no wheezes, rales or rhonchi  Cardiovascular: S1, S2, RRR  Gastrointestinal: BS+, soft, NT/ND  Extremities: No peripheral edema    Hospital Medications:   MEDICATIONS  (STANDING):  acetaminophen     Tablet .. 975 milliGRAM(s) Oral every 8 hours  aspirin enteric coated 81 milliGRAM(s) Oral daily  buMETAnide 1 milliGRAM(s) Oral daily  dapagliflozin 10 milliGRAM(s) Oral daily  DULoxetine 30 milliGRAM(s) Oral daily  heparin   Injectable 5000 Unit(s) SubCutaneous every 8 hours  influenza  Vaccine (HIGH DOSE) 0.5 milliLiter(s) IntraMuscular once  levothyroxine 75 MICROGram(s) Oral daily  lidocaine   4% Patch 1 Patch Transdermal daily  memantine 10 milliGRAM(s) Oral daily  metoprolol tartrate 12.5 milliGRAM(s) Oral two times a day  multivitamin 1 Tablet(s) Oral daily  naloxone Injectable 0.4 milliGRAM(s) IV Push once  polyethylene glycol 3350 17 Gram(s) Oral daily  sacubitril 49 mG/valsartan 51 mG 1 Tablet(s) Oral two times a day  senna 2 Tablet(s) Oral at bedtime      LABS:  12-09    137  |  100  |  19  ----------------------------<  105[H]  4.0   |  23  |  1.08    Ca    9.2      09 Dec 2024 07:35    TPro  6.7  /  Alb  3.6  /  TBili  0.8  /  DBili      /  AST  25  /  ALT  38[H]  /  AlkPhos  77  12-08    Creatinine Trend: 1.08 <--, 1.05 <--, 1.31 <--                                14.8   4.61  )-----------( 206      ( 09 Dec 2024 07:35 )             46.7     Urine Studies:  Urinalysis - [12-09-24 @ 07:35]      Color  / Appearance  / SG  / pH       Gluc 105 / Ketone   / Bili  / Urobili        Blood  / Protein  / Leuk Est  / Nitrite       RBC  / WBC  / Hyaline  / Gran  / Sq Epi  / Non Sq Epi  / Bacteria       PTH -- (Ca --)      [12-09-24 @ 07:35]   69  Vitamin D (25OH) 34.1      [02-20-24 @ 06:20]  HbA1c 6.1      [01-02-20 @ 12:54]  TSH 0.97      [12-08-24 @ 15:30]  Lipid: chol 93, TG 60, HDL 36, LDL --      [02-20-24 @ 06:20]        RADIOLOGY & ADDITIONAL STUDIES:  
[facility] [place] [facility]   MD JASPREET RICHARD MD ANGELA WONG, PA    TEL:  OFFICE: [phone]  From 5pm-7am Answering Service [phone]    -- RENAL FOLLOW UP NOTE ---Date of Service 12-12-24 @ 11:57    Patient is a 84y old  Female who presents with a chief complaint of R hip pain w/ difficulty ambulating (12 Dec 2024 10:05)      Patient seen and examined at bedside. No chest pain/sob    VITALS:  T(F): 97.7 (12-12-24 @ 11:39), Max: 98.4 (12-12-24 @ 04:00)  HR: 71 (12-12-24 @ 11:39)  BP: 124/74 (12-12-24 @ 11:39)  RR: 18 (12-12-24 @ 11:39)  SpO2: 98% (12-12-24 @ 11:39)  Wt(kg): --        PHYSICAL EXAM:  General: NAD  Neck: No JVD  Respiratory: CTAB, no wheezes, rales or rhonchi  Cardiovascular: S1, S2, RRR  Gastrointestinal: BS+, soft, NT/ND  Extremities: No peripheral edema    Hospital Medications:   MEDICATIONS  (STANDING):  aspirin enteric coated 81 milliGRAM(s) Oral daily  buMETAnide 1 milliGRAM(s) Oral daily  dapagliflozin 10 milliGRAM(s) Oral daily  DULoxetine 30 milliGRAM(s) Oral daily  heparin   Injectable 5000 Unit(s) SubCutaneous every 8 hours  influenza  Vaccine (HIGH DOSE) 0.5 milliLiter(s) IntraMuscular once  levothyroxine 75 MICROGram(s) Oral daily  lidocaine   4% Patch 1 Patch Transdermal daily  memantine 10 milliGRAM(s) Oral daily  metoprolol tartrate 12.5 milliGRAM(s) Oral two times a day  multivitamin 1 Tablet(s) Oral daily  naloxone Injectable 0.4 milliGRAM(s) IV Push once  polyethylene glycol 3350 17 Gram(s) Oral daily  sacubitril 49 mG/valsartan 51 mG 1 Tablet(s) Oral two times a day  senna 2 Tablet(s) Oral at bedtime      LABS:        Creatinine Trend: 1.08 <--, 1.05 <--, 1.31 <--            Urine Studies:  Urinalysis - [12-09-24 @ 07:35]      Color  / Appearance  / SG  / pH       Gluc 105 / Ketone   / Bili  / Urobili        Blood  / Protein  / Leuk Est  / Nitrite       RBC  / WBC  / Hyaline  / Gran  / Sq Epi  / Non Sq Epi  / Bacteria       PTH -- (Ca --)      [12-09-24 @ 07:35]   69  Vitamin D (25OH) 34.1      [02-20-24 @ 06:20]  HbA1c 6.1      [01-02-20 @ 12:54]  TSH 0.97      [12-08-24 @ 15:30]  Lipid: chol 93, TG 60, HDL 36, LDL --      [02-20-24 @ 06:20]        RADIOLOGY & ADDITIONAL STUDIES:  
[facility] [place] [facility]   MD JASPREET RICHARD MD ANGELA WONG, PA    TEL:  OFFICE: [phone]  From 5pm-7am Answering Service [phone]    -- RENAL FOLLOW UP NOTE ---Date of Service 12-11-24 @ 13:48    Patient is a 84y old  Female who presents with a chief complaint of R hip pain w/ difficulty ambulating (11 Dec 2024 08:16)      Patient seen and examined at bedside. No chest pain/sob, c/o persistent hip pain    VITALS:  T(F): 97.5 (12-11-24 @ 12:00), Max: 98.2 (12-11-24 @ 05:50)  HR: 72 (12-11-24 @ 12:00)  BP: 136/75 (12-11-24 @ 12:00)  RR: 18 (12-11-24 @ 12:00)  SpO2: 97% (12-11-24 @ 12:00)  Wt(kg): --    12-10 @ 07:01  -  12-11 @ 07:00  --------------------------------------------------------  IN: 750 mL / OUT: 0 mL / NET: 750 mL          PHYSICAL EXAM:  General: NAD  Neck: No JVD  Respiratory: CTAB, no wheezes, rales or rhonchi  Cardiovascular: S1, S2, RRR  Gastrointestinal: BS+, soft, NT/ND  Extremities: No peripheral edema    Hospital Medications:   MEDICATIONS  (STANDING):  aspirin enteric coated 81 milliGRAM(s) Oral daily  buMETAnide 1 milliGRAM(s) Oral daily  dapagliflozin 10 milliGRAM(s) Oral daily  DULoxetine 30 milliGRAM(s) Oral daily  heparin   Injectable 5000 Unit(s) SubCutaneous every 8 hours  influenza  Vaccine (HIGH DOSE) 0.5 milliLiter(s) IntraMuscular once  levothyroxine 75 MICROGram(s) Oral daily  lidocaine   4% Patch 1 Patch Transdermal daily  memantine 10 milliGRAM(s) Oral daily  metoprolol tartrate 12.5 milliGRAM(s) Oral two times a day  multivitamin 1 Tablet(s) Oral daily  naloxone Injectable 0.4 milliGRAM(s) IV Push once  polyethylene glycol 3350 17 Gram(s) Oral daily  sacubitril 49 mG/valsartan 51 mG 1 Tablet(s) Oral two times a day  senna 2 Tablet(s) Oral at bedtime      LABS:        Creatinine Trend: 1.08 <--, 1.05 <--, 1.31 <--            Urine Studies:  Urinalysis - [12-09-24 @ 07:35]      Color  / Appearance  / SG  / pH       Gluc 105 / Ketone   / Bili  / Urobili        Blood  / Protein  / Leuk Est  / Nitrite       RBC  / WBC  / Hyaline  / Gran  / Sq Epi  / Non Sq Epi  / Bacteria       PTH -- (Ca --)      [12-09-24 @ 07:35]   69  Vitamin D (25OH) 34.1      [02-20-24 @ 06:20]  HbA1c 6.1      [01-02-20 @ 12:54]  TSH 0.97      [12-08-24 @ 15:30]  Lipid: chol 93, TG 60, HDL 36, LDL --      [02-20-24 @ 06:20]        RADIOLOGY & ADDITIONAL STUDIES:  
Cardiovascular Disease Progress Note  DATE OF SERVICE: 12-10-24 @ 09:25    Overnight events: No acute events overnight.    The patient denies chest pain or SOB.   Otherwise review of systems negative    Objective Findings:  T(C): 36.7 (12-10-24 @ 04:30), Max: 36.9 (12-09-24 @ 20:30)  HR: 72 (12-10-24 @ 04:30) (72 - 90)  BP: 131/85 (12-10-24 @ 04:30) (117/80 - 131/85)  RR: 18 (12-10-24 @ 04:30) (17 - 18)  SpO2: 99% (12-10-24 @ 04:30) (98% - 100%)  Wt(kg): --  Daily     Daily       Physical Exam:  Gen: NAD; Patient resting comfortably  HEENT: EOMI, Normocephalic/ atraumatic  CV: RRR, normal S1 + S2, no m/r/g  Lungs:  Normal respiratory effort; clear to auscultation bilaterally  Abd: soft, non-tender; bowel sounds present  Ext: No edema; warm and well perfused    Telemetry: Sinus    Laboratory Data:                        14.8   4.61  )-----------( 206      ( 09 Dec 2024 07:35 )             46.7     12-09    137  |  100  |  19  ----------------------------<  105[H]  4.0   |  23  |  1.08    Ca    9.2      09 Dec 2024 07:35    TPro  6.7  /  Alb  3.6  /  TBili  0.8  /  DBili  x   /  AST  25  /  ALT  38[H]  /  AlkPhos  77  12-08              Inpatient Medications:  MEDICATIONS  (STANDING):  aspirin enteric coated 81 milliGRAM(s) Oral daily  buMETAnide 1 milliGRAM(s) Oral daily  dapagliflozin 10 milliGRAM(s) Oral daily  DULoxetine 30 milliGRAM(s) Oral daily  heparin   Injectable 5000 Unit(s) SubCutaneous every 8 hours  influenza  Vaccine (HIGH DOSE) 0.5 milliLiter(s) IntraMuscular once  levothyroxine 75 MICROGram(s) Oral daily  lidocaine   4% Patch 1 Patch Transdermal daily  memantine 10 milliGRAM(s) Oral daily  metoprolol tartrate 12.5 milliGRAM(s) Oral two times a day  multivitamin 1 Tablet(s) Oral daily  naloxone Injectable 0.4 milliGRAM(s) IV Push once  polyethylene glycol 3350 17 Gram(s) Oral daily  sacubitril 49 mG/valsartan 51 mG 1 Tablet(s) Oral two times a day  senna 2 Tablet(s) Oral at bedtime      Assessment: 84 year old woman with HTN, Hypothyroidism, OA, Obesity and Type-2 DM presents with R hip pain.     Plan of Care:     #Compensated diastolic CHF-  Recent echo notable for advanced diastolic dysfunction with possible infiltrative cardiomyopathy.  Low voltage also noted on EKG.  Given that the patient is doing well with medical therapy, I would not pursue a cardiac amyloidosis work up.  Patient is not fluid overloaded.  Continue oral Bumex.       #HTN-  BP acceptable.    #ACP (advance care planning)-  CPT 76186  Advanced care planning was discussed with the patient.    Cardiac findings were discussed in detail and all questions were answered.      Over 55 minutes spent on total encounter; more than 50% of the visit was spent counseling and/or coordinating care by the attending physician.      Boby Browne MD [facility]  Cardiovascular Disease  [phone]
Cardiovascular Disease Progress Note  DATE OF SERVICE: 12-11-24 @ 08:16    Overnight events: No acute events overnight.    The patient denies chest pain or SOB.   Otherwise review of systems negative    Objective Findings:  T(C): 36.8 (12-11-24 @ 05:50), Max: 36.8 (12-11-24 @ 05:50)  HR: 72 (12-11-24 @ 05:50) (72 - 83)  BP: 123/80 (12-11-24 @ 05:50) (111/70 - 124/74)  RR: 18 (12-11-24 @ 05:50) (18 - 18)  SpO2: 99% (12-11-24 @ 05:50) (98% - 100%)  Wt(kg): --  Daily     Daily       Physical Exam:  Gen: NAD; Patient resting comfortably  HEENT: EOMI, Normocephalic/ atraumatic  CV: RRR, normal S1 + S2, no m/r/g  Lungs:  Normal respiratory effort; clear to auscultation bilaterally  Abd: soft, non-tender; bowel sounds present  Ext: No edema; warm and well perfused    Telemetry: n/a    Laboratory Data:    No recent labs      Inpatient Medications:  MEDICATIONS  (STANDING):  aspirin enteric coated 81 milliGRAM(s) Oral daily  buMETAnide 1 milliGRAM(s) Oral daily  dapagliflozin 10 milliGRAM(s) Oral daily  DULoxetine 30 milliGRAM(s) Oral daily  heparin   Injectable 5000 Unit(s) SubCutaneous every 8 hours  influenza  Vaccine (HIGH DOSE) 0.5 milliLiter(s) IntraMuscular once  levothyroxine 75 MICROGram(s) Oral daily  lidocaine   4% Patch 1 Patch Transdermal daily  memantine 10 milliGRAM(s) Oral daily  metoprolol tartrate 12.5 milliGRAM(s) Oral two times a day  multivitamin 1 Tablet(s) Oral daily  naloxone Injectable 0.4 milliGRAM(s) IV Push once  polyethylene glycol 3350 17 Gram(s) Oral daily  sacubitril 49 mG/valsartan 51 mG 1 Tablet(s) Oral two times a day  senna 2 Tablet(s) Oral at bedtime       Assessment: 84 year old woman with HTN, Hypothyroidism, OA, Obesity and Type-2 DM presents with R hip pain.     Plan of Care:     #Compensated diastolic CHF-  Recent echo notable for advanced diastolic dysfunction with possible infiltrative cardiomyopathy.  Low voltage also noted on EKG.  Given that the patient is doing well with medical therapy, I would not pursue a cardiac amyloidosis work up.  Patient is not fluid overloaded.  Continue oral Bumex.       #HTN-  BP acceptable.     #ACP (advance care planning)-  CPT 19297  Advanced care planning was discussed with the patient.    Cardiac findings were discussed in detail and all questions were answered.       Over 55 minutes spent on total encounter; more than 50% of the visit was spent counseling and/or coordinating care by the attending physician.      Boby Browne MD [facility]  Cardiovascular Disease  [phone]
Cardiovascular Disease Progress Note  DATE OF SERVICE: 12-13-24 @ 10:35    Overnight events: No acute events overnight.    The patient denies chest pain or SOB.   Otherwise review of systems negative    Objective Findings:  T(C): 36.3 (12-13-24 @ 05:45), Max: 36.7 (12-12-24 @ 16:44)  HR: 74 (12-13-24 @ 05:45) (71 - 80)  BP: 124/76 (12-13-24 @ 05:45) (114/71 - 125/74)  RR: 18 (12-13-24 @ 05:45) (18 - 18)  SpO2: 98% (12-13-24 @ 05:45) (97% - 98%)  Wt(kg): --  Daily     Daily       Physical Exam:  Gen: NAD; Patient resting comfortably  HEENT: EOMI, Normocephalic/ atraumatic  CV: RRR, normal S1 + S2, no m/r/g  Lungs:  Normal respiratory effort; clear to auscultation bilaterally  Abd: soft, non-tender; bowel sounds present  Ext: No edema; warm and well perfused    Telemetry: n/a    Laboratory Data:    No recent labs      Inpatient Medications:  MEDICATIONS  (STANDING):  aspirin enteric coated 81 milliGRAM(s) Oral daily  buMETAnide 1 milliGRAM(s) Oral daily  dapagliflozin 10 milliGRAM(s) Oral daily  DULoxetine 30 milliGRAM(s) Oral daily  heparin   Injectable 5000 Unit(s) SubCutaneous every 8 hours  influenza  Vaccine (HIGH DOSE) 0.5 milliLiter(s) IntraMuscular once  levothyroxine 75 MICROGram(s) Oral daily  lidocaine   4% Patch 1 Patch Transdermal daily  memantine 10 milliGRAM(s) Oral daily  metoprolol tartrate 12.5 milliGRAM(s) Oral two times a day  multivitamin 1 Tablet(s) Oral daily  naloxone Injectable 0.4 milliGRAM(s) IV Push once  polyethylene glycol 3350 17 Gram(s) Oral daily  sacubitril 49 mG/valsartan 51 mG 1 Tablet(s) Oral two times a day  senna 2 Tablet(s) Oral at bedtime      Assessment: 84 year old woman with HTN, Hypothyroidism, OA, Obesity and Type-2 DM presents with R hip pain.     Plan of Care:     #Compensated diastolic CHF-  Recent echo notable for advanced diastolic dysfunction with possible infiltrative cardiomyopathy.  Low voltage also noted on EKG.  Given that the patient is doing well with medical therapy, I would not pursue a cardiac amyloidosis work up.  Patient is not fluid overloaded.  Continue oral Bumex.       #HTN-  BP acceptable.               Over 55 minutes spent on total encounter; more than 50% of the visit was spent counseling and/or coordinating care by the attending physician.      Boby Browne MD [facility]  Cardiovascular Disease  [phone]
Date of Service  : 12-10-24     INTERVAL HPI/OVERNIGHT EVENTS: I feel fine.   Vital Signs Last 24 Hrs  T(C): 36.6 (10 Dec 2024 11:57), Max: 36.9 (09 Dec 2024 20:30)  T(F): 97.9 (10 Dec 2024 11:57), Max: 98.5 (09 Dec 2024 20:30)  HR: 80 (10 Dec 2024 11:57) (72 - 90)  BP: 116/82 (10 Dec 2024 11:57) (116/82 - 131/85)  BP(mean): --  RR: 18 (10 Dec 2024 11:57) (17 - 18)  SpO2: 100% (10 Dec 2024 11:57) (99% - 100%)    Parameters below as of 10 Dec 2024 04:30  Patient On (Oxygen Delivery Method): room air      I&O's Summary    10 Dec 2024 07:01  -  10 Dec 2024 16:33  --------------------------------------------------------  IN: 250 mL / OUT: 0 mL / NET: 250 mL      MEDICATIONS  (STANDING):  acetaminophen     Tablet .. 975 milliGRAM(s) Oral every 8 hours  aspirin enteric coated 81 milliGRAM(s) Oral daily  buMETAnide 1 milliGRAM(s) Oral daily  dapagliflozin 10 milliGRAM(s) Oral daily  DULoxetine 30 milliGRAM(s) Oral daily  heparin   Injectable 5000 Unit(s) SubCutaneous every 8 hours  influenza  Vaccine (HIGH DOSE) 0.5 milliLiter(s) IntraMuscular once  levothyroxine 75 MICROGram(s) Oral daily  lidocaine   4% Patch 1 Patch Transdermal daily  memantine 10 milliGRAM(s) Oral daily  metoprolol tartrate 12.5 milliGRAM(s) Oral two times a day  multivitamin 1 Tablet(s) Oral daily  naloxone Injectable 0.4 milliGRAM(s) IV Push once  polyethylene glycol 3350 17 Gram(s) Oral daily  sacubitril 49 mG/valsartan 51 mG 1 Tablet(s) Oral two times a day  senna 2 Tablet(s) Oral at bedtime    MEDICATIONS  (PRN):  bisacodyl 5 milliGRAM(s) Oral daily PRN Constipation  oxyCODONE    IR 2.5 milliGRAM(s) Oral every 4 hours PRN Moderate Pain (4 - 6)  oxyCODONE    IR 5 milliGRAM(s) Oral every 4 hours PRN Severe Pain (7 - 10)    LABS:                        14.8   4.61  )-----------( 206      ( 09 Dec 2024 07:35 )             46.7     12-09    137  |  100  |  19  ----------------------------<  105[H]  4.0   |  23  |  1.08    Ca    9.2      09 Dec 2024 07:35        Urinalysis Basic - ( 09 Dec 2024 07:35 )    Color: x / Appearance: x / SG: x / pH: x  Gluc: 105 mg/dL / Ketone: x  / Bili: x / Urobili: x   Blood: x / Protein: x / Nitrite: x   Leuk Esterase: x / RBC: x / WBC x   Sq Epi: x / Non Sq Epi: x / Bacteria: x      CAPILLARY BLOOD GLUCOSE            Urinalysis Basic - ( 09 Dec 2024 07:35 )    Color: x / Appearance: x / SG: x / pH: x  Gluc: 105 mg/dL / Ketone: x  / Bili: x / Urobili: x   Blood: x / Protein: x / Nitrite: x   Leuk Esterase: x / RBC: x / WBC x   Sq Epi: x / Non Sq Epi: x / Bacteria: x      REVIEW OF SYSTEMS:  CONSTITUTIONAL: No fever, weight loss, or fatigue  EYES: No eye pain, visual disturbances, or discharge  ENMT:  No difficulty hearing, tinnitus, vertigo; No sinus or throat pain  NECK: No pain or stiffness  RESPIRATORY: No cough, wheezing, chills or hemoptysis; No shortness of breath  CARDIOVASCULAR: No chest pain, palpitations, dizziness, or leg swelling  GASTROINTESTINAL: No abdominal or epigastric pain. No nausea, vomiting, or hematemesis; No diarrhea or constipation. No melena or hematochezia.  GENITOURINARY: No dysuria, frequency, hematuria, or incontinence  NEUROLOGICAL: No headaches, memory loss, loss of strength, numbness, or tremors      Consultant(s) Notes Reviewed:  [x ] YES  [ ] NO    PHYSICAL EXAM:  GENERAL: NAD, well-groomed, well-developed,not in any distress ,  HEAD:  Atraumatic, Normocephalic  NECK: Supple, No JVD, Normal thyroid  NERVOUS SYSTEM:  Alert &, No focal deficit   CHEST/LUNG: Good air entry bilateral with no  rales, rhonchi, wheezing, or rubs  HEART: Regular rate and rhythm; No murmurs, rubs, or gallops  ABDOMEN: Soft, Nontender, Nondistended; Bowel sounds present  EXTREMITIES:  2+ Peripheral Pulses, No clubbing, cyanosis, or edema      Care Discussed with Consultants/Other Providers [ x] YES  [ ] NO
[facility]   MD JASPREET RICHARD MD ANGELA WONG, PA QIAN CHEN, PRETTY    TEL:  OFFICE: [phone]  From 5pm-7am Answering Service [phone]    -- RENAL FOLLOW UP NOTE ---Date of Service 12-08-24 @ 17:24    Patient is a 84y old  Female who presents with a chief complaint of R hip pain w/ difficulty ambulating.    Patient seen and examined at bedside. No chest pain/SOB.    VITALS:  T(F): 98 (12-08-24 @ 11:34), Max: 98 (12-07-24 @ 19:16)  HR: 82 (12-08-24 @ 11:34)  BP: 135/84 (12-08-24 @ 11:34)  RR: 16 (12-08-24 @ 11:34)  SpO2: 98% (12-08-24 @ 11:34)  Wt(kg): --      PHYSICAL EXAM:  General: NAD  Neck: No JVD  Respiratory: CTAB, no wheezes, rales or rhonchi  Cardiovascular: S1, S2, RRR  Gastrointestinal: BS+, soft, NT/ND  Extremities: No peripheral edema    Hospital Medications:   MEDICATIONS  (STANDING):  aspirin enteric coated 81 milliGRAM(s) Oral daily  buMETAnide 1 milliGRAM(s) Oral daily  dapagliflozin 10 milliGRAM(s) Oral daily  DULoxetine 30 milliGRAM(s) Oral daily  heparin   Injectable 5000 Unit(s) SubCutaneous every 8 hours  influenza  Vaccine (HIGH DOSE) 0.5 milliLiter(s) IntraMuscular once  levothyroxine 75 MICROGram(s) Oral daily  lidocaine   4% Patch 1 Patch Transdermal daily  memantine 10 milliGRAM(s) Oral daily  metoprolol tartrate 12.5 milliGRAM(s) Oral two times a day  multivitamin 1 Tablet(s) Oral daily  naloxone Injectable 0.4 milliGRAM(s) IV Push once  polyethylene glycol 3350 17 Gram(s) Oral daily  sacubitril 49 mG/valsartan 51 mG 1 Tablet(s) Oral two times a day  senna 2 Tablet(s) Oral at bedtime      LABS:  12-08    138  |  102  |  19  ----------------------------<  93  4.1   |  26  |  1.05    Ca    8.9      08 Dec 2024 15:30    TPro  6.7  /  Alb  3.6  /  TBili  0.8  /  DBili      /  AST  25  /  ALT  38[H]  /  AlkPhos  77  12-08    Creatinine Trend: 1.05 <--, 1.31 <--    Albumin: 3.6 g/dL (12-08 @ 15:30)  Albumin: 4.1 g/dL (12-07 @ 19:48)                           15.0   6.80  )-----------( 225      ( 07 Dec 2024 22:34 )             48.1     Urine Studies:  Urinalysis - [12-08-24 @ 15:30]      Color  / Appearance  / SG  / pH       Gluc 93 / Ketone   / Bili  / Urobili        Blood  / Protein  / Leuk Est  / Nitrite       RBC  / WBC  / Hyaline  / Gran  / Sq Epi  / Non Sq Epi  / Bacteria       Vitamin D (25OH) 34.1      [02-20-24 @ 06:20]  HbA1c 6.1      [01-02-20 @ 12:54]  TSH 0.97      [12-08-24 @ 15:30]  Lipid: chol 93, TG 60, HDL 36, LDL --      [02-20-24 @ 06:20]        
Date of Service  : 12-11-24     INTERVAL HPI/OVERNIGHT EVENTS: I feel better.  Vital Signs Last 24 Hrs  T(C): 36.4 (11 Dec 2024 12:00), Max: 36.8 (11 Dec 2024 05:50)  T(F): 97.5 (11 Dec 2024 12:00), Max: 98.2 (11 Dec 2024 05:50)  HR: 72 (11 Dec 2024 12:00) (72 - 81)  BP: 136/75 (11 Dec 2024 12:00) (111/70 - 136/75)  BP(mean): --  RR: 18 (11 Dec 2024 12:00) (18 - 18)  SpO2: 97% (11 Dec 2024 12:00) (97% - 99%)    Parameters below as of 11 Dec 2024 12:00  Patient On (Oxygen Delivery Method): room air      I&O's Summary    10 Dec 2024 07:01  -  11 Dec 2024 07:00  --------------------------------------------------------  IN: 750 mL / OUT: 0 mL / NET: 750 mL      MEDICATIONS  (STANDING):  aspirin enteric coated 81 milliGRAM(s) Oral daily  buMETAnide 1 milliGRAM(s) Oral daily  dapagliflozin 10 milliGRAM(s) Oral daily  DULoxetine 30 milliGRAM(s) Oral daily  heparin   Injectable 5000 Unit(s) SubCutaneous every 8 hours  influenza  Vaccine (HIGH DOSE) 0.5 milliLiter(s) IntraMuscular once  levothyroxine 75 MICROGram(s) Oral daily  lidocaine   4% Patch 1 Patch Transdermal daily  memantine 10 milliGRAM(s) Oral daily  metoprolol tartrate 12.5 milliGRAM(s) Oral two times a day  multivitamin 1 Tablet(s) Oral daily  naloxone Injectable 0.4 milliGRAM(s) IV Push once  polyethylene glycol 3350 17 Gram(s) Oral daily  sacubitril 49 mG/valsartan 51 mG 1 Tablet(s) Oral two times a day  senna 2 Tablet(s) Oral at bedtime    MEDICATIONS  (PRN):  acetaminophen     Tablet .. 650 milliGRAM(s) Oral every 6 hours PRN Temp greater or equal to 38C (100.4F), Mild Pain (1 - 3)  bisacodyl 5 milliGRAM(s) Oral daily PRN Constipation  oxyCODONE    IR 2.5 milliGRAM(s) Oral every 4 hours PRN Moderate Pain (4 - 6)  oxyCODONE    IR 5 milliGRAM(s) Oral every 4 hours PRN Severe Pain (7 - 10)    LABS:              CAPILLARY BLOOD GLUCOSE              Consultant(s) Notes Reviewed:  [x ] YES  [ ] NO    PHYSICAL EXAM:  GENERAL: NAD, well-groomed, well-developed,not in any distress ,  HEAD:  Atraumatic, Normocephalic  NECK: Supple, No JVD, Normal thyroid  NERVOUS SYSTEM:  Alert &  No focal deficit   CHEST/LUNG: Good air entry bilateral with no  rales, rhonchi, wheezing, or rubs  HEART: Regular rate and rhythm; No murmurs, rubs, or gallops  ABDOMEN: Soft, Nontender, Nondistended; Bowel sounds present  EXTREMITIES:  2+ Peripheral Pulses, No clubbing, cyanosis, or edema    Care Discussed with Consultants/Other Providers [ x] YES  [ ] NO
[facility] [place] [facility]   MD JASPREET RICHARD MD ANGELA WONG, PA    TEL:  OFFICE: [phone]  From 5pm-7am Answering Service [phone]    -- RENAL FOLLOW UP NOTE ---Date of Service 12-13-24 @ 10:16    Patient is a 84y old  Female who presents with a chief complaint of R hip pain w/ difficulty ambulating (12 Dec 2024 11:56)      Patient seen and examined at bedside. No chest pain/sob. +persistent hip pain    VITALS:  T(F): 97.3 (12-13-24 @ 05:45), Max: 98 (12-12-24 @ 16:44)  HR: 74 (12-13-24 @ 05:45)  BP: 124/76 (12-13-24 @ 05:45)  RR: 18 (12-13-24 @ 05:45)  SpO2: 98% (12-13-24 @ 05:45)  Wt(kg): --        PHYSICAL EXAM:  General: NAD  Neck: No JVD  Respiratory: CTAB, no wheezes, rales or rhonchi  Cardiovascular: S1, S2, RRR  Gastrointestinal: BS+, soft, NT/ND  Extremities: No peripheral edema    Hospital Medications:   MEDICATIONS  (STANDING):  aspirin enteric coated 81 milliGRAM(s) Oral daily  buMETAnide 1 milliGRAM(s) Oral daily  dapagliflozin 10 milliGRAM(s) Oral daily  DULoxetine 30 milliGRAM(s) Oral daily  heparin   Injectable 5000 Unit(s) SubCutaneous every 8 hours  influenza  Vaccine (HIGH DOSE) 0.5 milliLiter(s) IntraMuscular once  levothyroxine 75 MICROGram(s) Oral daily  lidocaine   4% Patch 1 Patch Transdermal daily  memantine 10 milliGRAM(s) Oral daily  metoprolol tartrate 12.5 milliGRAM(s) Oral two times a day  multivitamin 1 Tablet(s) Oral daily  naloxone Injectable 0.4 milliGRAM(s) IV Push once  polyethylene glycol 3350 17 Gram(s) Oral daily  sacubitril 49 mG/valsartan 51 mG 1 Tablet(s) Oral two times a day  senna 2 Tablet(s) Oral at bedtime      LABS:        Creatinine Trend: 1.08 <--, 1.05 <--, 1.31 <--            Urine Studies:  Urinalysis - [12-09-24 @ 07:35]      Color  / Appearance  / SG  / pH       Gluc 105 / Ketone   / Bili  / Urobili        Blood  / Protein  / Leuk Est  / Nitrite       RBC  / WBC  / Hyaline  / Gran  / Sq Epi  / Non Sq Epi  / Bacteria       PTH -- (Ca --)      [12-09-24 @ 07:35]   69  Vitamin D (25OH) 34.1      [02-20-24 @ 06:20]  HbA1c 6.1      [01-02-20 @ 12:54]  TSH 0.97      [12-08-24 @ 15:30]  Lipid: chol 93, TG 60, HDL 36, LDL --      [02-20-24 @ 06:20]        RADIOLOGY & ADDITIONAL STUDIES:  
Date of Service  : 12-08-24     INTERVAL HPI/OVERNIGHT EVENTS: I feel fine.   Vital Signs Last 24 Hrs  T(C): 36.7 (08 Dec 2024 11:34), Max: 36.7 (07 Dec 2024 19:16)  T(F): 98 (08 Dec 2024 11:34), Max: 98 (07 Dec 2024 19:16)  HR: 82 (08 Dec 2024 11:34) (71 - 82)  BP: 135/84 (08 Dec 2024 11:34) (117/76 - 145/75)  BP(mean): --  RR: 16 (08 Dec 2024 11:34) (15 - 18)  SpO2: 98% (08 Dec 2024 11:34) (98% - 100%)    Parameters below as of 08 Dec 2024 11:34  Patient On (Oxygen Delivery Method): room air      I&O's Summary    MEDICATIONS  (STANDING):  aspirin enteric coated 81 milliGRAM(s) Oral daily  buMETAnide 1 milliGRAM(s) Oral daily  dapagliflozin 10 milliGRAM(s) Oral daily  DULoxetine 30 milliGRAM(s) Oral daily  heparin   Injectable 5000 Unit(s) SubCutaneous every 8 hours  influenza  Vaccine (HIGH DOSE) 0.5 milliLiter(s) IntraMuscular once  levothyroxine 75 MICROGram(s) Oral daily  lidocaine   4% Patch 1 Patch Transdermal daily  memantine 10 milliGRAM(s) Oral daily  metoprolol tartrate 12.5 milliGRAM(s) Oral two times a day  multivitamin 1 Tablet(s) Oral daily  naloxone Injectable 0.4 milliGRAM(s) IV Push once  polyethylene glycol 3350 17 Gram(s) Oral daily  sacubitril 49 mG/valsartan 51 mG 1 Tablet(s) Oral two times a day  senna 2 Tablet(s) Oral at bedtime    MEDICATIONS  (PRN):  bisacodyl 5 milliGRAM(s) Oral daily PRN Constipation  oxyCODONE    IR 2.5 milliGRAM(s) Oral every 4 hours PRN Moderate Pain (4 - 6)  oxyCODONE    IR 5 milliGRAM(s) Oral every 4 hours PRN Severe Pain (7 - 10)    LABS:                        15.0   6.80  )-----------( 225      ( 07 Dec 2024 22:34 )             48.1     12-08    138  |  102  |  19  ----------------------------<  93  4.1   |  26  |  1.05    Ca    8.9      08 Dec 2024 15:30    TPro  6.7  /  Alb  3.6  /  TBili  0.8  /  DBili  x   /  AST  25  /  ALT  38[H]  /  AlkPhos  77  12-08      Urinalysis Basic - ( 08 Dec 2024 15:30 )    Color: x / Appearance: x / SG: x / pH: x  Gluc: 93 mg/dL / Ketone: x  / Bili: x / Urobili: x   Blood: x / Protein: x / Nitrite: x   Leuk Esterase: x / RBC: x / WBC x   Sq Epi: x / Non Sq Epi: x / Bacteria: x      CAPILLARY BLOOD GLUCOSE      POCT Blood Glucose.: 112 mg/dL (07 Dec 2024 23:36)        Urinalysis Basic - ( 08 Dec 2024 15:30 )    Color: x / Appearance: x / SG: x / pH: x  Gluc: 93 mg/dL / Ketone: x  / Bili: x / Urobili: x   Blood: x / Protein: x / Nitrite: x   Leuk Esterase: x / RBC: x / WBC x   Sq Epi: x / Non Sq Epi: x / Bacteria: x      REVIEW OF SYSTEMS:  CONSTITUTIONAL: No fever, weight loss, or fatigue  EYES: No eye pain, visual disturbances, or discharge  ENMT:  No difficulty hearing, tinnitus, vertigo; No sinus or throat pain  NECK: No pain or stiffness  RESPIRATORY: No cough, wheezing, chills or hemoptysis; No shortness of breath  CARDIOVASCULAR: No chest pain, palpitations, dizziness, or leg swelling  GASTROINTESTINAL: No abdominal or epigastric pain. No nausea, vomiting, or hematemesis; No diarrhea or constipation. No melena or hematochezia.  GENITOURINARY: No dysuria, frequency, hematuria, or incontinence  NEUROLOGICAL: No headaches, memory loss, loss of strength, numbness, or tremors    Consultant(s) Notes Reviewed:  [x ] YES  [ ] NO    PHYSICAL EXAM:  GENERAL: NAD, well-groomed, well-developed,not in any distress ,  HEAD:  Atraumatic, Normocephalic  EYES: EOMI, PERRLA, conjunctiva and sclera clear  ENMT: No tonsillar erythema, exudates, or enlargement; Moist mucous membranes, Good dentition, No lesions  NECK: Supple, No JVD, Normal thyroid  NERVOUS SYSTEM:  Alert &  No focal deficit   CHEST/LUNG: Good air entry bilateral with no  rales, rhonchi, wheezing, or rubs  HEART: Regular rate and rhythm; No murmurs, rubs, or gallops  ABDOMEN: Soft, Nontender, Nondistended; Bowel sounds present  EXTREMITIES:  2+ Peripheral Pulses, No clubbing, cyanosis, or edema    Care Discussed with Consultants/Other Providers [ x] YES  [ ] NO
Date of Service  : 12-12-24     INTERVAL HPI/OVERNIGHT EVENTS: I feel fine.   Vital Signs Last 24 Hrs  T(C): 36.7 (12 Dec 2024 16:44), Max: 36.9 (12 Dec 2024 04:00)  T(F): 98 (12 Dec 2024 16:44), Max: 98.4 (12 Dec 2024 04:00)  HR: 80 (12 Dec 2024 16:44) (70 - 80)  BP: 125/74 (12 Dec 2024 16:44) (117/71 - 129/75)  BP(mean): --  RR: 18 (12 Dec 2024 16:44) (17 - 18)  SpO2: 98% (12 Dec 2024 16:44) (96% - 98%)    Parameters below as of 12 Dec 2024 16:44  Patient On (Oxygen Delivery Method): room air      I&O's Summary    MEDICATIONS  (STANDING):  aspirin enteric coated 81 milliGRAM(s) Oral daily  buMETAnide 1 milliGRAM(s) Oral daily  dapagliflozin 10 milliGRAM(s) Oral daily  DULoxetine 30 milliGRAM(s) Oral daily  heparin   Injectable 5000 Unit(s) SubCutaneous every 8 hours  influenza  Vaccine (HIGH DOSE) 0.5 milliLiter(s) IntraMuscular once  levothyroxine 75 MICROGram(s) Oral daily  lidocaine   4% Patch 1 Patch Transdermal daily  memantine 10 milliGRAM(s) Oral daily  metoprolol tartrate 12.5 milliGRAM(s) Oral two times a day  multivitamin 1 Tablet(s) Oral daily  naloxone Injectable 0.4 milliGRAM(s) IV Push once  polyethylene glycol 3350 17 Gram(s) Oral daily  sacubitril 49 mG/valsartan 51 mG 1 Tablet(s) Oral two times a day  senna 2 Tablet(s) Oral at bedtime    MEDICATIONS  (PRN):  acetaminophen     Tablet .. 650 milliGRAM(s) Oral every 6 hours PRN Temp greater or equal to 38C (100.4F), Mild Pain (1 - 3)  bisacodyl 5 milliGRAM(s) Oral daily PRN Constipation  oxyCODONE    IR 2.5 milliGRAM(s) Oral every 4 hours PRN Moderate Pain (4 - 6)  oxyCODONE    IR 5 milliGRAM(s) Oral every 4 hours PRN Severe Pain (7 - 10)    LABS:              CAPILLARY BLOOD GLUCOSE              REVIEW OF SYSTEMS:  CONSTITUTIONAL: No fever, weight loss, or fatigue  EYES: No eye pain, visual disturbances, or discharge  ENMT:  No difficulty hearing, tinnitus, vertigo; No sinus or throat pain  NECK: No pain or stiffness  RESPIRATORY: No cough, wheezing, chills or hemoptysis; No shortness of breath  CARDIOVASCULAR: No chest pain, palpitations, dizziness, or leg swelling  GASTROINTESTINAL: No abdominal or epigastric pain. No nausea, vomiting, or hematemesis; No diarrhea or constipation. No melena or hematochezia.  GENITOURINARY: No dysuria, frequency, hematuria, or incontinence  NEUROLOGICAL: No headaches, memory loss, loss of strength, numbness, or tremors      Consultant(s) Notes Reviewed:  [x ] YES  [ ] NO    PHYSICAL EXAM:  GENERAL: NAD, well-groomed, well-developed,not in any distress ,  HEAD:  Atraumatic, Normocephalic  NECK: Supple, No JVD, Normal thyroid  NERVOUS SYSTEM:  Alert & Oriented X2, No focal deficit   CHEST/LUNG: Good air entry bilateral with no  rales, rhonchi, wheezing, or rubs  HEART: Regular rate and rhythm; No murmurs, rubs, or gallops  ABDOMEN: Soft, Nontender, Nondistended; Bowel sounds present  EXTREMITIES:  2+ Peripheral Pulses, No clubbing, cyanosis, or edema    Care Discussed with Consultants/Other Providers [ x] YES  [ ] NO

## 2024-12-13 NOTE — DISCHARGE NOTE NURSING/CASE MANAGEMENT/SOCIAL WORK - PATIENT PORTAL LINK FT
You can access the FollowMyHealth Patient Portal offered by [facility] by registering at the following website: [email or web address]. By joining [employer]’s FollowMyHealth portal, you will also be able to view your health information using other applications (apps) compatible with our system.

## 2024-12-25 NOTE — PHYSICAL THERAPY INITIAL EVALUATION ADULT - DIAGNOSIS, PT EVAL
Alert-The patient is alert, awake and responds to voice. The patient is oriented to time, place, and person. The triage nurse is able to obtain subjective information.
Decreased balance

## 2025-04-25 ENCOUNTER — APPOINTMENT (OUTPATIENT)
Dept: HOME HEALTH SERVICES | Facility: HOME HEALTH | Age: 85
End: 2025-04-25

## 2025-05-12 NOTE — PATIENT PROFILE ADULT. - SURGICAL SITE INCISION
Chief Complaint  Diabetes, Hypertension, and Hyperlipidemia    Subjective          Marilou Edwards presents to [facility]    History of Present Illness  Diabetes:  Current medication: metformin, amaryl  Tolerating medication: Yes  Last eye exam: UTD /sees Dr Oakley   Last foot exam:   At home BS ranges: 150 or less   Send refills to [facility]  Lab Results       Component                Value               Date                       HGBA1C                   7.30 (H)            2025              Hypertension:  Current medication: losartan, HCTZ, Toprol   Tolerating Medication: Yes  Checking BP at home and it is: yes, runs normal   Needs refills: Yes  Labs:  Lab Results       Component                Value               Date                       GLUCOSE                  152 (H)             2025                 BUN                      9                   2025                 CREATININE               0.68                2025                 EGFRIFNONA               98                  2021                 BCR                      13.2                2025                 K                        3.9                 2025                 CO2                      26.8                2025                 CALCIUM                  9.4                 2025                 ALBUMIN                  4.2                 2025                 AST                      17                  2025                 ALT                      15                  2025                Needs her Xarelto refilled, wonders if she needs to stay on for life.          PAST MEDICAL HISTORY changes since 2025:         Pulmonary Embolism: x 2;     Gestational Diabetes     Prothrombin gene mutation (MTHFR) one gene heterozygous/Liden 2     Colon cancer: dx'd in 2004;         GYNECOLOGICAL HISTORY:     miscarriage 1         Surgical  History:      Eye surgery 23  Right eye surgery 22    Hernia Repair: 2009; Ventral hernia repair;     Appendectomy: ;     Colon Resection: ; oophrectmy/salp;     colostomy reversl fall ;   Procedures: colonoscopy 13 normal     Dilation and Curettage: 17; hysterscopy;   Other Surgeries    LiF DIP  index mucous cyst excision  19;     COLONOSCOPY: was last done 8-3-22/ normal per pt ;  17 with normal results [place]         Family History:        Father:  at age 62; Cause of death was MI;  Alcoholism     Mother: lung cancer, Hypertension T2DM     Brother(s): 3 brother(s) total; 1 ;  Hypertension;  Drug Abuse ( heroin overdose )     Sister(s): Healthy; 1 sister(s) total;  Colonic Polyps;  Anxiety; Drug Abuse , provoked PE, has MTHFR +    Paternal Grandfather: Cause of death was MI   Son: + MTHFR  Daughters: 2     Paternal Grandmother: Cause of death was heart/obesity     Maternal Grandfather:  at age 60; Cause of death was MI     Maternal Grandmother:  at age 72; Cause of death was MI;  Type 2 Diabetes         Social History:       Occupation: [employer]     Marital Status:      Children: 3 children                Past Medical History:   Diagnosis Date    Acquired coagulation factor deficiency     Acute maxillary sinusitis, unspecified     Allergic     Anxiety disorder     Arthritis     Colon cancer     dx'd in     Colon polyp     Deep vein thrombosis     Essential (primary) hypertension     Gestational diabetes     Hyperlipidemia, unspecified     Long term (current) use of anticoagulants     Low back pain     Other fatigue     Other hereditary and idiopathic neuropathies     Other specified disorders of nose and nasal sinuses     Pain in left finger(s)     Personal history of other venous thrombosis and embolism     Prothrombin gene mutation     one gene heterozygous/liden2    Pulmonary embolism     x2    Type 2 diabetes  mellitus without complication     Unspecified superficial injury of right lower leg, initial encounter        Allergies   Allergen Reactions    Adhesive Tape Rash     adhesive    Copper Chloride Rash    Metal Rash        Past Surgical History:   Procedure Laterality Date    APPENDECTOMY  2005    COLON RESECTION  2005 and 2006    oophrectmy/salp    COLONOSCOPY  2013    normal    COLONOSCOPY  2017    normal reults    COLONOSCOPY N/A 2022    repeat in 5 years    COLOSTOMY  2006    reversl    DILATION AND CURETTAGE, DIAGNOSTIC / THERAPEUTIC  2017    hysterscopy    FINGER SURGERY Left 2019    cyst removed    HERNIA REPAIR  2009    ventral hernia repair        Social History     Tobacco Use    Smoking status: Former     Current packs/day: 0.00     Average packs/day: 1 pack/day for 17.0 years (17.0 ttl pk-yrs)     Types: Cigarettes     Start date:      Quit date:      Years since quittin.3     Passive exposure: Past    Smokeless tobacco: Never   Substance Use Topics    Alcohol use: Not Currently     Comment: rarely       Family History   Problem Relation Age of Onset    Hypertension Mother     Heart attack Father     Alcohol abuse Father     Hypertension Brother     Drug abuse Brother         heroin OD    Colonic polyp Sister     Anxiety disorder Sister     Drug abuse Sister     Heart attack Maternal Grandmother     Heart attack Maternal Grandfather     Heart defect Paternal Grandmother     Obesity Paternal Grandmother     Heart attack Paternal Grandfather         Health Maintenance Due   Topic Date Due    Pneumococcal Vaccine 50+ (1 of 2 - PCV) Never done    PAP SMEAR  Never done    ZOSTER VACCINE (1 of 2) Never done    URINE MICROALBUMIN-CREATININE RATIO (uACR)  2022    COVID-19 Vaccine (3 - 2024-25 season) 2024        Current Outpatient Medications on File Prior to Visit   Medication Sig    Blood Glucose Monitoring Suppl device Check blood sugar 3 times daily  with One Touch Ultra Meter    cholecalciferol (VITAMIN D3) 25 MCG (1000 UT) tablet Take 2 tablets by mouth Daily.    fluticasone (FLONASE) 50 MCG/ACT nasal spray Administer 2 sprays into the nostril(s) as directed by provider Daily. As directed by the provider    glucose blood (Accu-Chek Guide Test) test strip Check blood sugar daily.    loratadine (CLARITIN) 10 MG tablet Take 1 tablet by mouth Daily.    metFORMIN ER (GLUCOPHAGE-XR) 500 MG 24 hr tablet TAKE 1 TABLET BY MOUTH THREE TIMES A DAY (Patient taking differently: Take 2 tablets by mouth 2 (Two) Times a Day.)    valACYclovir (VALTREX) 1000 MG tablet TAKE 1 TABLET BY MOUTH 2 (TWO) TIMES A DAY AS NEEDED FOR COLD SORES.    [DISCONTINUED] glimepiride (Amaryl) 2 MG tablet Take 1 tablet by mouth Every Morning Before Breakfast.    [DISCONTINUED] hydroCHLOROthiazide 25 MG tablet TAKE 1 TABLET BY MOUTH EVERY DAY    [DISCONTINUED] losartan (COZAAR) 50 MG tablet TAKE 1 TABLET BY MOUTH EVERY DAY    [DISCONTINUED] metoprolol succinate XL (TOPROL-XL) 50 MG 24 hr tablet TAKE 1 TABLET BY MOUTH EVERY DAY    [DISCONTINUED] nystatin (MYCOSTATIN) 494029 UNIT/GM cream Apply  topically to the appropriate area as directed.    [DISCONTINUED] pantoprazole (PROTONIX) 40 MG EC tablet TAKE 1 TABLET BY MOUTH EVERY DAY    [DISCONTINUED] rosuvastatin (CRESTOR) 10 MG tablet TAKE 1 TABLET BY MOUTH EVERY DAY    [DISCONTINUED] solifenacin (VESICARE) 10 MG tablet Take 1 tablet by mouth Daily.    [DISCONTINUED] Xarelto 20 MG tablet TAKE 1 TABLET BY MOUTH EVERY DAY     No current facility-administered medications on file prior to visit.       Immunization History   Administered Date(s) Administered    COVID-19 (MODERNA) 1st,2nd,3rd Dose Monovalent 06/17/2021, 07/19/2021    Flu Vaccine Intradermal Quad 18-64YR 01/22/2013    Fluzone  >6mos 12/09/2013, 11/12/2024    Fluzone (or Fluarix & Flulaval for VFC) >6mos 12/08/2021, 01/04/2023, 01/03/2024    Hepatitis A 11/13/2018    Influenza Seasonal  "Injectable 01/22/2013    Influenza, Unspecified 10/23/2020    Td (TDVAX) 12/10/2020    Tdap 06/12/2012       Review of Systems   Constitutional:  Negative for fatigue and fever.   Respiratory:  Negative for cough and shortness of breath.    Cardiovascular:  Negative for chest pain, palpitations and leg swelling.   Gastrointestinal:  Negative for GERD (stopped her rx recently and wants to see how she does).   Genitourinary:  Positive for frequency (on rx, has improved, stopping rx).        Objective     Vitals:    05/13/25 0821   BP: 141/84   BP Location: Right arm   Patient Position: Sitting   Pulse: 80   Temp: 98.6 °F (37 °C)   TempSrc: Oral   SpO2: 97%  Comment: on RA   Weight: 96.7 kg (213 lb 3.2 oz)   Height: 165.1 cm (65\")            Physical Exam  Constitutional:       Appearance: Normal appearance.   Neck:      Vascular: No carotid bruit.   Cardiovascular:      Rate and Rhythm: Normal rate and regular rhythm.      Pulses:           Posterior tibial pulses are 2+ on the right side and 2+ on the left side.      Heart sounds: No murmur heard.  Pulmonary:      Effort: No respiratory distress.      Breath sounds: Normal breath sounds.   Musculoskeletal:         General: No swelling.   Feet:      Right foot:      Protective Sensation: 3 sites tested.  3 sites sensed.      Skin integrity: Skin integrity normal. No ulcer or blister.      Toenail Condition: Right toenails are normal.      Left foot:      Protective Sensation: 3 sites tested.  3 sites sensed.      Skin integrity: Skin integrity normal. No ulcer or blister.      Toenail Condition: Left toenails are normal.      Comments: Diabetic Foot Exam Performed and Monofilament Test Performed     Neurological:      Mental Status: She is alert.   Psychiatric:         Mood and Affect: Mood normal.         Thought Content: Thought content normal.         Result Review :     The following data was reviewed by: ELLI Savage on 05/13/2025:              "          Assessment and Plan      Diagnoses and all orders for this visit:    1. Other hyperlipidemia (Primary)  Assessment & Plan:   Reviewed last lab, adding on lab done yesterday, Continue current medication and efforts with diet and exercise.       Orders:  -     rosuvastatin (CRESTOR) 10 MG tablet; Take 1 tablet by mouth Daily.  Dispense: 90 tablet; Refill: 1  -     Lipid panel; Future    2. Type 2 diabetes mellitus without complication, without long-term current use of insulin  Assessment & Plan:  Checking her urine today, Reviewed her recent labs, to work on diet, regular exercise, monitor sugars, check feet daily, see optometrist yearly or as directed.      Orders:  -     glimepiride (Amaryl) 2 MG tablet; Take 1 tablet by mouth Every Morning Before Breakfast.  Dispense: 90 tablet; Refill: 1  -     Microalbumin / Creatinine Urine Ratio - Urine, Clean Catch; Future  -     Microalbumin / Creatinine Urine Ratio - Urine, Clean Catch    3. Essential hypertension  Assessment & Plan:  Hypertension is stable. Continue to monitor BP at home. Continue current meds. Continue to modify diet and lifestyle.     Orders:  -     losartan (COZAAR) 50 MG tablet; Take 1 tablet by mouth Daily.  Dispense: 90 tablet; Refill: 1  -     metoprolol succinate XL (TOPROL-XL) 50 MG 24 hr tablet; Take 1 tablet by mouth Daily.  Dispense: 90 tablet; Refill: 1  -     hydroCHLOROthiazide 25 MG tablet; Take 1 tablet by mouth Daily.  Dispense: 90 tablet; Refill: 1    4. History of pulmonary embolus (PE)  Assessment & Plan:  Refilled rx, would like to stop rx, sending to hematology     Orders:  -     rivaroxaban (Xarelto) 20 MG tablet; Take 1 tablet by mouth Daily.  Dispense: 90 tablet; Refill: 1  -     Ambulatory Referral to Hematology / Oncology    5. History of colon cancer  Assessment & Plan:  Sending her back to hem/onc, her previous hem/onc retired     Orders:  -     Ambulatory Referral to Hematology / Oncology                  Follow Up      Return for followup pending lab results.    Patient was given instructions and counseling regarding her condition or for health maintenance advice. Please see specific information pulled into the AVS if appropriate.        no

## 2025-07-18 NOTE — BRIEF OPERATIVE NOTE - ANTIBIOTIC PROTOCOL
Patient/Caregiver provided printed discharge information. Followed protocol quick resolution of symptoms, low nihhs score